# Patient Record
Sex: MALE | Race: WHITE | Employment: OTHER | URBAN - METROPOLITAN AREA
[De-identification: names, ages, dates, MRNs, and addresses within clinical notes are randomized per-mention and may not be internally consistent; named-entity substitution may affect disease eponyms.]

---

## 2017-01-18 ENCOUNTER — OFFICE VISIT (OUTPATIENT)
Dept: FAMILY MEDICINE | Facility: CLINIC | Age: 67
End: 2017-01-18
Payer: COMMERCIAL

## 2017-01-18 VITALS
TEMPERATURE: 97.9 F | DIASTOLIC BLOOD PRESSURE: 80 MMHG | OXYGEN SATURATION: 97 % | HEIGHT: 69 IN | RESPIRATION RATE: 12 BRPM | SYSTOLIC BLOOD PRESSURE: 120 MMHG | WEIGHT: 187 LBS | HEART RATE: 80 BPM | BODY MASS INDEX: 27.7 KG/M2

## 2017-01-18 DIAGNOSIS — Z01.818 PREOP GENERAL PHYSICAL EXAM: Primary | ICD-10-CM

## 2017-01-18 DIAGNOSIS — G47.00 INSOMNIA, UNSPECIFIED TYPE: ICD-10-CM

## 2017-01-18 DIAGNOSIS — L98.9 SKIN LESION: ICD-10-CM

## 2017-01-18 LAB — HGB BLD-MCNC: 14.6 G/DL (ref 13.3–17.7)

## 2017-01-18 PROCEDURE — 93000 ELECTROCARDIOGRAM COMPLETE: CPT | Performed by: PHYSICIAN ASSISTANT

## 2017-01-18 PROCEDURE — 99214 OFFICE O/P EST MOD 30 MIN: CPT | Performed by: PHYSICIAN ASSISTANT

## 2017-01-18 PROCEDURE — 36415 COLL VENOUS BLD VENIPUNCTURE: CPT | Performed by: PHYSICIAN ASSISTANT

## 2017-01-18 PROCEDURE — 85018 HEMOGLOBIN: CPT | Performed by: PHYSICIAN ASSISTANT

## 2017-01-18 RX ORDER — AMITRIPTYLINE HYDROCHLORIDE 10 MG/1
10 TABLET ORAL
Qty: 90 TABLET | Refills: 0 | Status: SHIPPED | OUTPATIENT
Start: 2017-01-18 | End: 2017-04-13

## 2017-01-18 NOTE — MR AVS SNAPSHOT
After Visit Summary   1/18/2017    Kumar Gonzalez    MRN: [id number]           Patient Information     Date Of Birth          1950        Visit Information        Provider Department      1/18/2017 4:40 PM Sabine Cherry PA-C [facility]        Today's Diagnoses     Preop general physical exam    -  1     Screen for colon cancer         Need for prophylactic vaccination and inoculation against influenza         Need for prophylactic vaccination against Streptococcus pneumoniae (pneumococcus)         Insomnia, unspecified type           Care Instructions    Do not start or take aspirin, ibuprofen, aleve or other NSAID prior to surgery  Schedule skin lesion removal with Dr. Torres, Dr. Alfaro or Dr. Maria   Schedule colonoscopy at [facility] ([phone]).        Before Your Surgery      Call your surgeon if there is any change in your health. This includes signs of a cold or flu (such as a sore throat, runny nose, cough, rash or fever).    Do not smoke, drink alcohol or take over the counter medicine (unless your surgeon or primary care doctor tells you to) for the 24 hours before and after surgery.    If you take prescribed drugs: Follow your doctor s orders about which medicines to take and which to stop until after surgery.    Eating and drinking prior to surgery: follow the instructions from your surgeon    Take a shower or bath the night before surgery. Use the soap your surgeon gave you to gently clean your skin. If you do not have soap from your surgeon, use your regular soap. Do not shave or scrub the surgery site.  Wear clean pajamas and have clean sheets on your bed.         Follow-ups after your visit        Who to contact     If you have questions or need follow up information about today's clinic visit or your schedule please contact [facility] directly at [phone].  Normal or non-critical lab and  "imaging results will be communicated to you by MyChart, letter or phone within 4 business days after the clinic has received the results. If you do not hear from us within 7 days, please contact the clinic through Exabret or phone. If you have a critical or abnormal lab result, we will notify you by phone as soon as possible.  Submit refill requests through Bocada or call your pharmacy and they will forward the refill request to us. Please allow 3 business days for your refill to be completed.          Additional Information About Your Visit        TouchOfModernhar[employer]tration Technology Information     Bocada lets you send messages to your doctor, view your test results, renew your prescriptions, schedule appointments and more. To sign up, go to [email or web address] . Click on \"Log in\" on the left side of the screen, which will take you to the Welcome page. Then click on \"Sign up Now\" on the right side of the page.     You will be asked to enter the access code listed below, as well as some personal information. Please follow the directions to create your username and password.     Your access code is: [email or web address]  Expires: 2017  5:11 PM     Your access code will  in 90 days. If you need help or a new code, please call your [place] clinic or [phone].        Care EveryWhere ID     This is your Care EveryWhere ID. This could be used by other organizations to access your [place] medical records  [id number]        Your Vitals Were     Pulse Temperature Respirations Height BMI (Body Mass Index) Pulse Oximetry    80 97.9  F (36.6  C) (Oral) 12 1.74 m (5' 8.5\") 28.02 kg/m2 97%       Blood Pressure from Last 3 Encounters:   17 120/80   12/15/16 118/70   10/07/16 108/70    Weight from Last 3 Encounters:   17 84.823 kg (187 lb)   12/15/16 78.472 kg (173 lb)   10/07/16 81.285 kg (179 lb 3.2 oz)              We Performed the Following     EKG 12-lead complete w/read - Clinics     Hemoglobin          Today's Medication " Changes          These changes are accurate as of: 1/18/17  5:11 PM.  If you have any questions, ask your nurse or doctor.               These medicines have changed or have updated prescriptions.        Dose/Directions    simvastatin 20 MG tablet   Commonly known as:  ZOCOR   This may have changed:    - when to take this  - additional instructions   Used for:  Hyperlipidemia LDL goal <130        Dose:  20 mg   Take 1 tablet (20 mg) by mouth At Bedtime Patient needs to be seen prior to further refills.   Quantity:  90 tablet   Refills:  3            Where to get your medicines      These medications were sent to [id number] - [place] - [address]  [address], [facility] [address]     Phone:  [phone]    - amitriptyline 10 MG tablet             Primary Care Provider Office Phone # Fax #    Suzanne Torres [phone]       [facility] [address] [place] [address] N  [facility] [address]        Thank you!     Thank you for choosing [facility]  for your care. Our goal is always to provide you with excellent care. Hearing back from our patients is one way we can continue to improve our services. Please take a few minutes to complete the written survey that you may receive in the mail after your visit with us. Thank you!             Your Updated Medication List - Protect others around you: Learn how to safely use, store and throw away your medicines at www.disposemymeds.org.          This list is accurate as of: 1/18/17  5:11 PM.  Always use your most recent med list.                   Brand Name Dispense Instructions for use    amitriptyline 10 MG tablet    ELAVIL    90 tablet    Take 1 tablet (10 mg) by mouth nightly as needed Taking wifes medication       CALCIUM + D PO      Take by mouth daily 1200 MG/1000 MG D3       metroNIDAZOLE 0.75 % cream    METROCREAM    45 g    Apply topically daily       order for DME     1 Device     Equipment being ordered: [id number], $242, , WALKING, BOOT, LG, SHORT PNEUM       prednisoLONE acetate 1 % ophthalmic susp    PRED FORTE         simvastatin 20 MG tablet    ZOCOR    90 tablet    Take 1 tablet (20 mg) by mouth At Bedtime Patient needs to be seen prior to further refills.       traZODone 50 MG tablet    DESYREL    30 tablet    Take 1-2 tablets ( mg) by mouth nightly as needed for sleep       VALTREX PO      Take 1,000 mg by mouth daily

## 2017-01-18 NOTE — PROGRESS NOTES
[address] [address]  [phone]  Dept: [phone]    PRE-OP EVALUATION:  Today's date: 2017    Kumar Gonzalez (: 1950) presents for pre-operative evaluation assessment as requested by Dr. Pool.  He requires evaluation and anesthesia risk assessment prior to undergoing surgery/procedure for treatment of Eye Surgery .  Proposed procedure: Eye Surgery    Date of Surgery/ Procedure: 17  Time of Surgery/ Procedure: 11:30 am  Hospital/Surgical Facility: [facility] in [place]  Fax number for surgical facility: [phone]  Primary Physician: Suzanne Torres  Type of Anesthesia Anticipated: to be determined    Patient has a Health Care Directive or Living Will:  NO    1. NO - Do you have a history of heart attack, stroke, stent, bypass or surgery on an artery in the head, neck, heart or legs?  2. NO - Do you ever have any pain or discomfort in your chest?  3. NO - Do you have a history of  Heart Failure?  4. NO - Are you troubled by shortness of breath when: walking on the level, up a slight hill or at night?  5. NO - Do you currently have a cold, bronchitis or other respiratory infection?  6. NO - Do you have a cough, shortness of breath or wheezing?  7. NO - Do you sometimes get pains in the calves of your legs when you walk?  8. NO - Do you or anyone in your family have previous history of blood clots?  9. NO - Do you or does anyone in your family have a serious bleeding problem such as prolonged bleeding following surgeries or cuts?  10. NO - Have you ever had problems with anemia or been told to take iron pills?  11. NO - Have you had any abnormal blood loss such as black, tarry or bloody stools, or abnormal vaginal bleeding?  12. NO - Have you ever had a blood transfusion?  13. NO - Have you or any of your relatives ever had problems with anesthesia?  14. NO - Do you have sleep apnea, excessive snoring or daytime  drowsiness?  15. NO - Do you have any prosthetic heart valves?  16. NO - Do you have prosthetic joints?  17. NO - Is there any chance that you may be pregnant?      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        HYPERLIPIDEMIA - Patient has a long history of significant Hyperlipidemia requiring medication for treatment with recent good control. Patient reports no problems or side effects with the medication.                                                                                                                                                       .    MEDICAL HISTORY:                                                      Patient Active Problem List    Diagnosis Date Noted     Insomnia 05/07/2014     Priority: Medium     Solar lentigo 08/27/2013     Priority: Medium     Anesthesia complication 02/20/2013     Priority: Medium     Seizure after eyelid surgery in 2008.   Neuro w/u suggestive of medications used. See letter from Dr. Calderon 2009  Anesthesia since incident with no complications       AK (actinic keratosis) 09/24/2012     Priority: Medium     KP (keratosis pilaris) 07/20/2012     Priority: Medium     Vitamin D deficiency 01/05/2012     Priority: Medium     Mild, noted 2011 and 2012       Advanced directives, counseling/discussion 01/02/2012     Priority: Medium     Advance Directive Problem List Overview:   Name Relationship Phone    Primary Health Care Agent            Alternative Health Care Agent          Discussed advance care planning with patient; however, patient declined at this time. 1/2/2012          History of actinic keratosis 12/07/2011     Priority: Medium     Xerosis cutis 12/07/2011     Priority: Medium     Lentigo 02/02/2011     Priority: Medium     Family history of melanoma 02/02/2011      Priority: Medium     Osteoporosis 01/19/2011     Priority: Medium     Spine, dexa 12/2010         Rosacea 12/02/2010     Priority: Medium     Colon polyp 12/01/2010     Priority: Medium     BPH (benign prostatic hyperplasia) 12/01/2010     Priority: Medium     Loss of height 12/01/2010     Priority: Medium     Erectile dysfunction 12/01/2010     Priority: Medium     Impacted cerumen 12/01/2010     Priority: Medium     HYPERLIPIDEMIA LDL GOAL <130 10/31/2010     Priority: Medium     Alopecia 03/24/2008     Priority: Medium     Problem list name updated by automated process. Provider to review       Family history of stroke (cerebrovascular) 03/24/2008     Priority: Medium     Carotids stenosis: younger brother, all of uncles (3 ), dad  Check carotid u/s annual        Past Medical History   Diagnosis Date     ALOPECIA NOS 3/24/2008     Seizure (H) 10/28/2008     after general anesthesia     Actinic keratosis      Hyperlipidemia LDL goal <130 10/31/2010     BPH (benign prostatic hyperplasia) 12/1/2010     Erectile dysfunction 12/1/2010     Vitamin D deficiency 1/5/2012     Mild, noted 2011 and 2012     Past Surgical History   Procedure Laterality Date     C appendectomy  8th Grade     Hc pneumatic retinopexy  Multiple     Bilateral     Current Outpatient Prescriptions   Medication Sig Dispense Refill     order for DME Equipment being ordered: [id number], $242, , WALKING, BOOT, LG, SHORT PNEUM 1 Device 0     traZODone (DESYREL) 50 MG tablet Take 1-2 tablets ( mg) by mouth nightly as needed for sleep 30 tablet 1     simvastatin (ZOCOR) 20 MG tablet Take 1 tablet (20 mg) by mouth At Bedtime Patient needs to be seen prior to further refills. (Patient taking differently: Take 20 mg by mouth daily Patient needs to be seen prior to further refills.) 90 tablet 3     metroNIDAZOLE (METROCREAM) 0.75 % cream Apply topically daily 45 g 11     amitriptyline (ELAVIL) 10 MG tablet Take 1 tablet (10 mg) by mouth nightly as  "needed Taking wifes medication 90 tablet 1     prednisoLONE acetate (PRED FORTE) 1 % ophthalmic suspension        ValACYclovir HCl (VALTREX PO) Take 1,000 mg by mouth daily        Calcium Carbonate-Vitamin D (CALCIUM + D PO) Take by mouth daily 1200 MG/1000 MG D3       OTC products: None, except as noted above    No Known Allergies   Latex Allergy: NO    Social History   Substance Use Topics     Smoking status: Never Smoker      Smokeless tobacco: Never Used     Alcohol Use: Yes      Comment: occasionaly     History   Drug Use No       REVIEW OF SYSTEMS:                                                    Constitutional, neuro, ENT, endocrine, pulmonary, cardiac, gastrointestinal, genitourinary, musculoskeletal, integument and psychiatric systems are negative, except as otherwise noted. Has lesion left thigh nonhealing lesion approximately one year  EXAM:                                                    /80 mmHg  Pulse 80  Temp(Src) 97.9  F (36.6  C) (Oral)  Resp 12  Ht 1.74 m (5' 8.5\")  Wt 84.823 kg (187 lb)  BMI 28.02 kg/m2  SpO2 97%    GENERAL APPEARANCE: healthy, alert and no distress     EYES: EOMI, - PERRL     HENT: ear canals and TM's normal and nose and mouth without ulcers or lesions     NECK: no adenopathy, no asymmetry, masses, or scars and thyroid normal to palpation     RESP: lungs clear to auscultation - no rales, rhonchi or wheezes     CV: regular rates and rhythm, normal S1 S2, no S3 or S4 and no murmur, click or rub -     ABDOMEN:  soft, nontender, no HSM or masses and bowel sounds normal     MS: extremities normal- no gross deformities noted, no evidence of inflammation in joints, FROM in all extremities.     SKIN: left thigh with irregular erythematous scaling lesion with central eschar     NEURO: Normal strength and tone, sensory exam grossly normal, mentation intact and speech normal     PSYCH: mentation appears normal. and affect normal/bright     LYMPHATICS: No axillary, " cervical, inguinal, or supraclavicular nodes    DIAGNOSTICS:                                                      EKG: appears normal, NSR, normal axis, normal intervals, no acute ST/T changes c/w ischemia, no LVH by voltage criteria, unchanged from previous tracings  Labs Resulted Today:   Results for orders placed or performed in visit on 01/18/17   Hemoglobin   Result Value Ref Range    Hemoglobin 14.6 13.3 - 17.7 g/dL       Recent Labs   Lab Test  08/25/16   0734  07/13/16   0800  06/04/15   0726  06/19/14   0820   HGB   --    --   15.2  14.5   PLT   --    --   157  115*   NA  141  142   --   140   POTASSIUM  4.8  4.5   --   3.9   CR  1.20  1.25   --   1.09        IMPRESSION:                                                    Reason for surgery/procedure: vision changes   Diagnosis/reason for consult: anesthesia clearance     The proposed surgical procedure is considered INTERMEDIATE risk.    REVISED CARDIAC RISK INDEX  The patient has the following serious cardiovascular risks for perioperative complications such as (MI, PE, VFib and 3  AV Block):  No serious cardiac risks  INTERPRETATION: 0 risks: Class I (very low risk - 0.4% complication rate)    The patient has the following additional risks for perioperative complications:  No identified additional risks      ICD-10-CM    1. Preop general physical exam Z01.818 EKG 12-lead complete w/read - Clinics     Hemoglobin   2. Insomnia, unspecified type G47.00 amitriptyline (ELAVIL) 10 MG tablet   3. Skin lesion L98.9     skin lesion left thigh recommend scheduling clinic removal for excision of lesion    RECOMMENDATIONS:                                                          --Patient is to take all scheduled medications on the day of surgery EXCEPT for modifications listed below.    APPROVAL GIVEN to proceed with proposed procedure, without further diagnostic evaluation       Signed Electronically by: Sabine Cherry PA-C    Copy of this evaluation report  is provided to requesting physician.    CC chart to PCP for fyi     [facility] Preop Guidelines

## 2017-01-18 NOTE — PATIENT INSTRUCTIONS
Do not start or take aspirin, ibuprofen, aleve or other NSAID prior to surgery  Schedule skin lesion removal with Dr. Torres, Dr. Alfaro or Dr. Maria   Schedule colonoscopy at [facility] ([phone]).        Before Your Surgery      Call your surgeon if there is any change in your health. This includes signs of a cold or flu (such as a sore throat, runny nose, cough, rash or fever).    Do not smoke, drink alcohol or take over the counter medicine (unless your surgeon or primary care doctor tells you to) for the 24 hours before and after surgery.    If you take prescribed drugs: Follow your doctor s orders about which medicines to take and which to stop until after surgery.    Eating and drinking prior to surgery: follow the instructions from your surgeon    Take a shower or bath the night before surgery. Use the soap your surgeon gave you to gently clean your skin. If you do not have soap from your surgeon, use your regular soap. Do not shave or scrub the surgery site.  Wear clean pajamas and have clean sheets on your bed.

## 2017-01-18 NOTE — NURSING NOTE
"Chief Complaint   Patient presents with     Pre-Op Exam       Initial /80 mmHg  Pulse 80  Temp(Src) 97.9  F (36.6  C) (Oral)  Resp 12  Ht 1.74 m (5' 8.5\")  Wt 84.823 kg (187 lb)  BMI 28.02 kg/m2  SpO2 97% Estimated body mass index is 28.02 kg/(m^2) as calculated from the following:    Height as of this encounter: 1.74 m (5' 8.5\").    Weight as of this encounter: 84.823 kg (187 lb).  BP completed using cuff size: chad [place]        "

## 2017-01-23 ENCOUNTER — TELEPHONE (OUTPATIENT)
Dept: FAMILY MEDICINE | Facility: CLINIC | Age: 67
End: 2017-01-23

## 2017-01-23 NOTE — TELEPHONE ENCOUNTER
----- Message from Sabine Cherry PA-C sent at 1/22/2017  5:55 PM CST -----  Please fax preop   Thanks  Sabine Cherry, PAC

## 2017-04-13 ENCOUNTER — OFFICE VISIT (OUTPATIENT)
Dept: ORTHOPEDICS | Facility: CLINIC | Age: 67
End: 2017-04-13
Payer: COMMERCIAL

## 2017-04-13 VITALS — OXYGEN SATURATION: 98 % | DIASTOLIC BLOOD PRESSURE: 60 MMHG | SYSTOLIC BLOOD PRESSURE: 108 MMHG | HEART RATE: 66 BPM

## 2017-04-13 DIAGNOSIS — M67.88 ACHILLES TENDINOSIS: Primary | ICD-10-CM

## 2017-04-13 PROCEDURE — 99213 OFFICE O/P EST LOW 20 MIN: CPT | Performed by: FAMILY MEDICINE

## 2017-04-13 RX ORDER — TOBRAMYCIN 3 MG/ML
SOLUTION/ DROPS OPHTHALMIC
COMMUNITY
Start: 2017-04-11 | End: 2017-04-13

## 2017-04-13 ASSESSMENT — PAIN SCALES - GENERAL: PAINLEVEL: MILD PAIN (3)

## 2017-04-13 NOTE — MR AVS SNAPSHOT
After Visit Summary   2017    Kumar Gonzalez    MRN: [id number]           Patient Information     Date Of Birth          1950        Visit Information        Provider Department      2017 7:00 AM Dagoberto Plaza, DO [facility]        Today's Diagnoses     Achilles tendinosis    -  1      Care Instructions    Thanks for coming today.  Ortho/Sports Medicine Clinic  [address] [place] [address]    To schedule future appointments in Ortho Clinic, you may call [phone].    To schedule ordered imaging by your provider:   Call Central [facility] [phone]    To schedule an injection ordered by your provider:  Call [facility] Injection scheduling line: [phone]  JumpSellert available online at:  [email or web address]    Please call if any further questions or concerns ([phone]).  Clinic hours 8 am to 5 pm.    Return to clinic (call) if symptoms worsen or fail to improve.          Follow-ups after your visit        Additional Services     PHYSICAL THERAPY REFERRAL       Please eval and treat for achilles tendinopathy.  Use eccentrics / Alfredson protocol as dictated by PT.    Please use graston, astym, modalities prn.                  Who to contact     If you have questions or need follow up information about today's clinic visit or your schedule please contact [facility] directly at [phone].  Normal or non-critical lab and imaging results will be communicated to you by MyChart, letter or phone within 4 business days after the clinic has received the results. If you do not hear from us within 7 days, please contact the clinic through MyChart or phone. If you have a critical or abnormal lab result, we will notify you by phone as soon as possible.  Submit refill requests through Wikirin or call your pharmacy and they will forward the refill request to us. Please allow 3 business days for your refill to be completed.           Additional Information About Your Visit        Fish Nature Information     Fish Nature is an electronic gateway that provides easy, online access to your medical records. With Fish Nature, you can request a clinic appointment, read your test results, renew a prescription or communicate with your care team.     To sign up for Fish Nature visit the website at [email or web address]   You will be asked to enter the access code listed below, as well as some personal information. Please follow the directions to create your username and password.     Your access code is: [email or web address]  Expires: 2017  6:11 PM     Your access code will  in 90 days. If you need help or a new code, please contact your [facility] or call [phone] for assistance.        Care EveryWhere ID     This is your Care EveryWhere ID. This could be used by other organizations to access your [place] medical records  [id number]        Your Vitals Were     Pulse Pulse Oximetry                66 98%           Blood Pressure from Last 3 Encounters:   17 108/60   17 120/80   12/15/16 118/70    Weight from Last 3 Encounters:   17 84.8 kg (187 lb)   12/15/16 78.5 kg (173 lb)   10/07/16 81.3 kg (179 lb 3.2 oz)              We Performed the Following     PHYSICAL THERAPY REFERRAL          Today's Medication Changes          These changes are accurate as of: 17  7:37 AM.  If you have any questions, ask your nurse or doctor.               These medicines have changed or have updated prescriptions.        Dose/Directions    simvastatin 20 MG tablet   Commonly known as:  ZOCOR   This may have changed:    - when to take this  - additional instructions   Used for:  Hyperlipidemia LDL goal <130        Dose:  20 mg   Take 1 tablet (20 mg) by mouth At Bedtime Patient needs to be seen prior to further refills.   Quantity:  90 tablet   Refills:  3                Primary Care Provider Office Phone # Fax #     Suzanne Torres [phone]       [facility] [address]  [facility] [address]        Thank you!     Thank you for choosing [facility]  for your care. Our goal is always to provide you with excellent care. Hearing back from our patients is one way we can continue to improve our services. Please take a few minutes to complete the written survey that you may receive in the mail after your visit with us. Thank you!             Your Updated Medication List - Protect others around you: Learn how to safely use, store and throw away your medicines at www.disposemymeds.org.          This list is accurate as of: 4/13/17  7:37 AM.  Always use your most recent med list.                   Brand Name Dispense Instructions for use    CALCIUM + D PO      Take by mouth daily 1200 MG/1000 MG D3       metroNIDAZOLE 0.75 % cream    METROCREAM    45 g    Apply topically daily       order for DME     1 Device    Equipment being ordered: [id number], $242, , WALKING, BOOT, LG, SHORT PNEUM       prednisoLONE acetate 1 % ophthalmic susp    PRED FORTE         simvastatin 20 MG tablet    ZOCOR    90 tablet    Take 1 tablet (20 mg) by mouth At Bedtime Patient needs to be seen prior to further refills.       VALTREX PO      Take 1,000 mg by mouth daily

## 2017-04-13 NOTE — NURSING NOTE
"Kumar Gonzalez's goals for this visit include: Follow up with right ankle pain.   He requests these members of his care team be copied on today's visit information: yes    PCP: Suzanne Torres    Referring Provider:  No referring provider defined for this encounter.    Chief Complaint   Patient presents with     RECHECK     Ankle/Foot right     Pain still comes and goes. When walking down steps he gets a sharp pain. When walking it is a dull pain.        Initial /60 (BP Location: Right arm, Patient Position: Chair, Cuff Size: Adult Regular)  Pulse 66  SpO2 98% Estimated body mass index is 28.02 kg/(m^2) as calculated from the following:    Height as of 1/18/17: 1.74 m (5' 8.5\").    Weight as of 1/18/17: 84.8 kg (187 lb).  Medication Reconciliation: complete    "

## 2017-04-13 NOTE — PROGRESS NOTES
HISTORY OF PRESENT ILLNESS  Mr. Gonzalez is a pleasant 66 year old year old male who presents to clinic today for follow up of his right ankle pain.  I last saw Kumar on December 15 for swelling and pain likely related to anterior ankle impingement.  Kumar explains that he wore his boot for 2 weeks which completely got rid of his anterior pain.  However, he developed posterior pain after getting out of the boot.  Points to the back of his ankle, no pain at rest, only walking.  Worse going down steps, no pain up steps.  Denies swelling, numbness, tingling.  Quality: sharp pain    Severity: moderate  Timing: occurs intermittently  Modifying factors:  resting and non-use makes it better, movement and use makes it worse  Associated signs & symptoms: none  Previous similar pain: no  Additional history: as documented      REVIEW OF SYSTEMS (4/13/2017)  10 point ROS of systems including Constitutional, Eyes, Respiratory, Cardiovascular, Gastroenterology, Genitourinary, Integumentary, Musculoskeletal, Psychiatric were all negative except for pertinent positives noted in my HPI.     PHYSICAL EXAM  Vitals:    04/13/17 0710   BP: 108/60   BP Location: Right arm   Patient Position: Chair   Cuff Size: Adult Regular   Pulse: 66   SpO2: 98%     General  - normal appearance, in no obvious distress  CV  - normal pulses at posterior tib and dorsalis pedis  Pulm  - normal respiratory pattern, non-labored  Musculoskeletal - right foot / ankle  - stance: normal gait without limp, normal stance without excessive pronation, normal heel inversion with standing heel raise, no obvious leg length discrepancy, normal heel and toe walk  - inspection: normal appearing posterior heel, normal bone and joint alignment, no obvious deformity  - palpation: tender over the mid-substance portion of the Achilles about 1cm superior to insertion  - ROM: limited passive dorsiflexion, normal plantar flexion, inversion, and eversion  - strength: 5/5 in all  planes  Neuro  - no sensory or motor deficit, grossly normal coordination, normal muscle tone  Skin  - no ecchymosis, erythema, warmth, or induration, no obvious rash  Psych  - interactive, appropriate, normal mood and affect          ASSESSMENT & PLAN  Mr. Gonzalez is a 66 year old year old male who is in the office today following up with right ankle pain.    Kumar likely has Achilles tendinopathy that may or may not have developed as a result of immobilization in the boot.    I'm supplying him with heel cups to wear as a lift before he travels to [facility] tomorrow.  He should also take NSAIDS and ice as needed.  If too painful he can return to the boot for a short period of time.    I'm also referring him to PT.    I recommend that Kumar return to my office for a re-examination in 4-6 weeks.  He should follow up sooner if the condition worsens or if other problems arise.    It was a pleasure taking care of Kumar.        Dagoberto Plaza DO, CAM

## 2017-04-13 NOTE — PATIENT INSTRUCTIONS
Thanks for coming today.  Ortho/Sports Medicine Clinic  [address] [place] [address]    To schedule future appointments in Ortho Clinic, you may call [phone].    To schedule ordered imaging by your provider:   Call Central Imaging [phone]    To schedule an injection ordered by your provider:  Call Central Imaging Injection scheduling line: [phone]  [employer] available online at:  [email or web address]    Please call if any further questions or concerns ([phone]).  Clinic hours 8 am to 5 pm.    Return to clinic (call) if symptoms worsen or fail to improve.

## 2017-04-28 ENCOUNTER — THERAPY VISIT (OUTPATIENT)
Dept: PHYSICAL THERAPY | Facility: CLINIC | Age: 67
End: 2017-04-28
Payer: COMMERCIAL

## 2017-04-28 DIAGNOSIS — M25.571 CHRONIC PAIN OF RIGHT ANKLE: Primary | ICD-10-CM

## 2017-04-28 DIAGNOSIS — G89.29 CHRONIC PAIN OF RIGHT ANKLE: Primary | ICD-10-CM

## 2017-04-28 PROCEDURE — 97161 PT EVAL LOW COMPLEX 20 MIN: CPT | Mod: GP | Performed by: PHYSICAL THERAPIST

## 2017-04-28 PROCEDURE — 97110 THERAPEUTIC EXERCISES: CPT | Mod: GP | Performed by: PHYSICAL THERAPIST

## 2017-04-28 NOTE — PROGRESS NOTES
[facility] Initial Evaluation      Subjective:    Patient is a 66 year old male presenting with rehab right ankle/foot hpi.   Kumar Gonzalez is a 66 year old male with a right ankle condition.  Occurance: walking down steps was a problem, just prolonged walking was becoming bothersome.   Condition occurred: for unknown reasons.  This is a chronic condition  Began about 8 weeks ago. 4/12/17 date of MD order. .    Site of Pain: achilles tendon.  Radiates to:  No radiation.   and is intermittent and reported as 1/10.   Pain is the same all the time.  Symptoms are exacerbated by walking and descending stairs (swimming doing the breast stroke kick) Relieved by: advil and rest.  Since onset symptoms are gradually improving.        General health as reported by patient is good.  Pertinent medical history includes:  None.  Medical allergies: no.  Other surgeries include:  None reported.  Current medications:  None as reported by patient.  Current occupation is .    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    Barriers include:  None as reported by patient.    Red flags:  None as reported by patient.                        Objective:      Gait:  Decreased toe off on right  Gait Type:  Antalgic   Assistive Devices:  None            Ankle/Foot Evaluation  ROM:  Prom wnl ankle: DF normal no pain, PF normal no pain, ERP DF with knee bent , inversion normal no pain, Eversion normal no pain,   AROM:    Dorsiflexion:  Left:   10  Right:   10  Plantarflexion:  Left:  55    Right:  55  Inversion:  Left:  42     Right:  40  Eversion:  12     Right:  8        Strength:    Dorsiflexion:  Left: 5/5   Strong/pain free    Pain:   Right: 4/5   Weak/painful  Pain:  Plantarflexion: Left: 5/5   Strong/pain free  Pain:   Right: 4/5  Weak/painful  Pain:  Inversion:Left: 5/5  Strong/pain free  Pain:     Right: 5/5  Strong/pain free  Pain:  Eversion:Left: 5/5  Strong/pain free  Pain:   Right: 5/5  Strong/pain free  Pain:                  LIGAMENT TESTING: normal              SPECIAL TESTS: Special tests ankle: Repeated DF increase NW, repeated PF NE/ NE.    PALPATION:     Right ankle tenderness present at:   achilles tendon and plantar fascia  Right ankle tenderness not present at:  anterior tibialis; deltoid ligament or medial calcaneal    MOBILITY TESTING: normal                                                                General     ROS    Assessment/Plan:      Patient is a 66 year old male with right side ankle complaints.    Patient has the following significant findings with corresponding treatment plan.                Diagnosis 1:  Right ankle pain / Achilles contractile dysfunction  Pain -  US, manual therapy, self management, education, directional preference exercise and home program  Decreased strength - therapeutic exercise, therapeutic activities and home program  Impaired gait - gait training and home program  Decreased function - therapeutic activities and home program    Therapy Evaluation Codes:   1) History comprised of:   Personal factors that impact the plan of care:      None.    Comorbidity factors that impact the plan of care are:      None.     Medications impacting care: None.  2) Examination of Body Systems comprised of:   Body structures and functions that impact the plan of care:      Ankle.   Activity limitations that impact the plan of care are:      Stairs and Walking.  3) Clinical presentation characteristics are:   Stable/Uncomplicated.  4) Decision-Making    Low complexity using standardized patient assessment instrument and/or measureable assessment of functional outcome.  Cumulative Therapy Evaluation is: Low complexity.    Previous and current functional limitations:  (See Goal Flow Sheet for this information)    Short term and Long term goals: (See Goal Flow Sheet for this information)     Communication ability:  Patient appears to be able to clearly  communicate and understand verbal and written communication and follow directions correctly.  Treatment Explanation - The following has been discussed with the patient:   RX ordered/plan of care  Anticipated outcomes  Possible risks and side effects  This patient would benefit from PT intervention to resume normal activities.   Rehab potential is good.    Frequency:  1 X week, once daily  Duration:  for 4 weeks tapering to 1 X a Month over 2 Months  Discharge Plan:  Achieve all LTG.  Independent in home treatment program.  Reach maximal therapeutic benefit.    Please refer to the daily flowsheet for treatment today, total treatment time and time spent performing 1:1 timed codes.

## 2017-05-05 ENCOUNTER — TELEPHONE (OUTPATIENT)
Dept: FAMILY MEDICINE | Facility: CLINIC | Age: 67
End: 2017-05-05

## 2017-05-05 ENCOUNTER — THERAPY VISIT (OUTPATIENT)
Dept: PHYSICAL THERAPY | Facility: CLINIC | Age: 67
End: 2017-05-05
Payer: COMMERCIAL

## 2017-05-05 DIAGNOSIS — M25.571 CHRONIC PAIN OF RIGHT ANKLE: ICD-10-CM

## 2017-05-05 DIAGNOSIS — G89.29 CHRONIC PAIN OF RIGHT ANKLE: ICD-10-CM

## 2017-05-05 PROCEDURE — 97110 THERAPEUTIC EXERCISES: CPT | Mod: GP | Performed by: PHYSICAL THERAPIST

## 2017-05-11 ENCOUNTER — TRANSFERRED RECORDS (OUTPATIENT)
Dept: HEALTH INFORMATION MANAGEMENT | Facility: CLINIC | Age: 67
End: 2017-05-11

## 2017-11-21 ENCOUNTER — TELEPHONE (OUTPATIENT)
Dept: FAMILY MEDICINE | Facility: CLINIC | Age: 67
End: 2017-11-21

## 2017-11-21 DIAGNOSIS — E78.5 HYPERLIPIDEMIA LDL GOAL <130: ICD-10-CM

## 2017-11-21 NOTE — LETTER
[address]  [address]  [phone]    November 27, 2017      Kumar Carlos  [address] [facility] [address]      Dear Kumar,    We recently received a call from your pharmacy requesting a refill of your medication, Simvastatin.    A review of your chart indicates that an appointment is required with your provider.  Please call the clinic at [phone] to schedule your appointment.    We have authorized one refill of your medication to allow time for you to schedule.   If you have a history of diabetes or high cholesterol, please come in fasting for the appointment. Fasting entails nothing to eat or drink 8 hours prior to your appointment; with the exception on water. You may take your medication the day of the appointment.    Thank you,      Suzanne Torres M.D.

## 2017-11-22 RX ORDER — SIMVASTATIN 20 MG
20 TABLET ORAL AT BEDTIME
Qty: 30 TABLET | Refills: 0 | Status: SHIPPED | OUTPATIENT
Start: 2017-11-22 | End: 2017-12-21

## 2017-11-22 NOTE — TELEPHONE ENCOUNTER
This writer attempted to contact pt  on 11/22/17      Reason for call schedule OV with fasting labs and left message to return call.      If patient calls back:   Schedule Office Visit appointment within 1 month with PCP, document that pt called and close encounter .        Valerie Romero MA;e

## 2017-11-22 NOTE — TELEPHONE ENCOUNTER
Routing refill request to provider for review/approval because:  Labs not current:  Last lipids >1 year ago.     No appointment pending at this time.  Routing to provider to advise.    Beena Holman RN

## 2017-11-22 NOTE — TELEPHONE ENCOUNTER
Given 30 day prescription.  Overdue for labs and physical.  No additional refills without follow up

## 2017-11-24 NOTE — TELEPHONE ENCOUNTER
This writer attempted to contact pt on 11/24/17      Reason for call schedule appt with fasting labs and left message to return call.      If patient calls back:   Schedule Office Visit appointment within 1 month with PCP, document that pt called and close encounter .        Valerie Romero MA

## 2017-11-27 NOTE — TELEPHONE ENCOUNTER
LM for pt to call clinic.  3rd attempt, with no response. Letter sent.      Valerie KLINE, Patient Care

## 2017-12-06 PROBLEM — M25.571 CHRONIC PAIN OF RIGHT ANKLE: Status: RESOLVED | Noted: 2017-04-28 | Resolved: 2017-12-06

## 2017-12-06 PROBLEM — G89.29 CHRONIC PAIN OF RIGHT ANKLE: Status: RESOLVED | Noted: 2017-04-28 | Resolved: 2017-12-06

## 2017-12-06 NOTE — PROGRESS NOTES
Subjective:    HPI                    Objective:    System    Physical Exam    General     ROS    Assessment/Plan:      DISCHARGE REPORT    Progress reporting period is from 4-28-17 to 5-5-17.     SUBJECTIVE  Subjective: Patient notes that he is better.  He was able to golf 8 holes before the L ankle started to bother him (4/29) and on 5/2 was able to walk the full 9 holes.  He was sore on 5/3/2017.  Symptoms were present in the morning  for a couple hours.  Was consistent with HEP 1-3 times a day.  80% improved overall.  Happily shocked with the progress.  Symptoms are felt in the R Achilles and with prolonged walking he will feel symptoms for the anterior ankle.  Patient notes that he will be having a super-physical T-Th next week through [facility].     Current Pain level: 0/10 (not having symptoms at rest.)   Initial Pain level: 4/10   Changes in function: Yes, see goal flow sheet for change in function   Adverse reactions: None;   ,     The subjective and objective information are from the last SOAP note on this patient.    OBJECTIVE  Objective: DF 4/5, great toe ext 3/5, calf raise less than 50% of non-involved LE.  Symmetrical AROM and PROM for the ankles  Patient had been doing calf raises 1-3 times a day, 10 reps.  Had not been doing eccentric.        ASSESSMENT/PLAN  Updated problem list and treatment plan: Diagnosis 1:  Ankle pain     STG/LTGs have been met or progress has been made towards goals:  Yes (See Goal flow sheet completed today.) and None  Assessment of Progress: Patient has not returned to therapy.  Current status is unknown and discharge G code cannot be reported.  Self Management Plans:  Patient has been instructed in a home treatment program.  Patient  has been instructed in self management of symptoms.  Kumar continues to require the following intervention to meet STG and LTG's: PT intervention is no longer required to meet STG/LTG.  The patient failed to complete scheduled/ordered  appointments so current information is unknown.  We will discharge this patient from PT.    Recommendations:  This patient will be discharged from therapy and continue their home treatment program.    Please refer to the daily flowsheet for treatment today, total treatment time and time spent performing 1:1 timed codes.

## 2017-12-21 ENCOUNTER — TELEPHONE (OUTPATIENT)
Dept: FAMILY MEDICINE | Facility: CLINIC | Age: 67
End: 2017-12-21

## 2017-12-21 DIAGNOSIS — E78.5 HYPERLIPIDEMIA LDL GOAL <130: ICD-10-CM

## 2017-12-21 NOTE — TELEPHONE ENCOUNTER
Reason for Call:  Medication or medication r    Name of the medication requested: SIMVASTATIN    Other request:     Can we leave a detailed message on this number? YES    Phone number patient can be reached at: Home number on file [phone] (home)    Best Time: ANY    Call taken on 12/21/2017 at 8:49 AM by Haven Paris

## 2017-12-21 NOTE — TELEPHONE ENCOUNTER
simvastatin (ZOCOR) 20 MG tablet     Last Written Prescription Date: 11/22/17  Last Fill Quantity: 30, # refills: 0  Last Office Visit with G, P or [facility] prescribing provider: 1/18/17       Lab Results   Component Value Date    CHOL 143 07/13/2016     Lab Results   Component Value Date    HDL 47 07/13/2016     Lab Results   Component Value Date    LDL 83 07/13/2016     Lab Results   Component Value Date    TRIG 66 07/13/2016     Lab Results   Component Value Date    CHOLHDLRMANJEETO 4.9 06/04/2015

## 2017-12-27 RX ORDER — SIMVASTATIN 20 MG
20 TABLET ORAL AT BEDTIME
Qty: 90 TABLET | Refills: 0 | Status: SHIPPED | OUTPATIENT
Start: 2017-12-27 | End: 2018-04-10

## 2017-12-27 NOTE — TELEPHONE ENCOUNTER
simvastatin (ZOCOR) 20 MG tablet  Routing refill request to provider for review/approval because:  Isabel given x1 and patient did not follow up, please advise  Labs not current:  Fasting lipid panel   Patient needs to be seen because:  Due for physical on or after 01/18/2018    Judy Lopez RN, BSN

## 2017-12-27 NOTE — TELEPHONE ENCOUNTER
Spoke with patient, patient uses [facility]. Refill sent in.    Gisela Vidal RN   [facility]

## 2018-04-10 DIAGNOSIS — E78.5 HYPERLIPIDEMIA LDL GOAL <130: ICD-10-CM

## 2018-04-10 NOTE — TELEPHONE ENCOUNTER
"Requested Prescriptions   Pending Prescriptions Disp Refills     simvastatin (ZOCOR) 20 MG tablet [Pharmacy Med Name: Simvastatin Oral Tablet 20 MG]  Last Written Prescription Date:  12/27/17  Last Fill Quantity: 90 tablet,  # refills: 0   Last office visit: 1/18/2017 with prescribing provider:  Dr. Torres   Future Office Visit:   Next 5 appointments (look out 90 days)     May 10, 2018  9:45 AM CDT   Return Visit with Misty Staples MD   [facility]    [address] [place] [address] [phone]               90 tablet 0     Sig: TAKE 1 TABLET BY MOUTH AT BEDTIME    Statins Protocol Failed    4/10/2018 10:41 AM       Failed - LDL on file in past 12 months    Recent Labs   Lab Test  07/13/16   0800   LDL  83            Failed - Recent (12 mo) or future (30 days) visit within the authorizing provider's specialty    Patient had office visit in the last 12 months or has a visit in the next 30 days with authorizing provider or within the authorizing provider's specialty.  See \"Patient Info\" tab in inbasket, or \"Choose Columns\" in Meds & Orders section of the refill encounter.           Passed - No abnormal creatine kinase in past 12 months    No lab results found.        Passed - Patient is age 18 or older        Medication Detail      Disp Refills Start End ISIDRO   simvastatin (ZOCOR) 20 MG tablet 90 tablet 0 12/27/2017  No   Sig: Take 1 tablet (20 mg) by mouth At Bedtime Patient needs to be seen and fasting labs prior to any additional refills.   Class: E-Prescribe   Route: Oral   Order: [id number]   E-Prescribing Status: Receipt confirmed by pharmacy (12/27/2017  1:06 PM CST)       "

## 2018-04-13 NOTE — TELEPHONE ENCOUNTER
Zocor  Routing refill request to provider for review/approval because:  Isabel given x1 and patient did not follow up  Labs not current:  FLP    Next 5 appointments (look out 90 days)     May 10, 2018  9:45 AM CDT   [facility]    [address] [place] [address] [phone]                Yandy Meyer, RN, BSN

## 2018-04-16 RX ORDER — SIMVASTATIN 20 MG
20 TABLET ORAL AT BEDTIME
Qty: 15 TABLET | Refills: 0 | Status: SHIPPED | OUTPATIENT
Start: 2018-04-16 | End: 2018-05-07

## 2018-04-30 ENCOUNTER — OFFICE VISIT (OUTPATIENT)
Dept: FAMILY MEDICINE | Facility: CLINIC | Age: 68
End: 2018-04-30
Payer: COMMERCIAL

## 2018-04-30 VITALS
TEMPERATURE: 97.6 F | HEIGHT: 68 IN | SYSTOLIC BLOOD PRESSURE: 130 MMHG | DIASTOLIC BLOOD PRESSURE: 80 MMHG | WEIGHT: 175 LBS | BODY MASS INDEX: 26.52 KG/M2 | OXYGEN SATURATION: 95 % | HEART RATE: 74 BPM

## 2018-04-30 DIAGNOSIS — M81.0 OSTEOPOROSIS, UNSPECIFIED OSTEOPOROSIS TYPE, UNSPECIFIED PATHOLOGICAL FRACTURE PRESENCE: ICD-10-CM

## 2018-04-30 DIAGNOSIS — Z23 NEED FOR PROPHYLACTIC VACCINATION AGAINST STREPTOCOCCUS PNEUMONIAE (PNEUMOCOCCUS): ICD-10-CM

## 2018-04-30 DIAGNOSIS — E78.5 HYPERLIPIDEMIA LDL GOAL <130: Primary | ICD-10-CM

## 2018-04-30 DIAGNOSIS — R25.2 LEG CRAMP: ICD-10-CM

## 2018-04-30 DIAGNOSIS — Z12.11 SCREEN FOR COLON CANCER: ICD-10-CM

## 2018-04-30 PROCEDURE — 99214 OFFICE O/P EST MOD 30 MIN: CPT | Performed by: FAMILY MEDICINE

## 2018-04-30 PROCEDURE — 90732 PPSV23 VACC 2 YRS+ SUBQ/IM: CPT | Performed by: FAMILY MEDICINE

## 2018-04-30 ASSESSMENT — PAIN SCALES - GENERAL: PAINLEVEL: NO PAIN (0)

## 2018-04-30 NOTE — MR AVS SNAPSHOT
After Visit Summary   4/30/2018    Kumar Gonzalez    MRN: [id number]           Patient Information     Date Of Birth          1950        Visit Information        Provider Department      4/30/2018 9:40 AM Suzanne Torres MD [facility]        Today's Diagnoses     Hyperlipidemia LDL goal <130    -  1    Screen for colon cancer        Need for prophylactic vaccination against Streptococcus pneumoniae (pneumococcus)        Osteoporosis, unspecified osteoporosis type, unspecified pathological fracture presence        Leg cramp          Care Instructions    After Visit Summary    * At your next visit/ annual physical exam:   * Return fasting for labs.   * Please return to clinic for your Shingles vaccine.       * Shin pain/cramping: make sure you have an adequate amount of water intake daily.          At [facility], we strive to deliver an exceptional experience to you, every time we see you.  If you receive a survey in the mail, please send us back your thoughts. We really do value your feedback.    Based on your medical history, these are the current health maintenance/preventive care services that you are due for (some may have been done at this visit.)  Health Maintenance Due   Topic Date Due     COLONOSCOPY Q5 YR  12/14/2015     PNEUMOCOCCAL (1 of 2 - PCV13) 08/21/2016     AORTIC ANEURYSM SCREENING (SYSTEM ASSIGNED)  08/21/2016     ADVANCE DIRECTIVE PLANNING Q5 YRS  01/02/2017     ALIYAH QUESTIONNAIRE 1 YEAR  08/01/2017     PHQ-9 Q1YR  08/01/2017     INFLUENZA VACCINE (1) 09/01/2017     FALL RISK ASSESSMENT  10/07/2017         Suggested websites for health information:  Www.Airtasker.Ilusis : Up to date and easily searchable information on multiple topics.  Www.medlineplus.gov : medication info, interactive tutorials, watch real surgeries online  Www.familydoctor.org : good info from the Academy of Family Physicians  Www.cdc.gov : public health info, travel  advisories, epidemics (H1N1)  Www.aap.org : children's health info, normal development, vaccinations  Www.health.[facility].[place].us : [place] dept of health, public health issues in [place], N1N1    Your care team:                            Family Medicine Internal Medicine   MD Kavon Mesa MD Shantel Branch-Fleming, MD Katya Georgiev PA-C Nam Ho, MD Pediatrics   GALLITO Blank, ALBERTINA Olson APRMD Ani Sorto CNP, MD Deborah Mielke, MD Kim Thein, APRN CNP      Clinic hours: Monday - Thursday 7 am-7 pm; Fridays 7 am-5 pm.   Urgent care: Monday - Friday 11 am-9 pm; Saturday and Sunday 9 am-5 pm.  Pharmacy : Monday -Thursday 8 am-8 pm; Friday 8 am-6 pm; Saturday and Sunday 9 am-5 pm.     Clinic: [phone]   Pharmacy: [phone]            Follow-ups after your visit        Your next 10 appointments already scheduled     May 10, 2018  9:45 AM CDT   Return Visit with Misty Staples MD   [facility]    [address] [place] [address] [phone]              Future tests that were ordered for you today     Open Future Orders        Priority Expected Expires Ordered    CK total Routine  4/30/2019 4/30/2018    **Comprehensive metabolic panel FUTURE anytime Routine 4/30/2018 4/30/2019 4/30/2018    **Vitamin D Deficiency FUTURE anytime Routine 4/30/2018 4/30/2019 4/30/2018    Lipid panel reflex to direct LDL Fasting Routine 4/30/2018 4/30/2019 4/30/2018    **TSH with free T4 reflex FUTURE anytime Routine 4/30/2018 4/30/2019 4/30/2018            Who to contact     If you have questions or need follow up information about today's clinic visit or your schedule please contact [facility] directly at [phone].  Normal or non-critical lab and imaging results will be communicated to you by MyChart, letter or phone within 4 business days after the clinic has received the results.  "If you do not hear from us within 7 days, please contact the clinic through [employer] or phone. If you have a critical or abnormal lab result, we will notify you by phone as soon as possible.  Submit refill requests through [employer] or call your pharmacy and they will forward the refill request to us. Please allow 3 business days for your refill to be completed.          Additional Information About Your Visit        [employer] Information     Captimo lets you send messages to your doctor, view your test results, renew your prescriptions, schedule appointments and more. To sign up, go to [email or web address] . Click on \"Log in\" on the left side of the screen, which will take you to the Welcome page. Then click on \"Sign up Now\" on the right side of the page.     You will be asked to enter the access code listed below, as well as some personal information. Please follow the directions to create your username and password.     Your access code is: [email or web address]  Expires: 2018 10:13 AM     Your access code will  in 90 days. If you need help or a new code, please call your [place] clinic or [phone].        Care EveryWhere ID     This is your Care EveryWhere ID. This could be used by other organizations to access your [place] medical records  [id number]        Your Vitals Were     Pulse Temperature Height Pulse Oximetry BMI (Body Mass Index)       74 97.6  F (36.4  C) 5' 7.5\" (1.715 m) 95% 27 kg/m2        Blood Pressure from Last 3 Encounters:   18 130/80   17 108/60   17 120/80    Weight from Last 3 Encounters:   18 175 lb (79.4 kg)   17 187 lb (84.8 kg)   12/15/16 173 lb (78.5 kg)              We Performed the Following     PNEUMOCOCCAL VACCINE,ADULT,SQ OR IM        Primary Care Provider Office Phone # Fax #    Suzanne Torres [phone] [address]  [facility] [address]        Equal Access to Services     [facility] MARIA ANTONIA AH: Javy vail " huber Alex, wachipda luqadaha, qaybta kaliz calderon, raine carmellain hayaan yamilethskylar kristiadeola laharisbren tesha. So [facility] [phone].    ATENCIÓN: Si habla español, tiene a alcantar disposición servicios gratuitos de asistencia lingüística. Jose al [phone].    We comply with applicable federal civil rights laws and [place] laws. We do not discriminate on the basis of race, color, national origin, age, disability, sex, sexual orientation, or gender identity.            Thank you!     Thank you for choosing [facility]  for your care. Our goal is always to provide you with excellent care. Hearing back from our patients is one way we can continue to improve our services. Please take a few minutes to complete the written survey that you may receive in the mail after your visit with us. Thank you!             Your Updated Medication List - Protect others around you: Learn how to safely use, store and throw away your medicines at www.disposemymeds.org.          This list is accurate as of 4/30/18 10:13 AM.  Always use your most recent med list.                   Brand Name Dispense Instructions for use Diagnosis    CALCIUM + D PO      Take by mouth daily 1200 MG/1000 MG D3        metroNIDAZOLE 0.75 % cream    METROCREAM    45 g    Apply topically daily    Rosacea       order for DME     1 Device    Equipment being ordered: [id number], $242, , WALKING, BOOT, LG, SHORT PNEUM    Acute right ankle pain       prednisoLONE acetate 1 % ophthalmic susp    PRED FORTE          simvastatin 20 MG tablet    ZOCOR    15 tablet    Take 1 tablet (20 mg) by mouth At Bedtime Needs office visit prior to further refills    Hyperlipidemia LDL goal <130       VALTREX PO      Take 1,000 mg by mouth daily

## 2018-04-30 NOTE — Clinical Note
Please abstract the following data from this visit with this patient into the appropriate field in Epic:  Colonoscopy done on this date: 5/2017 (approximately), by this group: [facility], results were : one polyp/otherwise normal. Repeat colonoscopy in 5 years.

## 2018-04-30 NOTE — PATIENT INSTRUCTIONS
After Visit Summary    * At your next visit/ annual physical exam:   * Return fasting for labs.   * Please return to clinic for your Shingles vaccine.       * Shin pain/cramping: make sure you have an adequate amount of water intake daily.      At [facility], we strive to deliver an exceptional experience to you, every time we see you.  If you receive a survey in the mail, please send us back your thoughts. We really do value your feedback.    Based on your medical history, these are the current health maintenance/preventive care services that you are due for (some may have been done at this visit.)  Health Maintenance Due   Topic Date Due     COLONOSCOPY Q5 YR  12/14/2015     PNEUMOCOCCAL (1 of 2 - PCV13) 08/21/2016     AORTIC ANEURYSM SCREENING (SYSTEM ASSIGNED)  08/21/2016     ADVANCE DIRECTIVE PLANNING Q5 YRS  01/02/2017     ALIYAH QUESTIONNAIRE 1 YEAR  08/01/2017     PHQ-9 Q1YR  08/01/2017     INFLUENZA VACCINE (1) 09/01/2017     FALL RISK ASSESSMENT  10/07/2017         Suggested websites for health information:  Www.[employer].OutSystems : Up to date and easily searchable information on multiple topics.  Www.medlineplus.gov : medication info, interactive tutorials, watch real surgeries online  Www.familydoctor.org : good info from the Academy of Family Physicians  Www.cdc.gov : public health info, travel advisories, epidemics (H1N1)  Www.aap.org : children's health info, normal development, vaccinations  Www.health.[facility].us : [place] dept of health, public health issues in [place], N1N1    Your care team:                            Family Medicine Internal Medicine   MD Kavon Mesa MD Shantel Branch-Fleming, MD Katya Georgiev PA-C Nam Ho, MD Pediatrics   GALLITO Blank, MD Ani Thakkar CNP, MD Deborah Mielke, MD Kim Thein, APRN [facility]      Clinic hours: Monday - Thursday 7 am-7 pm; Fridays 7 am-5 pm.   Urgent care:  Monday - Friday 11 am-9 pm; Saturday and Sunday 9 am-5 pm.  Pharmacy : Monday -Thursday 8 am-8 pm; Friday 8 am-6 pm; Saturday and Sunday 9 am-5 pm.     Clinic: [phone]   Pharmacy: [phone]

## 2018-04-30 NOTE — NURSING NOTE
Screening Questionnaire for Adult Immunization    Are you sick today?   No   Do you have allergies to medications, food, a vaccine component or latex?   No   Have you ever had a serious reaction after receiving a vaccination?   No   Do you have a long-term health problem with heart disease, lung disease, asthma, kidney disease, metabolic disease (e.g. diabetes), anemia, or other blood disorder?   No   Do you have cancer, leukemia, HIV/AIDS, or any other immune system problem?   No   In the past 3 months, have you taken medications that affect  your immune system, such as prednisone, other steroids, or anticancer drugs; drugs for the treatment of rheumatoid arthritis, Crohn s disease, or psoriasis; or have you had radiation treatments?   No   Have you had a seizure, or a brain or other nervous system problem?   No   During the past year, have you received a transfusion of blood or blood     products, or been given immune (gamma) globulin or antiviral drug?   No   For women: Are you pregnant or is there a chance you could become        pregnant during the next month?   No   Have you received any vaccinations in the past 4 weeks?   No     Immunization questionnaire answers were all negative.         Screening performed by Moises Castrejon on 4/30/2018 at 10:20 AM.

## 2018-04-30 NOTE — Clinical Note
Please abstract the following data from this visit with this patient into the appropriate field in Epic:  Colonoscopy done on this date: 5-11-17 (approximately), by this group: [facility], results were normal.

## 2018-04-30 NOTE — PROGRESS NOTES
SUBJECTIVE:   Kumar Gonzalez is a 67 year old male who presents to clinic today for the following health issues:    Hyperlipidemia Follow-Up      Rate your low fat/cholesterol diet?: poor    Taking statin?  Yes, no muscle aches from statin    Other lipid medications/supplements?:  Vitamin D and calcium    Amount of exercise or physical activity: 1 day/week for an average of 15-30 minutes    Problems taking medications regularly: No    Medication side effects: none    Diet: regular (no restrictions)    Physical:  Patient notes that last year, he had a physical done at the [facility]. He is thinking of doing this every other year. He had a colonoscopy (May 2017) done with them and was noted to have 1 polyp (sent to abstraction). Recommended to follow up for 5 year colonoscopy.     Medication:  Has been taking his Vitamin D and calcium religiously in the morning, however does skip a few nights a week. Has been taking simvastatin.     Rash:   Left sided facial rash and right hand. Has been clearing up since onset. He has a dermatology visit scheduled for May 10 for further eval. For treatment he tried using a topical cream (metrocream) and Cerave lotion.     Other:   - 3 episodes of charley horse feelings in the anterior leg/shin pain in 2018.  - Recurrent toenail fungus infection. Comes and goes. Not currently concerned.      Problem list and histories reviewed & adjusted, as indicated.  Additional history: as documented    Patient Active Problem List   Diagnosis     Alopecia     Family history of stroke (cerebrovascular)     HYPERLIPIDEMIA LDL GOAL <130     Colon polyp     BPH (benign prostatic hyperplasia)     Loss of height     Erectile dysfunction     Impacted cerumen     Rosacea     Osteoporosis     Lentigo     Family history of melanoma     History of actinic keratosis     Xerosis cutis     Advanced directives, counseling/discussion     Vitamin D deficiency     KP (keratosis pilaris)     AK (actinic keratosis)      "Anesthesia complication     Solar lentigo     Insomnia     Past Surgical History:   Procedure Laterality Date     C APPENDECTOMY  8th Grade     HC PNEUMATIC RETINOPEXY  Multiple    Bilateral       Social History   Substance Use Topics     Smoking status: Never Smoker     Smokeless tobacco: Never Used     Alcohol use Yes      Comment: occasionaly     Family History   Problem Relation Age of Onset     CEREBROVASCULAR DISEASE Father      Asthma No family hx of      C.A.D. No family hx of      DIABETES No family hx of      Hypertension No family hx of      Breast Cancer No family hx of      Cancer - colorectal No family hx of      Prostate Cancer No family hx of      DIABETES Paternal Grandfather      CANCER Brother      all uncles, and brother. Unsure type     CANCER Other      cousin with melanoma           Reviewed and updated as needed this visit by clinical staff  Tobacco  Allergies  Meds  Med Hx  Surg Hx  Fam Hx  Soc Hx      Reviewed and updated as needed this visit by Provider         ROS:  Constitutional, HEENT, cardiovascular, pulmonary, gi and gu systems are negative, except as otherwise noted.    This document serves as a record of the services and decisions personally performed and made by Suzanne Torres MD. It was created on her behalf by Naye Soto, a trained medical scribe. The creation of this document is based the provider's statements to the medical scribe.    Scribe Naye Soto 9:52 AM 4/30/2018      OBJECTIVE:     /80 (BP Location: Right arm, Patient Position: Chair, Cuff Size: Adult Regular)  Pulse 74  Temp 97.6  F (36.4  C)  Ht 1.715 m (5' 7.5\")  Wt 79.4 kg (175 lb)  SpO2 95%  BMI 27 kg/m2  Body mass index is 27 kg/(m^2).  GENERAL: healthy, alert and no distress  NECK: no adenopathy, no asymmetry, masses, or scars and thyroid normal to palpation  RESP: lungs clear to auscultation - no rales, rhonchi or wheezes  CV: regular rate and rhythm, normal S1 S2, no S3 or S4, no " murmur, click or rub, no peripheral edema and peripheral pulses strong  MS: no gross musculoskeletal defects noted, no edema  Skin: 1 cm erythematous plaque with rolled borders located over the left lateral cheek  NEURO: Normal strength and tone, mentation intact and speech normal  PSYCH: mentation appears normal, affect normal/bright    Diagnostic Test Results:  none     ASSESSMENT/PLAN:     1. Hyperlipidemia LDL goal <130  On statin.   - **Comprehensive metabolic panel FUTURE anytime; Future  - Lipid panel reflex to direct LDL Fasting; Future    2. Screen for colon cancer  Utd. Will get records    3. Need for prophylactic vaccination against Streptococcus pneumoniae (pneumococcus)  - PNEUMOCOCCAL VACCINE,ADULT,SQ OR IM    4. Osteoporosis, unspecified osteoporosis type, unspecified pathological fracture presence  Not sure if dexa was done last year at [facility]. Will try to get records and determine if further f/u needed this year.   - **Comprehensive metabolic panel FUTURE anytime; Future  - **Vitamin D Deficiency FUTURE anytime; Future  - **TSH with free T4 reflex FUTURE anytime; Future    5. Leg cramp  Rarely. Doubt due to statin since infrequent but discussed could be possibility. Reviewed good hydration and stretching.   - **Comprehensive metabolic panel FUTURE anytime; Future  - **Vitamin D Deficiency FUTURE anytime; Future  - **TSH with free T4 reflex FUTURE anytime; Future  - CK total; Future     Patient was wondering if f/u for cpe still needed. He is not interested in prostate exam  Will try to get [facility] records to see if there is other f/u that is needed this year.       Patient Instructions     After Visit Summary    * At your next visit/ annual physical exam:   * Return fasting for labs.   * Please return to clinic for your Shingles vaccine.       * Shin pain/cramping: make sure you have an adequate amount of water intake daily.      At [facility], we strive to deliver an exceptional  experience to you, every time we see you.  If you receive a survey in the mail, please send us back your thoughts. We really do value your feedback.    Based on your medical history, these are the current health maintenance/preventive care services that you are due for (some may have been done at this visit.)  Health Maintenance Due   Topic Date Due     COLONOSCOPY Q5 YR  12/14/2015     PNEUMOCOCCAL (1 of 2 - PCV13) 08/21/2016     AORTIC ANEURYSM SCREENING (SYSTEM ASSIGNED)  08/21/2016     ADVANCE DIRECTIVE PLANNING Q5 YRS  01/02/2017     ALIYAH QUESTIONNAIRE 1 YEAR  08/01/2017     PHQ-9 Q1YR  08/01/2017     INFLUENZA VACCINE (1) 09/01/2017     FALL RISK ASSESSMENT  10/07/2017         Suggested websites for health information:  Www.[employer].org : Up to date and easily searchable information on multiple topics.  Www.medlineplus.gov : medication info, interactive tutorials, watch real surgeries online  Www.familydoctor.org : good info from the Academy of Family Physicians  Www.cdc.gov : public health info, travel advisories, epidemics (H1N1)  Www.aap.org : children's health info, normal development, vaccinations  Www.health.state.[place].us : [place] dept of health, public health issues in [place], N1N1    Your care team:                            Family Medicine Internal Medicine   MD Kavon Mesa MD Shantel Branch-Fleming, MD Katya Georgiev PA-C Nam Ho, MD Pediatrics   GALLITO Blank, MD Ani Thakkar CNP, MD Deborah Mielke, MD Kim Thein, APRN [facility]      Clinic hours: Monday - Thursday 7 am-7 pm; Fridays 7 am-5 pm.   Urgent care: Monday - Friday 11 am-9 pm; Saturday and Sunday 9 am-5 pm.  Pharmacy : Monday -Thursday 8 am-8 pm; Friday 8 am-6 pm; Saturday and Sunday 9 am-5 pm.     Clinic: [phone]   Pharmacy: [phone]           The information in this document, created by a scribe for me, accurately reflects the services I  personally performed and the decisions made by me. I have reviewed and approved this document for accuracy.      Suzanne Torres MD  [facility]

## 2018-04-30 NOTE — NURSING NOTE
Health Maintenance items due:    PHQ9- Patient given questionnaire during rooming process    Composite Cancer Quality Initiative    Colon Cancer Initiative satisfied?  YES - completed at [facility] on this date 5-11-17      Updated by: Moises Castrejon MA

## 2018-05-04 ENCOUNTER — TELEPHONE (OUTPATIENT)
Dept: FAMILY MEDICINE | Facility: CLINIC | Age: 68
End: 2018-05-04

## 2018-05-04 DIAGNOSIS — E78.5 HYPERLIPIDEMIA LDL GOAL <130: ICD-10-CM

## 2018-05-04 DIAGNOSIS — R25.2 LEG CRAMP: ICD-10-CM

## 2018-05-04 DIAGNOSIS — M81.0 OSTEOPOROSIS, UNSPECIFIED OSTEOPOROSIS TYPE, UNSPECIFIED PATHOLOGICAL FRACTURE PRESENCE: ICD-10-CM

## 2018-05-04 LAB
ALBUMIN SERPL-MCNC: 3.8 G/DL (ref 3.4–5)
ALP SERPL-CCNC: 59 U/L (ref 40–150)
ALT SERPL W P-5'-P-CCNC: 17 U/L (ref 0–70)
ANION GAP SERPL CALCULATED.3IONS-SCNC: 8 MMOL/L (ref 3–14)
AST SERPL W P-5'-P-CCNC: 14 U/L (ref 0–45)
BILIRUB SERPL-MCNC: 0.8 MG/DL (ref 0.2–1.3)
BUN SERPL-MCNC: 26 MG/DL (ref 7–30)
CALCIUM SERPL-MCNC: 8.8 MG/DL (ref 8.5–10.1)
CHLORIDE SERPL-SCNC: 108 MMOL/L (ref 94–109)
CHOLEST SERPL-MCNC: 134 MG/DL
CK SERPL-CCNC: 67 U/L (ref 30–300)
CO2 SERPL-SCNC: 27 MMOL/L (ref 20–32)
CREAT SERPL-MCNC: 1.26 MG/DL (ref 0.66–1.25)
DEPRECATED CALCIDIOL+CALCIFEROL SERPL-MC: 28 UG/L (ref 20–75)
GFR SERPL CREATININE-BSD FRML MDRD: 57 ML/MIN/1.7M2
GLUCOSE SERPL-MCNC: 92 MG/DL (ref 70–99)
HDLC SERPL-MCNC: 43 MG/DL
LDLC SERPL CALC-MCNC: 74 MG/DL
NONHDLC SERPL-MCNC: 91 MG/DL
POTASSIUM SERPL-SCNC: 4.8 MMOL/L (ref 3.4–5.3)
PROT SERPL-MCNC: 7.1 G/DL (ref 6.8–8.8)
SODIUM SERPL-SCNC: 143 MMOL/L (ref 133–144)
TRIGL SERPL-MCNC: 83 MG/DL
TSH SERPL DL<=0.005 MIU/L-ACNC: 2.51 MU/L (ref 0.4–4)

## 2018-05-04 PROCEDURE — 36415 COLL VENOUS BLD VENIPUNCTURE: CPT | Performed by: FAMILY MEDICINE

## 2018-05-04 PROCEDURE — 80061 LIPID PANEL: CPT | Performed by: FAMILY MEDICINE

## 2018-05-04 PROCEDURE — 82550 ASSAY OF CK (CPK): CPT | Performed by: FAMILY MEDICINE

## 2018-05-04 PROCEDURE — 84443 ASSAY THYROID STIM HORMONE: CPT | Performed by: FAMILY MEDICINE

## 2018-05-04 PROCEDURE — 80053 COMPREHEN METABOLIC PANEL: CPT | Performed by: FAMILY MEDICINE

## 2018-05-04 PROCEDURE — 82306 VITAMIN D 25 HYDROXY: CPT | Performed by: FAMILY MEDICINE

## 2018-05-04 NOTE — TELEPHONE ENCOUNTER
Pt returned call    Best number to reach caller: Home number on file [phone] (home)    Is it ok to leave a detailed message: Not Applicable     Patient scheduled the shingle shot/appt

## 2018-05-04 NOTE — LETTER
May 4, 2018      Kumar Carlos  [address] [place] [address]        Dear Kumar,       It was a pleasure seeing you at your recent visit. Your labs have been reviewed and are attached.     The fasting blood glucose (diabetes screening test) was negative/normal.     The kidney function test (gfr) was borderline low. We can sometimes see this when you are fasting for the testing, especially if you are not well hydrated.     The electrolytes and liver panel were normal.     Vitamin D level was lower range of normal. Please consider upping the daily vitamin d supplement intake by 1,000 IU per day     The muscle test (ck) was normal.     The cholesterol panel looks good! Please continue your current medication.     I would suggest a recheck of your kidney function in a month with a lab only visit (does not need to be fasting). Please hydrate well prior to the test. I would also suggest avoiding anti-inflammatory drugs such as ibuprofen and aleve to help keep your kidneys healthy.         Sincerely,        Suzanne Torres MD       Results for orders placed or performed in visit on 05/04/18   **Comprehensive metabolic panel FUTURE anytime   Result Value Ref Range    Sodium 143 133 - 144 mmol/L    Potassium 4.8 3.4 - 5.3 mmol/L    Chloride 108 94 - 109 mmol/L    Carbon Dioxide 27 20 - 32 mmol/L    Anion Gap 8 3 - 14 mmol/L    Glucose 92 70 - 99 mg/dL    Urea Nitrogen 26 7 - 30 mg/dL    Creatinine 1.26 (H) 0.66 - 1.25 mg/dL    GFR Estimate 57 (L) >60 mL/min/1.7m2    GFR Estimate If Black 69 >60 mL/min/1.7m2    Calcium 8.8 8.5 - 10.1 mg/dL    Bilirubin Total 0.8 0.2 - 1.3 mg/dL    Albumin 3.8 3.4 - 5.0 g/dL    Protein Total 7.1 6.8 - 8.8 g/dL    Alkaline Phosphatase 59 40 - 150 U/L    ALT 17 0 - 70 U/L    AST 14 0 - 45 U/L   **Vitamin D Deficiency FUTURE anytime   Result Value Ref Range    Vitamin D Deficiency screening 28 20 - 75 ug/L   Lipid panel reflex to direct LDL Fasting   Result Value Ref Range     Cholesterol 134 <200 mg/dL    Triglycerides 83 <150 mg/dL    HDL Cholesterol 43 >39 mg/dL    LDL Cholesterol Calculated 74 <100 mg/dL    Non HDL Cholesterol 91 <130 mg/dL   **TSH with free T4 reflex FUTURE anytime   Result Value Ref Range    TSH 2.51 0.40 - 4.00 mU/L   CK total   Result Value Ref Range    CK Total 67 30 - 300 U/L

## 2018-05-04 NOTE — TELEPHONE ENCOUNTER
Looks like patient refused the vaccination in 2017 according immunization reconcile/outside record.   Patient is due for shingle vaccination if he still like one.     This writer attempted to contact 1 on 05/04/18      Reason for call Vaccination and left message.      If patient calls back:   Schedule patient.      Ciarra Moseley, Medical Assistant

## 2018-05-07 DIAGNOSIS — E78.5 HYPERLIPIDEMIA LDL GOAL <130: ICD-10-CM

## 2018-05-07 NOTE — TELEPHONE ENCOUNTER
"Requested Prescriptions   Pending Prescriptions Disp Refills     simvastatin (ZOCOR) 20 MG tablet [Pharmacy Med Name: Simvastatin Oral Tablet 20 MG] 15 tablet 0     Sig: TAKE ONE TABLET BY MOUTH AT BEDTIME    Statins Protocol Passed    5/7/2018  3:18 PM       Passed - LDL on file in past 12 months    Recent Labs   Lab Test  05/04/18   0658   LDL  74            Passed - No abnormal creatine kinase in past 12 months    Recent Labs   Lab Test  05/04/18   0658   CKT  67               Passed - Recent (12 mo) or future (30 days) visit within the authorizing provider's specialty    Patient had office visit in the last 12 months or has a visit in the next 30 days with authorizing provider or within the authorizing provider's specialty.  See \"Patient Info\" tab in inbasket, or \"Choose Columns\" in Meds & Orders section of the refill encounter.           Passed - Patient is age 18 or older        simvastatin (ZOCOR) 20 MG tablet  Last Written Prescription Date:  4/16/18  Last Fill Quantity: 15,  # refills: 0   Last office visit: 4/30/2018 with prescribing provider:  Dr. Torres   Future Office Visit:   Next 5 appointments (look out 90 days)     May 10, 2018  9:00 AM CDT   Nurse [facility] with [facility]   [facility]    [address] [place] [id number] [phone]            May 10, 2018  9:45 AM CDT   Return Visit with Misty Staples MD   [facility]    [id number] [place] [id number] [phone]                   "

## 2018-05-10 ENCOUNTER — ALLIED HEALTH/NURSE VISIT (OUTPATIENT)
Dept: NURSING | Facility: CLINIC | Age: 68
End: 2018-05-10
Payer: COMMERCIAL

## 2018-05-10 ENCOUNTER — OFFICE VISIT (OUTPATIENT)
Dept: DERMATOLOGY | Facility: CLINIC | Age: 68
End: 2018-05-10
Payer: COMMERCIAL

## 2018-05-10 VITALS
WEIGHT: 178.2 LBS | TEMPERATURE: 97.5 F | BODY MASS INDEX: 27.01 KG/M2 | HEART RATE: 56 BPM | HEIGHT: 68 IN | RESPIRATION RATE: 20 BRPM

## 2018-05-10 DIAGNOSIS — L57.0 AK (ACTINIC KERATOSIS): ICD-10-CM

## 2018-05-10 DIAGNOSIS — Z23 NEED FOR VACCINATION: Primary | ICD-10-CM

## 2018-05-10 DIAGNOSIS — D48.5 NEOPLASM OF UNCERTAIN BEHAVIOR OF SKIN: Primary | ICD-10-CM

## 2018-05-10 DIAGNOSIS — L71.9 ROSACEA: ICD-10-CM

## 2018-05-10 PROCEDURE — 90750 HZV VACC RECOMBINANT IM: CPT

## 2018-05-10 PROCEDURE — 11100 HC DESTRUCT PREMALIGNANT LESION, FIRST: CPT | Mod: 59 | Performed by: DERMATOLOGY

## 2018-05-10 PROCEDURE — 99207 ZZC NO CHARGE NURSE ONLY: CPT

## 2018-05-10 PROCEDURE — 90471 IMMUNIZATION ADMIN: CPT

## 2018-05-10 PROCEDURE — 17003 DESTRUCT PREMALG LES 2-14: CPT | Performed by: DERMATOLOGY

## 2018-05-10 PROCEDURE — 17000 DESTRUCT PREMALG LESION: CPT | Performed by: DERMATOLOGY

## 2018-05-10 PROCEDURE — 11101 HC DESTRUCT PREMALIGNANT LESION, 2-14: CPT | Performed by: DERMATOLOGY

## 2018-05-10 PROCEDURE — 99213 OFFICE O/P EST LOW 20 MIN: CPT | Mod: 25 | Performed by: DERMATOLOGY

## 2018-05-10 PROCEDURE — 88305 TISSUE EXAM BY PATHOLOGIST: CPT | Mod: TC | Performed by: DERMATOLOGY

## 2018-05-10 RX ORDER — SIMVASTATIN 20 MG
TABLET ORAL
Qty: 90 TABLET | Refills: 3 | Status: SHIPPED | OUTPATIENT
Start: 2018-05-10 | End: 2019-02-20

## 2018-05-10 ASSESSMENT — PAIN SCALES - GENERAL: PAINLEVEL: NO PAIN (0)

## 2018-05-10 NOTE — TELEPHONE ENCOUNTER
Routing refill request to provider for review/approval because:  Labs out of range:  Creatinine - patient was just in on 5/4/18 for labs.   CREAT 0.66 - 1.25 mg/dL 1.26 !     Soledad Jacobsen RN

## 2018-05-10 NOTE — NURSING NOTE
Screening Questionnaire for Adult Immunization    Are you sick today?   No   Do you have allergies to medications, food, a vaccine component or latex?   No   Have you ever had a serious reaction after receiving a vaccination?   No   Do you have a long-term health problem with heart disease, lung disease, asthma, kidney disease, metabolic disease (e.g. diabetes), anemia, or other blood disorder?   No   Do you have cancer, leukemia, HIV/AIDS, or any other immune system problem?   No   In the past 3 months, have you taken medications that affect  your immune system, such as prednisone, other steroids, or anticancer drugs; drugs for the treatment of rheumatoid arthritis, Crohn s disease, or psoriasis; or have you had radiation treatments?   No   Have you had a seizure, or a brain or other nervous system problem?   No   During the past year, have you received a transfusion of blood or blood     products, or been given immune (gamma) globulin or antiviral drug?   No   For women: Are you pregnant or is there a chance you could become        pregnant during the next month?   No   Have you received any vaccinations in the past 4 weeks?   Yes, Pneumococcal      Immunization questionnaire was positive for at least one answer.  Notified Dr. Perry.        Per orders of Dr. Perry, injection of Shingrix given by Jany Damon. Patient instructed to remain in clinic for 15 minutes afterwards, and to report any adverse reaction to me immediately.     Due to injection administration, patient instructed to remain in clinic for 15 minutes  afterwards, and to report any adverse reaction to me immediately.    Screening performed by Jany Damon on 5/10/2018 at 9:24 AM.

## 2018-05-10 NOTE — PROGRESS NOTES
[facility] Dermatology Note      Dermatology Problem List:  1. Family history of melanoma  2.  AK  3. Onychomycosis  - s/p Terbinafine 250 mg fall 2015    Encounter Date: May 10, 2018    CC:   Chief Complaint   Patient presents with     Skin Check     lesions on hands and face - red spots, little flaky - Hx of AK         History of Present Illness:  This 67 year old male with a personal history of actinic keratosis presents for evaluation of AKs on the face and hands and a family history of melanoma. The patient was last seen 4/5/2016 when the patient was treated with cryo for AKs. The patient has a spot on the left cheek that has been tender since last May. The patient reports that there is a spot on the hand that is actually recently becoming better, but appeared recently.       Past Medical History:   Patient Active Problem List   Diagnosis     Alopecia     Family history of stroke (cerebrovascular)     HYPERLIPIDEMIA LDL GOAL <130     Colon polyp     BPH (benign prostatic hyperplasia)     Loss of height     Erectile dysfunction     Impacted cerumen     Rosacea     Osteoporosis     Lentigo     Family history of melanoma     History of actinic keratosis     Xerosis cutis     Advanced directives, counseling/discussion     Vitamin D deficiency     KP (keratosis pilaris)     AK (actinic keratosis)     Anesthesia complication     Solar lentigo     Insomnia     Past Medical History:   Diagnosis Date     Actinic keratosis      ALOPECIA NOS 3/24/2008     BPH (benign prostatic hyperplasia) 12/1/2010     Erectile dysfunction 12/1/2010     Hyperlipidemia LDL goal <130 10/31/2010     Seizure (H) 10/28/2008    after general anesthesia     Vitamin D deficiency 1/5/2012    Mild, noted 2011 and 2012     Past Surgical History:   Procedure Laterality Date     C APPENDECTOMY  8th Grade     HC PNEUMATIC RETINOPEXY  Multiple    Bilateral       Social History:  The patient works as a . Loves  shayy.    Family History:  Family history of melanoma    Medications:  Current Outpatient Prescriptions   Medication Sig Dispense Refill     Calcium Carbonate-Vitamin D (CALCIUM + D PO) Take by mouth daily 1200 MG/1000 MG D3       metroNIDAZOLE (METROCREAM) 0.75 % cream Apply topically daily 45 g 11     order for DME Equipment being ordered: [id number], $242, , WALKING, BOOT, LG, SHORT PNEUM 1 Device 0     prednisoLONE acetate (PRED FORTE) 1 % ophthalmic suspension        simvastatin (ZOCOR) 20 MG tablet Take 1 tablet (20 mg) by mouth At Bedtime Needs office visit prior to further refills 15 tablet 0     ValACYclovir HCl (VALTREX PO) Take 1,000 mg by mouth daily           No Known Allergies      Review of Systems:  -As per HPI  -Constitutional: The patient is generally feeling well.    Physical exam:  There were no vitals taken for this visit.  -GEN: This is a well developed, well-nourished male in no acute distress, in a pleasant mood.    -SKIN: Total skin excluding the undergarment areas was performed. The exam included the head/face, neck, both arms, chest, back, abdomen, both legs, digits and/or nails.   - There are erythematous macules with overyling adherent scale on the left hand.   -1 cm erythematous patch with scale. Mid back  - Waxy stuck on papule Most likely SK on the left preauricular. About 1.1cm  -Lichenified papule on the right dorsal hand.about 1cm  -No other lesions of concern on areas examined.     Impression/Plan:  1. Family history of melanoma     Recommend sunscreens SPF #30 or greater, protective clothing and avoidance of tanning beds.    ABCD's of melanoma were reviewed with patient and handout provided.   2. NUB Lichenified papule on the right dorsal hand. NUB ddx includes prurigo nodule vs SK.     After discussion of benefits and risks including but not limited to bleeding, infection, scar, incomplete removal, recurrence, and non-diagnostic biopsy, written consent and photographs were  obtained. The area was cleaned with isopropyl alcohol. 0.5 mL of 1% lidocaine with epinephrine was injected to obtain adequate anesthesia of the lesion on the right dorsal hand. A shave biopsy was performed. Hemostasis was achieved with aluminium chloride. Vaseline and a sterile dressing were applied. The patient tolerated the procedure and no complications were noted. The patient was provided with verbal and written post care instructions.   3. Mid back. 1 cm erythematous patch with scale. NUB. Ddx includes AK vs BCC.     After discussion of benefits and risks including but not limited to bleeding, infection, scar, incomplete removal, recurrence, and non-diagnostic biopsy, written consent and photographs were obtained. The area was cleaned with isopropyl alcohol. 0.5 mL of 1% lidocaine with epinephrine was injected to obtain adequate anesthesia of the lesion on the mid back. A shave biopsy was performed. Hemostasis was achieved with aluminium chloride. Vaseline and a sterile dressing were applied. The patient tolerated the procedure and no complications were noted. The patient was provided with verbal and written post care instructions.   4. Waxy stuck on papule. NUB. Most likely SK on the left preauricular.     After discussion of benefits and risks including but not limited to bleeding, infection, scar, incomplete removal, recurrence, and non-diagnostic biopsy, written consent and photographs were obtained. The area was cleaned with isopropyl alcohol. 0.5 mL of 1% lidocaine with epinephrine was injected to obtain adequate anesthesia of the lesion on the left preauicular. A shave biopsy was performed. Hemostasis was achieved with aluminium chloride. Vaseline and a sterile dressing were applied. The patient tolerated the procedure and no complications were noted. The patient was provided with verbal and written post care instructions.   5. Actinic keratoses:  Cryotherapy procedure note: After verbal consent and  discussion of risks and benefits including but no limited to dyspigmentation/scar, blister, and pain, 3 was(were) treated with 1-2mm freeze border for 2 cycles with liquid nitrogen. Post cryotherapy instructions were provided.     6. Hx of rosacea- doing well on metrocream  Follow-up in 1 year, earlier pending biopsy, earlier for new or changing lesions.     Staff Involved:  Scribe/Staff    Scribe Disclosure:   I, Katie Haque, am serving as a scribe to document services personally performed by Dr. Misty Staples, based on data collection and the provider's statements to me.       Provider Disclosure:   The documentation recorded by the scribe accurately reflects the services I personally performed and the decisions made by me.    Misty Staples MD    Department of Dermatology  [facility]: Phone: [phone], Fax:[phone]  [facility]: Phone: [phone], Fax: [phone]

## 2018-05-10 NOTE — TELEPHONE ENCOUNTER
Patient stopped in today and has been out of the simvastatin for 5 days.  He has completed his labs.   He uses [facility] in [place].

## 2018-05-10 NOTE — MR AVS SNAPSHOT
After Visit Summary   5/10/2018    Kumar Gonzalez    MRN: [id number]           Patient Information     Date Of Birth          1950        Visit Information        Provider Department      5/10/2018 9:45 AM Misty Staples MD [facility]        Today's Diagnoses     Neoplasm of uncertain behavior of skin    -  1    AK (actinic keratosis)        Rosacea          Care Instructions    CRYOTHERAPY    What is it?    Use of a very cold liquid, such as liquid nitrogen, to freeze and destroy abnormal skin cells that need to be removed    What should I expect?    Tenderness and redness    A small blister that might grow and fill with dark purple blood. There may be crusting.    More than one treatment may be needed if the lesions do not go away.    How do I care for the treated area?    Gently wash the area with your hands when bathing.    Use a thin layer of Vaselin to help with healing. You may use a Band-Aid.     The area should heal within 7-10 days.    Do not use an antibiotic or Neosporin ointment.     You may take acetaminophen (Tylenol) for pain.     Call your Doctor if you have:    Severe pain    Signs of infection (warmth, redness, cloudy yellow drainage, and or a bad smell)    Questions or concerns    Contact infromation:  [facility] [phone]  [facility] [phone]      Wound Care After a Biopsy    What is a skin biopsy?  A skin biopsy allows the doctor to examine a very small piece of tissue under the microscope to determine the diagnosis and the best treatment for the skin condition. A local anesthetic (numbing medicine)  is injected with a very small needle into the skin area to be tested. A small piece of skin is taken from the area. Sometimes a suture (stitch) is used.     What are the risks of a skin biopsy?  I will experience scar, bleeding, swelling, pain, crusting and redness. I may experience incomplete  removal or recurrence. Risks of this procedure are excessive bleeding, bruising, infection, nerve damage, numbness, thick (hypertrophic or keloidal) scar and non-diagnostic biopsy.    How should I care for my wound for the first 24 hours?    Keep the wound dry and covered for 24 hours    If it bleeds, hold direct pressure on the area for 15 minutes. If bleeding does not stop then go to the emergency room    Avoid strenuous exercise the first 1-2 days or as your doctor instructs you    How should I care for the wound after 24 hours?    After 24 hours, remove the bandage    You may bathe or shower as normal    If you had a scalp biopsy, you can shampoo as usual and can use shower water to clean the biopsy site daily    Clean the wound twice a day with gentle soap and water    Do not scrub, be gentle    Apply white petroleum/Vaseline after cleaning the wound with a cotton swab or a clean finger, and keep the site covered with a Bandaid /bandage. Bandages are not necessary with a scalp biopsy    If you are unable to cover the site with a Bandaid /bandage, re-apply ointment 2-3 times a day to keep the site moist. Moisture will help with healing    Avoid strenuous activity for first 1-2 days    Avoid lakes, rivers, pools, and oceans until the stitches are removed or the site is healed    How do I clean my wound?    Wash hands thoroughly with soap or use hand  before all wound care    Clean the wound with gentle soap and water    Apply white petroleum/Vaseline  to wound after it is clean    Replace the Bandaid /bandage to keep the wound covered for the first few days or as instructed by your doctor    If you had a scalp biopsy, warm shower water to the area on a daily basis should suffice    What should I use to clean my wound?     Cotton-tipped applicators (Qtips )    White petroleum jelly (Vaseline ). Use a clean new container and use Q-tips to apply.    Bandaids   as needed    Gentle soap     How should I care  for my wound long term?    Do not get your wound dirty    Keep up with wound care for one week or until the area is healed.    A small scab will form and fall off by itself when the area is completely healed. The area will be red and will become pink in color as it heals. Sun protection is very important for how your scar will turn out. Sunscreen with an SPF 30 or greater is recommended once the area is healed.    You should have some soreness but it should be mild and slowly go away over several days. Talk to your doctor about using tylenol for pain,    When should I call my doctor?  If you have increased:     Pain or swelling    Pus or drainage (clear or slightly yellow drainage is ok)    Temperature over 100F    Spreading redness or warmth around wound    When will I hear about my results?  The biopsy results can take 2-3 weeks to come back. The clinic will call you with the results, send you a [employer] message, or have you schedule a follow-up clinic or phone time to discuss the results. Contact our clinics if you do not hear from us in 3 weeks.     Who should I call with questions?    [facility]: [phone]     [facility]: [phone]    For urgent needs outside of business hours call the [facility] at [phone] and ask for the dermatology resident on call    Cryotherapy    What is it?    Use of a very cold liquid, such as liquid nitrogen, to freeze and destroy abnormal skin cells that need to be removed    What should I expect?    Tenderness and redness    A small blister that might grow and fill with dark purple blood. There may be crusting.    More than one treatment may be needed if the lesions do not go away.    How do I care for the treated area?    Gently wash the area with your hands when bathing.    Use a thin layer of Vaseline to help with healing. You may use a Band-Aid.     The area should heal within 7-10 days and may leave  behind a pink or lighter color.     Do not use an antibiotic or Neosporin ointment.     You may take acetaminophen (Tylenol) for pain.     Call your Doctor if you have:    Severe pain    Signs of infection (warmth, redness, cloudy yellow drainage, and or a bad smell)    Questions or concerns    Who should I call with questions?       [facility]: [phone]       [facility]: [phone]       For urgent needs outside of business hours call the [facility] at [phone]        and ask for the dermatology resident on call            Follow-ups after your visit        Follow-up notes from your care team     Return in about 1 year (around 5/10/2019).      Who to contact     If you have questions or need follow up information about today's clinic visit or your schedule please contact [facility] directly at [phone].  Normal or non-critical lab and imaging results will be communicated to you by MyChart, letter or phone within 4 business days after the clinic has received the results. If you do not hear from us within 7 days, please contact the clinic through [employer] or phone. If you have a critical or abnormal lab result, we will notify you by phone as soon as possible.  Submit refill requests through [employer] or call your pharmacy and they will forward the refill request to us. Please allow 3 business days for your refill to be completed.          Additional Information About Your Visit        [employer] Information     [employer] is an electronic gateway that provides easy, online access to your medical records. With [employer], you can request a clinic appointment, read your test results, renew a prescription or communicate with your care team.     To sign up for [employer] visit the website at [email or web address]   You will be asked to enter the access code listed below, as well as some personal information. Please follow the  directions to create your username and password.     Your access code is: [email or web address]  Expires: 2018 10:13 AM     Your access code will  in 90 days. If you need help or a new code, please contact your [facility] or call [phone] for assistance.        Care EveryWhere ID     This is your Care EveryWhere ID. This could be used by other organizations to access your [place] medical records  [id number]         Blood Pressure from Last 3 Encounters:   18 130/80   17 108/60   17 120/80    Weight from Last 3 Encounters:   05/10/18 80.8 kg (178 lb 3.2 oz)   18 79.4 kg (175 lb)   17 84.8 kg (187 lb)              We Performed the Following     BIOPSY SKIN/SUBQ/MUC MEM, EACH ADDTL LESION     BIOPSY SKIN/SUBQ/MUC MEM, SINGLE LESION     Dermatological path order and indications     DESTRUCT PREMALIGNANT LESION, 2-14     DESTRUCT PREMALIGNANT LESION, FIRST          Where to get your medicines      These medications were sent to [id number] - [place], [place] - [address]  [address], [facility] [address]     Phone:  [phone]     metroNIDAZOLE 0.75 % cream          Primary Care Provider Office Phone # Fax #    Suzanne Torres [phone] [address]  [facility] [address]        Equal Access to Services     [facility]: Hadii aad ku hadasho Soomaali, waaxda luqadaha, qaybta kaalmada adeegyada, waxay eliceo parkinson. So [facility] [phone].    ATENCIÓN: Si habla español, tiene a alcantar disposición servicios gratuitos de asistencia lingüística. Llame al [phone].    We comply with applicable federal civil rights laws and [place] laws. We do not discriminate on the basis of race, color, national origin, age, disability, sex, sexual orientation, or gender identity.            Thank you!     Thank you for choosing [facility]  for your care. Our  goal is always to provide you with excellent care. Hearing back from our patients is one way we can continue to improve our services. Please take a few minutes to complete the written survey that you may receive in the mail after your visit with us. Thank you!             Your Updated Medication List - Protect others around you: Learn how to safely use, store and throw away your medicines at www.disposemymeds.org.          This list is accurate as of 5/10/18 10:44 AM.  Always use your most recent med list.                   Brand Name Dispense Instructions for use Diagnosis    CALCIUM + D PO      Take by mouth daily 1200 MG/1000 MG D3        metroNIDAZOLE 0.75 % cream    METROCREAM    45 g    Apply topically daily    Rosacea       order for DME     1 Device    Equipment being ordered: [id number], $242, , WALKING, BOOT, LG, SHORT PNEUM    Acute right ankle pain       prednisoLONE acetate 1 % ophthalmic susp    PRED FORTE          simvastatin 20 MG tablet    ZOCOR    15 tablet    Take 1 tablet (20 mg) by mouth At Bedtime Needs office visit prior to further refills    Hyperlipidemia LDL goal <130       VALTREX PO      Take 1,000 mg by mouth daily

## 2018-05-10 NOTE — NURSING NOTE
Dermatology Rooming Note    Kumar Gonzalez's goals for this visit include:   Chief Complaint   Patient presents with     Skin Check     lesions on hands and face - red spots, little flaky - Hx of AK       Are records needed for this visit(If yes, obtain release of information): NO     Vitals: There were no vitals taken for this visit.    Referring Provider:  No referring provider defined for this encounter.      Ellen Tamayo, CMA

## 2018-05-10 NOTE — LETTER
5/10/2018         RE: Kumar Gonzalez  [id number] [address]  [facility] [address]        Dear Colleague,    Thank you for referring your patient, Kumar Gonzalez, to the [facility] [place]. Please see a copy of my visit note below.    [facility] Dermatology Note      Dermatology Problem List:  1. Family history of melanoma  2.  AK  3. Onychomycosis  - s/p Terbinafine 250 mg fall 2015    Encounter Date: May 10, 2018    CC:   Chief Complaint   Patient presents with     Skin Check     lesions on hands and face - red spots, little flaky - Hx of AK         History of Present Illness:  This 67 year old male with a personal history of actinic keratosis presents for evaluation of AKs on the face and hands and a family history of melanoma. The patient was last seen 4/5/2016 when the patient was treated with cryo for AKs. The patient has a spot on the left cheek that has been tender since last May. The patient reports that there is a spot on the hand that is actually recently becoming better, but appeared recently.       Past Medical History:   Patient Active Problem List   Diagnosis     Alopecia     Family history of stroke (cerebrovascular)     HYPERLIPIDEMIA LDL GOAL <130     Colon polyp     BPH (benign prostatic hyperplasia)     Loss of height     Erectile dysfunction     Impacted cerumen     Rosacea     Osteoporosis     Lentigo     Family history of melanoma     History of actinic keratosis     Xerosis cutis     Advanced directives, counseling/discussion     Vitamin D deficiency     KP (keratosis pilaris)     AK (actinic keratosis)     Anesthesia complication     Solar lentigo     Insomnia     Past Medical History:   Diagnosis Date     Actinic keratosis      ALOPECIA NOS 3/24/2008     BPH (benign prostatic hyperplasia) 12/1/2010     Erectile dysfunction 12/1/2010     Hyperlipidemia LDL goal <130 10/31/2010     Seizure (H) 10/28/2008    after general anesthesia     Vitamin D deficiency  1/5/2012    Mild, noted 2011 and 2012     Past Surgical History:   Procedure Laterality Date     C APPENDECTOMY  8th Grade     HC PNEUMATIC RETINOPEXY  Multiple    Bilateral       Social History:  The patient works as a . Loves shayy.    Family History:  Family history of melanoma    Medications:  Current Outpatient Prescriptions   Medication Sig Dispense Refill     Calcium Carbonate-Vitamin D (CALCIUM + D PO) Take by mouth daily 1200 MG/1000 MG D3       metroNIDAZOLE (METROCREAM) 0.75 % cream Apply topically daily 45 g 11     order for DME Equipment being ordered: [id number], $242, , WALKING, BOOT, LG, SHORT PNEUM 1 Device 0     prednisoLONE acetate (PRED FORTE) 1 % ophthalmic suspension        simvastatin (ZOCOR) 20 MG tablet Take 1 tablet (20 mg) by mouth At Bedtime Needs office visit prior to further refills 15 tablet 0     ValACYclovir HCl (VALTREX PO) Take 1,000 mg by mouth daily           No Known Allergies      Review of Systems:  -As per HPI  -Constitutional: The patient is generally feeling well.    Physical exam:  There were no vitals taken for this visit.  -GEN: This is a well developed, well-nourished male in no acute distress, in a pleasant mood.    -SKIN: Total skin excluding the undergarment areas was performed. The exam included the head/face, neck, both arms, chest, back, abdomen, both legs, digits and/or nails.   - There are erythematous macules with overyling adherent scale on the left hand.   -1 cm erythematous patch with scale. Mid back  - Waxy stuck on papule Most likely SK on the left preauricular. About 1.1cm  -Lichenified papule on the right dorsal hand.about 1cm  -No other lesions of concern on areas examined.     Impression/Plan:  1. Family history of melanoma     Recommend sunscreens SPF #30 or greater, protective clothing and avoidance of tanning beds.    ABCD's of melanoma were reviewed with patient and handout provided.   2. NUB Lichenified papule on the right  dorsal hand. NUB ddx includes prurigo nodule vs SK.     After discussion of benefits and risks including but not limited to bleeding, infection, scar, incomplete removal, recurrence, and non-diagnostic biopsy, written consent and photographs were obtained. The area was cleaned with isopropyl alcohol. 0.5 mL of 1% lidocaine with epinephrine was injected to obtain adequate anesthesia of the lesion on the right dorsal hand. A shave biopsy was performed. Hemostasis was achieved with aluminium chloride. Vaseline and a sterile dressing were applied. The patient tolerated the procedure and no complications were noted. The patient was provided with verbal and written post care instructions.   3. Mid back. 1 cm erythematous patch with scale. NUB. Ddx includes AK vs BCC.     After discussion of benefits and risks including but not limited to bleeding, infection, scar, incomplete removal, recurrence, and non-diagnostic biopsy, written consent and photographs were obtained. The area was cleaned with isopropyl alcohol. 0.5 mL of 1% lidocaine with epinephrine was injected to obtain adequate anesthesia of the lesion on the mid back. A shave biopsy was performed. Hemostasis was achieved with aluminium chloride. Vaseline and a sterile dressing were applied. The patient tolerated the procedure and no complications were noted. The patient was provided with verbal and written post care instructions.   4. Waxy stuck on papule. NUB. Most likely SK on the left preauricular.     After discussion of benefits and risks including but not limited to bleeding, infection, scar, incomplete removal, recurrence, and non-diagnostic biopsy, written consent and photographs were obtained. The area was cleaned with isopropyl alcohol. 0.5 mL of 1% lidocaine with epinephrine was injected to obtain adequate anesthesia of the lesion on the left preauicular. A shave biopsy was performed. Hemostasis was achieved with aluminium chloride. Vaseline and a sterile  dressing were applied. The patient tolerated the procedure and no complications were noted. The patient was provided with verbal and written post care instructions.   5. Actinic keratoses:  Cryotherapy procedure note: After verbal consent and discussion of risks and benefits including but no limited to dyspigmentation/scar, blister, and pain, 3 was(were) treated with 1-2mm freeze border for 2 cycles with liquid nitrogen. Post cryotherapy instructions were provided.     6. Hx of rosacea- doing well on metrocream  Follow-up in 1 year, earlier pending biopsy, earlier for new or changing lesions.     Staff Involved:  Scribe/Staff    Scribe Disclosure:   I, Katie Haque, am serving as a scribe to document services personally performed by Dr. Misty Staples, based on data collection and the provider's statements to me.       Provider Disclosure:   The documentation recorded by the scribe accurately reflects the services I personally performed and the decisions made by me.    Misty Staples MD    Department of Dermatology  [facility]: Phone: [phone], Fax:[phone]  [facility]: Phone: [phone], Fax: [phone]        Again, thank you for allowing me to participate in the care of your patient.        Sincerely,        Misty Staples MD

## 2018-05-10 NOTE — PATIENT INSTRUCTIONS
CRYOTHERAPY    What is it?    Use of a very cold liquid, such as liquid nitrogen, to freeze and destroy abnormal skin cells that need to be removed    What should I expect?    Tenderness and redness    A small blister that might grow and fill with dark purple blood. There may be crusting.    More than one treatment may be needed if the lesions do not go away.    How do I care for the treated area?    Gently wash the area with your hands when bathing.    Use a thin layer of Vaselin to help with healing. You may use a Band-Aid.     The area should heal within 7-10 days.    Do not use an antibiotic or Neosporin ointment.     You may take acetaminophen (Tylenol) for pain.     Call your Doctor if you have:    Severe pain    Signs of infection (warmth, redness, cloudy yellow drainage, and or a bad smell)    Questions or concerns    Contact infromation:  [facility] [phone]  [facility] [phone]      Wound Care After a Biopsy    What is a skin biopsy?  A skin biopsy allows the doctor to examine a very small piece of tissue under the microscope to determine the diagnosis and the best treatment for the skin condition. A local anesthetic (numbing medicine)  is injected with a very small needle into the skin area to be tested. A small piece of skin is taken from the area. Sometimes a suture (stitch) is used.     What are the risks of a skin biopsy?  I will experience scar, bleeding, swelling, pain, crusting and redness. I may experience incomplete removal or recurrence. Risks of this procedure are excessive bleeding, bruising, infection, nerve damage, numbness, thick (hypertrophic or keloidal) scar and non-diagnostic biopsy.    How should I care for my wound for the first 24 hours?    Keep the wound dry and covered for 24 hours    If it bleeds, hold direct pressure on the area for 15 minutes. If bleeding does not stop then go to the emergency  room    Avoid strenuous exercise the first 1-2 days or as your doctor instructs you    How should I care for the wound after 24 hours?    After 24 hours, remove the bandage    You may bathe or shower as normal    If you had a scalp biopsy, you can shampoo as usual and can use shower water to clean the biopsy site daily    Clean the wound twice a day with gentle soap and water    Do not scrub, be gentle    Apply white petroleum/Vaseline after cleaning the wound with a cotton swab or a clean finger, and keep the site covered with a Bandaid /bandage. Bandages are not necessary with a scalp biopsy    If you are unable to cover the site with a Bandaid /bandage, re-apply ointment 2-3 times a day to keep the site moist. Moisture will help with healing    Avoid strenuous activity for first 1-2 days    Avoid lakes, rivers, pools, and oceans until the stitches are removed or the site is healed    How do I clean my wound?    Wash hands thoroughly with soap or use hand  before all wound care    Clean the wound with gentle soap and water    Apply white petroleum/Vaseline  to wound after it is clean    Replace the Bandaid /bandage to keep the wound covered for the first few days or as instructed by your doctor    If you had a scalp biopsy, warm shower water to the area on a daily basis should suffice    What should I use to clean my wound?     Cotton-tipped applicators (Qtips )    White petroleum jelly (Vaseline ). Use a clean new container and use Q-tips to apply.    Bandaids   as needed    Gentle soap     How should I care for my wound long term?    Do not get your wound dirty    Keep up with wound care for one week or until the area is healed.    A small scab will form and fall off by itself when the area is completely healed. The area will be red and will become pink in color as it heals. Sun protection is very important for how your scar will turn out. Sunscreen with an SPF 30 or greater is recommended once the area  is healed.    You should have some soreness but it should be mild and slowly go away over several days. Talk to your doctor about using tylenol for pain,    When should I call my doctor?  If you have increased:     Pain or swelling    Pus or drainage (clear or slightly yellow drainage is ok)    Temperature over 100F    Spreading redness or warmth around wound    When will I hear about my results?  The biopsy results can take 2-3 weeks to come back. The clinic will call you with the results, send you a [employer] message, or have you schedule a follow-up clinic or phone time to discuss the results. Contact our clinics if you do not hear from us in 3 weeks.     Who should I call with questions?    [facility]: [phone]     [facility]: [phone]    For urgent needs outside of business hours call the [facility] at [phone] and ask for the dermatology resident on call    Cryotherapy    What is it?    Use of a very cold liquid, such as liquid nitrogen, to freeze and destroy abnormal skin cells that need to be removed    What should I expect?    Tenderness and redness    A small blister that might grow and fill with dark purple blood. There may be crusting.    More than one treatment may be needed if the lesions do not go away.    How do I care for the treated area?    Gently wash the area with your hands when bathing.    Use a thin layer of Vaseline to help with healing. You may use a Band-Aid.     The area should heal within 7-10 days and may leave behind a pink or lighter color.     Do not use an antibiotic or Neosporin ointment.     You may take acetaminophen (Tylenol) for pain.     Call your Doctor if you have:    Severe pain    Signs of infection (warmth, redness, cloudy yellow drainage, and or a bad smell)    Questions or concerns    Who should I call with questions?       [facility]: [phone]        [facility]: [phone]       For urgent needs outside of business hours call the [facility] at [phone]        and ask for the dermatology resident on call

## 2018-05-10 NOTE — MR AVS SNAPSHOT
"              After Visit Summary   5/10/2018    Kumar Gonzalez    MRN: [id number]           Patient Information     Date Of Birth          1950        Visit Information        Provider Department      5/10/2018 9:00 AM BA ANCILLARY [facility]        Today's Diagnoses     Need for vaccination    -  1       Follow-ups after your visit        Your next 10 appointments already scheduled     May 10, 2018  9:45 AM CDT   Return Visit with Misty Staples MD   [facility]    [address] [place] [id number] [phone]              Who to contact     If you have questions or need follow up information about today's clinic visit or your schedule please contact [facility] directly at [phone].  Normal or non-critical lab and imaging results will be communicated to you by MyChart, letter or phone within 4 business days after the clinic has received the results. If you do not hear from us within 7 days, please contact the clinic through MyChart or phone. If you have a critical or abnormal lab result, we will notify you by phone as soon as possible.  Submit refill requests through [employer] or call your pharmacy and they will forward the refill request to us. Please allow 3 business days for your refill to be completed.          Additional Information About Your Visit        MyChart Information     [employer] lets you send messages to your doctor, view your test results, renew your prescriptions, schedule appointments and more. To sign up, go to [email or web address] . Click on \"Log in\" on the left side of the screen, which will take you to the Welcome page. Then click on \"Sign up Now\" on the right side of the page.     You will be asked to enter the access code listed below, as well as some personal information. Please follow the directions to create your username and password.     Your access code is: [email or web address]  Expires: " "2018 10:13 AM     Your access code will  in 90 days. If you need help or a new code, please call your [facility] or [phone].        Care EveryWhere ID     This is your Care EveryWhere ID. This could be used by other organizations to access your [place] medical records  [id number]        Your Vitals Were     Pulse Temperature Respirations Height BMI (Body Mass Index)       56 97.5  F (36.4  C) (Oral) 20 5' 7.5\" (1.715 m) 27.5 kg/m2        Blood Pressure from Last 3 Encounters:   18 130/80   17 108/60   17 120/80    Weight from Last 3 Encounters:   05/10/18 178 lb 3.2 oz (80.8 kg)   18 175 lb (79.4 kg)   17 187 lb (84.8 kg)              We Performed the Following     ADMIN 1st VACCINE     ZOSTER VACCINE RECOMBINANT ADJUVANTED IM NJX (SHINGRIX)        Primary Care Provider Office Phone # Fax #    Suzanne Torres [phone] [address]  [facility] [address]        Equal Access to Services     [facility]: Hadii valeri vail hadasho Soomaali, waaxda luqadaha, qaybta kaalmada adeegyada, raine parkinson. So [facility] [phone].    ATENCIÓN: Si habla español, tiene a alcantar disposición servicios gratuitos de asistencia lingüística. Llame al [phone].    We comply with applicable federal civil rights laws and [place] laws. We do not discriminate on the basis of race, color, national origin, age, disability, sex, sexual orientation, or gender identity.            Thank you!     Thank you for choosing [facility]  for your care. Our goal is always to provide you with excellent care. Hearing back from our patients is one way we can continue to improve our services. Please take a few minutes to complete the written survey that you may receive in the mail after your visit with us. Thank you!             Your Updated Medication List - Protect others around you: Learn how to safely use, store and " throw away your medicines at www.disposemymeds.org.          This list is accurate as of 5/10/18  9:37 AM.  Always use your most recent med list.                   Brand Name Dispense Instructions for use Diagnosis    CALCIUM + D PO      Take by mouth daily 1200 MG/1000 MG D3        metroNIDAZOLE 0.75 % cream    METROCREAM    45 g    Apply topically daily    Rosacea       order for DME     1 Device    Equipment being ordered: [id number], $242, , WALKING, BOOT, LG, SHORT PNEUM    Acute right ankle pain       prednisoLONE acetate 1 % ophthalmic susp    PRED FORTE          simvastatin 20 MG tablet    ZOCOR    15 tablet    Take 1 tablet (20 mg) by mouth At Bedtime Needs office visit prior to further refills    Hyperlipidemia LDL goal <130       VALTREX PO      Take 1,000 mg by mouth daily

## 2018-05-16 LAB — COPATH REPORT: NORMAL

## 2018-05-21 ENCOUNTER — TELEPHONE (OUTPATIENT)
Dept: DERMATOLOGY | Facility: CLINIC | Age: 68
End: 2018-05-21

## 2018-08-29 DIAGNOSIS — R25.2 LEG CRAMP: ICD-10-CM

## 2018-08-29 LAB
ANION GAP SERPL CALCULATED.3IONS-SCNC: 5 MMOL/L (ref 3–14)
BUN SERPL-MCNC: 22 MG/DL (ref 7–30)
CALCIUM SERPL-MCNC: 9.1 MG/DL (ref 8.5–10.1)
CHLORIDE SERPL-SCNC: 108 MMOL/L (ref 94–109)
CO2 SERPL-SCNC: 30 MMOL/L (ref 20–32)
CREAT SERPL-MCNC: 1.24 MG/DL (ref 0.66–1.25)
GFR SERPL CREATININE-BSD FRML MDRD: 58 ML/MIN/1.7M2
GLUCOSE SERPL-MCNC: 96 MG/DL (ref 70–99)
POTASSIUM SERPL-SCNC: 5 MMOL/L (ref 3.4–5.3)
SODIUM SERPL-SCNC: 143 MMOL/L (ref 133–144)

## 2018-08-29 PROCEDURE — 36415 COLL VENOUS BLD VENIPUNCTURE: CPT | Performed by: FAMILY MEDICINE

## 2018-08-29 PROCEDURE — 80048 BASIC METABOLIC PNL TOTAL CA: CPT | Performed by: FAMILY MEDICINE

## 2018-09-17 ENCOUNTER — OFFICE VISIT (OUTPATIENT)
Dept: FAMILY MEDICINE | Facility: CLINIC | Age: 68
End: 2018-09-17
Payer: COMMERCIAL

## 2018-09-17 VITALS
BODY MASS INDEX: 26.52 KG/M2 | OXYGEN SATURATION: 97 % | HEART RATE: 80 BPM | HEIGHT: 68 IN | SYSTOLIC BLOOD PRESSURE: 107 MMHG | WEIGHT: 175 LBS | TEMPERATURE: 97.7 F | DIASTOLIC BLOOD PRESSURE: 68 MMHG

## 2018-09-17 DIAGNOSIS — R53.83 FATIGUE, UNSPECIFIED TYPE: ICD-10-CM

## 2018-09-17 DIAGNOSIS — R06.09 DOE (DYSPNEA ON EXERTION): ICD-10-CM

## 2018-09-17 DIAGNOSIS — R42 ORTHOSTATIC DIZZINESS: ICD-10-CM

## 2018-09-17 DIAGNOSIS — I49.3 PVC'S (PREMATURE VENTRICULAR CONTRACTIONS): ICD-10-CM

## 2018-09-17 DIAGNOSIS — R35.1 BENIGN PROSTATIC HYPERPLASIA WITH NOCTURIA: ICD-10-CM

## 2018-09-17 DIAGNOSIS — Z82.3 FAMILY HISTORY OF STROKE (CEREBROVASCULAR): ICD-10-CM

## 2018-09-17 DIAGNOSIS — N40.1 BENIGN PROSTATIC HYPERPLASIA WITH NOCTURIA: ICD-10-CM

## 2018-09-17 DIAGNOSIS — Z23 NEED FOR ZOSTER VACCINATION: ICD-10-CM

## 2018-09-17 DIAGNOSIS — R35.1 NOCTURIA: ICD-10-CM

## 2018-09-17 DIAGNOSIS — Z01.818 PREOP GENERAL PHYSICAL EXAM: Primary | ICD-10-CM

## 2018-09-17 DIAGNOSIS — M25.561 RIGHT KNEE PAIN, UNSPECIFIED CHRONICITY: ICD-10-CM

## 2018-09-17 DIAGNOSIS — H54.7 DECREASED VISION: ICD-10-CM

## 2018-09-17 LAB
ALBUMIN UR-MCNC: NEGATIVE MG/DL
APPEARANCE UR: CLEAR
BASOPHILS # BLD AUTO: 0 10E9/L (ref 0–0.2)
BASOPHILS NFR BLD AUTO: 0.5 %
BILIRUB UR QL STRIP: NEGATIVE
COLOR UR AUTO: YELLOW
DIFFERENTIAL METHOD BLD: ABNORMAL
EOSINOPHIL # BLD AUTO: 0.2 10E9/L (ref 0–0.7)
EOSINOPHIL NFR BLD AUTO: 2.3 %
ERYTHROCYTE [DISTWIDTH] IN BLOOD BY AUTOMATED COUNT: 15.3 % (ref 10–15)
GLUCOSE UR STRIP-MCNC: NEGATIVE MG/DL
HCT VFR BLD AUTO: 44.2 % (ref 40–53)
HGB BLD-MCNC: 15.1 G/DL (ref 13.3–17.7)
HGB UR QL STRIP: NEGATIVE
KETONES UR STRIP-MCNC: ABNORMAL MG/DL
LEUKOCYTE ESTERASE UR QL STRIP: NEGATIVE
LYMPHOCYTES # BLD AUTO: 1.9 10E9/L (ref 0.8–5.3)
LYMPHOCYTES NFR BLD AUTO: 23.3 %
MCH RBC QN AUTO: 31.9 PG (ref 26.5–33)
MCHC RBC AUTO-ENTMCNC: 34.2 G/DL (ref 31.5–36.5)
MCV RBC AUTO: 93 FL (ref 78–100)
MONOCYTES # BLD AUTO: 0.5 10E9/L (ref 0–1.3)
MONOCYTES NFR BLD AUTO: 6.7 %
NEUTROPHILS # BLD AUTO: 5.3 10E9/L (ref 1.6–8.3)
NEUTROPHILS NFR BLD AUTO: 67.2 %
NITRATE UR QL: NEGATIVE
PH UR STRIP: 5.5 PH (ref 5–7)
PLATELET # BLD AUTO: 179 10E9/L (ref 150–450)
RBC # BLD AUTO: 4.74 10E12/L (ref 4.4–5.9)
SOURCE: ABNORMAL
SP GR UR STRIP: 1.02 (ref 1–1.03)
TSH SERPL DL<=0.005 MIU/L-ACNC: 1.25 MU/L (ref 0.4–4)
UROBILINOGEN UR STRIP-ACNC: 0.2 EU/DL (ref 0.2–1)
WBC # BLD AUTO: 7.9 10E9/L (ref 4–11)

## 2018-09-17 PROCEDURE — 85025 COMPLETE CBC W/AUTO DIFF WBC: CPT | Performed by: FAMILY MEDICINE

## 2018-09-17 PROCEDURE — 84443 ASSAY THYROID STIM HORMONE: CPT | Performed by: FAMILY MEDICINE

## 2018-09-17 PROCEDURE — 99215 OFFICE O/P EST HI 40 MIN: CPT | Mod: 25 | Performed by: FAMILY MEDICINE

## 2018-09-17 PROCEDURE — 87086 URINE CULTURE/COLONY COUNT: CPT | Performed by: FAMILY MEDICINE

## 2018-09-17 PROCEDURE — 93000 ELECTROCARDIOGRAM COMPLETE: CPT | Performed by: FAMILY MEDICINE

## 2018-09-17 PROCEDURE — 81003 URINALYSIS AUTO W/O SCOPE: CPT | Performed by: FAMILY MEDICINE

## 2018-09-17 PROCEDURE — 90750 HZV VACC RECOMBINANT IM: CPT | Performed by: FAMILY MEDICINE

## 2018-09-17 PROCEDURE — 90471 IMMUNIZATION ADMIN: CPT | Performed by: FAMILY MEDICINE

## 2018-09-17 PROCEDURE — 36415 COLL VENOUS BLD VENIPUNCTURE: CPT | Performed by: FAMILY MEDICINE

## 2018-09-17 RX ORDER — OFLOXACIN 3 MG/ML
SOLUTION/ DROPS OPHTHALMIC
COMMUNITY
Start: 2018-09-05 | End: 2019-02-20

## 2018-09-17 ASSESSMENT — PAIN SCALES - GENERAL: PAINLEVEL: NO PAIN (0)

## 2018-09-17 NOTE — LETTER
September 18, 2018      Kumar Carlos  [address] [facility] [address]        Dear Kumar,     It was a pleasure seeing you at your recent visit. Your labs have been reviewed and are attached.     Your labs look good. The blood count panel was within normal range. The thyroid and urine testing was in the normal range too.     Please continue with the plan we developed in the office.         Sincerely,        Suzanne Torres MD        Results for orders placed or performed in visit on 09/17/18   CBC with platelets differential   Result Value Ref Range    WBC 7.9 4.0 - 11.0 10e9/L    RBC Count 4.74 4.4 - 5.9 10e12/L    Hemoglobin 15.1 13.3 - 17.7 g/dL    Hematocrit 44.2 40.0 - 53.0 %    MCV 93 78 - 100 fl    MCH 31.9 26.5 - 33.0 pg    MCHC 34.2 31.5 - 36.5 g/dL    RDW 15.3 (H) 10.0 - 15.0 %    Platelet Count 179 150 - 450 10e9/L    % Neutrophils 67.2 %    % Lymphocytes 23.3 %    % Monocytes 6.7 %    % Eosinophils 2.3 %    % Basophils 0.5 %    Absolute Neutrophil 5.3 1.6 - 8.3 10e9/L    Absolute Lymphocytes 1.9 0.8 - 5.3 10e9/L    Absolute Monocytes 0.5 0.0 - 1.3 10e9/L    Absolute Eosinophils 0.2 0.0 - 0.7 10e9/L    Absolute Basophils 0.0 0.0 - 0.2 10e9/L    Diff Method Automated Method    TSH with free T4 reflex   Result Value Ref Range    TSH 1.25 0.40 - 4.00 mU/L   UA reflex to Microscopic and Culture   Result Value Ref Range    Color Urine Yellow     Appearance Urine Clear     Glucose Urine Negative NEG^Negative mg/dL    Bilirubin Urine Negative NEG^Negative    Ketones Urine Trace (A) NEG^Negative mg/dL    Specific Gravity Urine 1.025 1.003 - 1.035    Blood Urine Negative NEG^Negative    pH Urine 5.5 5.0 - 7.0 pH    Protein Albumin Urine Negative NEG^Negative mg/dL    Urobilinogen Urine 0.2 0.2 - 1.0 EU/dL    Nitrite Urine Negative NEG^Negative    Leukocyte Esterase Urine Negative NEG^Negative    Source Midstream Urine    EKG 12-lead complete w/read - Clinics    Impression    sinus rhythm  w/ rate variation, frequent pvc's , normal axis, normal intervals, no acute ST/T changes c/w ischemia, no LVH by voltage criteria, unchanged from previous tracings   Urine Culture Aerobic Bacterial   Result Value Ref Range    Specimen Description Midstream Urine     Culture Micro No growth

## 2018-09-17 NOTE — NURSING NOTE
Screening Questionnaire for Adult Immunization    Are you sick today?   No   Do you have allergies to medications, food, a vaccine component or latex?   No   Have you ever had a serious reaction after receiving a vaccination?   Reaction to flu shot, no problem with shingles vaccins   Do you have a long-term health problem with heart disease, lung disease, asthma, kidney disease, metabolic disease (e.g. diabetes), anemia, or other blood disorder?   No   Do you have cancer, leukemia, HIV/AIDS, or any other immune system problem?   No   In the past 3 months, have you taken medications that affect  your immune system, such as prednisone, other steroids, or anticancer drugs; drugs for the treatment of rheumatoid arthritis, Crohn s disease, or psoriasis; or have you had radiation treatments?   No   Have you had a seizure, or a brain or other nervous system problem?   No   During the past year, have you received a transfusion of blood or blood     products, or been given immune (gamma) globulin or antiviral drug?   No   For women: Are you pregnant or is there a chance you could become        pregnant during the next month?   No   Have you received any vaccinations in the past 4 weeks?   No     Immunization questionnaire was positive for at least one answer.        Patient instructed to remain in clinic for 15 minutes afterwards, and to report any adverse reaction to me immediately.       Screening performed by Destiny Foster on 9/17/2018 at 11:58 AM.

## 2018-09-17 NOTE — MR AVS SNAPSHOT
After Visit Summary   9/17/2018    Kumar Gonzalez    MRN: [id number]           Patient Information     Date Of Birth          1950        Visit Information        Provider Department      9/17/2018 10:40 AM Suzanne Torres MD [facility]        Today's Diagnoses     Preop general physical exam    -  1    Decreased vision        Fatigue, unspecified type        Benign prostatic hyperplasia with nocturia        Family history of stroke (cerebrovascular)        Orthostatic dizziness        LILLY (dyspnea on exertion)        Right knee pain, unspecified chronicity        PVC's (premature ventricular contractions)        Nocturia        Need for zoster vaccination          Care Instructions    The morning of your procedure you can take your Valtrex and simvastatin with a small sip of water. Hold off on all your supplements, like calcium, that morning.     Try to decrease your coffee intake and increase water intake. Drink more water in the morning and decrease water throughout the day so that you won't have to get up so much at night.     Please call [facility] (formerly called [facility]) at [phone] to schedule your ultrasound and stress test.     At [facility], we strive to deliver an exceptional experience to you, every time we see you.  If you receive a survey in the mail, please send us back your thoughts. We really do value your feedback.    Suggested websites for health information:  Www.Slack.org : Up to date and easily searchable information on multiple topics.  Www.medlineplus.gov : medication info, interactive tutorials, watch real surgeries online  Www.familydoctor.org : good info from the Academy of Family Physicians  Www.cdc.gov : public health info, travel advisories, epidemics (H1N1)  Www.aap.org : children's health info, normal development, vaccinations  Www.health.state.[place].us : [place] dept of  health, public health issues in [place], N1N1    Your care team:     Family Medicine   GALLITO Ivory MD Emily Bunt, APRN CNP   S. MD Radha Wynne MD Angela Wermerskirchen, MD         Clinic hours: Monday - Wednesday 7 am-7 pm   Thursdays and Fridays 7 am-5 pm.     [facility]: Monday - Friday 11 am-9 pm,   Saturday and Sunday 9 am-5 pm.    [facility]: Monday -Thursday 8 am-8 pm; Friday 8 am-6 pm; Saturday and Sunday 9 am-5 pm.     [facility]: Monday - Thursday 8 am - 7 pm; Friday 8 am - 6 pm    Clinic: [phone]   [facility]: [phone]   [facility]: [phone]            Before Your Surgery      Call your surgeon if there is any change in your health. This includes signs of a cold or flu (such as a sore throat, runny nose, cough, rash or fever).    Do not smoke, drink alcohol or take over the counter medicine (unless your surgeon or primary care doctor tells you to) for the 24 hours before and after surgery.    If you take prescribed drugs: Follow your doctor s orders about which medicines to take and which to stop until after surgery.    Eating and drinking prior to surgery: follow the instructions from your surgeon    Take a shower or bath the night before surgery. Use the soap your surgeon gave you to gently clean your skin. If you do not have soap from your surgeon, use your regular soap. Do not shave or scrub the surgery site.  Wear clean pajamas and have clean sheets on your bed.           Follow-ups after your visit        Additional Services     ORTHO  REFERRAL       [facility] is referring you to the Orthopedic  Services at [facility].       The  Representative will assist you in the coordination of your Orthopedic and Musculoskeletal Care as prescribed by your physician.    The   Representative will call you within 1 business day to help schedule your appointment, or you may contact the [facility] Representative at:    All areas ~ [phone]     Type of Referral : Non Surgical       Timeframe requested: Routine    Coverage of these services is subject to the terms and limitations of your health insurance plan.  Please call member services at your health plan with any benefit or coverage questions.      If X-rays, CT or MRI's have been performed, please contact the facility where they were done to arrange for , prior to your scheduled appointment.  Please bring this referral request to your appointment and present it to your specialist.                  Your next 10 appointments already scheduled     Sep 25, 2018  8:30 AM CDT   Return Visit with Misty Staples MD   [facility] ([facility]    [address] [place] [id number] [phone]              Future tests that were ordered for you today     Open Future Orders        Priority Expected Expires Ordered    US Carotid Bilateral Routine  9/17/2019 9/17/2018    Exercise Stress Echocardiogram Routine  9/17/2019 9/17/2018            Who to contact     If you have questions or need follow up information about today's clinic visit or your schedule please contact [facility] directly at [phone].  Normal or non-critical lab and imaging results will be communicated to you by MyChart, letter or phone within 4 business days after the clinic has received the results. If you do not hear from us within 7 days, please contact the clinic through MyChart or phone. If you have a critical or abnormal lab result, we will notify you by phone as soon as possible.  Submit refill requests through [employer] or call your pharmacy and they will forward the refill request to us. Please allow 3 business days for your refill to be completed.          Additional Information About Your Visit    "     MyChart Information     [employer] lets you send messages to your doctor, view your test results, renew your prescriptions, schedule appointments and more. To sign up, go to [email or web address] . Click on \"Log in\" on the left side of the screen, which will take you to the Welcome page. Then click on \"Sign up Now\" on the right side of the page.     You will be asked to enter the access code listed below, as well as some personal information. Please follow the directions to create your username and password.     Your access code is: [email or web address]  Expires: 2018 12:15 PM     Your access code will  in 90 days. If you need help or a new code, please call your [facility] clinic or [phone].        Care EveryWhere ID     This is your Care EveryWhere ID. This could be used by other organizations to access your [facility] medical records  [id number]        Your Vitals Were     Pulse Temperature Height Pulse Oximetry BMI (Body Mass Index)       80 97.7  F (36.5  C) (Oral) 1.715 m (5' 7.5\") 97% 27 kg/m2        Blood Pressure from Last 3 Encounters:   18 107/68   18 130/80   17 108/60    Weight from Last 3 Encounters:   18 79.4 kg (175 lb)   05/10/18 80.8 kg (178 lb 3.2 oz)   18 79.4 kg (175 lb)              We Performed the Following     CBC with platelets differential     EKG 12-lead complete w/read - Clinics     EKG 12-lead complete w/read - Clinics     ORTHO  REFERRAL     TSH with free T4 reflex     UA reflex to Microscopic and Culture     Urine Culture Aerobic Bacterial     ZOSTER VACCINE RECOMBINANT ADJUVANTED IM NJX        Primary Care Provider Office Phone # Fax #    Suzanne Torres [phone] [address]  [facility] [address]        Equal Access to Services     MIROSLAVA EM AH: Javy Alex, rosalva mae, raine quachaan ah. So [facility] " [phone].    ATENCIÓN: Si sid english, tiene a alcantar disposición servicios gratuitos de asistencia lingüística. Jose ga [phone].    We comply with applicable federal civil rights laws and [place] laws. We do not discriminate on the basis of race, color, national origin, age, disability, sex, sexual orientation, or gender identity.            Thank you!     Thank you for choosing [facility]  for your care. Our goal is always to provide you with excellent care. Hearing back from our patients is one way we can continue to improve our services. Please take a few minutes to complete the written survey that you may receive in the mail after your visit with us. Thank you!             Your Updated Medication List - Protect others around you: Learn how to safely use, store and throw away your medicines at www.disposemymeds.org.          This list is accurate as of 9/17/18 12:15 PM.  Always use your most recent med list.                   Brand Name Dispense Instructions for use Diagnosis    CALCIUM + D PO      Take by mouth daily 1200 MG/1000 MG D3        metroNIDAZOLE 0.75 % cream    METROCREAM    45 g    Apply topically daily    Rosacea       ofloxacin 0.3 % ophthalmic solution    OCUFLOX          prednisoLONE acetate 1 % ophthalmic susp    PRED FORTE          simvastatin 20 MG tablet    ZOCOR    90 tablet    TAKE ONE TABLET BY MOUTH AT BEDTIME    Hyperlipidemia LDL goal <130       VALTREX PO      Take 1,000 mg by mouth 2 times daily

## 2018-09-17 NOTE — PROGRESS NOTES
[address] [address]  [phone]  Dept: [phone]    PRE-OP EVALUATION:  Today's date: 2018    Kumar Gonzalez (: 1950) presents for pre-operative evaluation assessment as requested by Dr. Oleary.  He requires evaluation and anesthesia risk assessment prior to undergoing surgery/procedure for treatment of left eye .    Proposed Surgery/ Procedure: Cornea Transplant left eye  Date of Surgery/ Procedure: 10/4/18  Time of Surgery/ Procedure: [facility]  Hospital/Surgical Facility: [facility]  Fax number for surgical facility: [phone]  Primary Physician: Suzanne Torres  Type of Anesthesia Anticipated: unsure    Patient has a Health Care Directive or Living Will:  NO    1. NO - Do you have a history of heart attack, stroke, stent, bypass or surgery on an artery in the head, neck, heart or legs?  2. NO - Do you ever have any pain or discomfort in your chest?  3. NO - Do you have a history of  Heart Failure?  4. NO - Are you troubled by shortness of breath when: walking on the level, up a slight hill or at night?  5. NO - Do you currently have a cold, bronchitis or other respiratory infection?  6. NO - Do you have a cough, shortness of breath or wheezing?  7. NO - Do you sometimes get pains in the calves of your legs when you walk?  8. NO - Do you or anyone in your family have previous history of blood clots?  9. NO - Do you or does anyone in your family have a serious bleeding problem such as prolonged bleeding following surgeries or cuts?  10. NO - Have you ever had problems with anemia or been told to take iron pills?  11. NO - Have you had any abnormal blood loss such as black, tarry or bloody stools, or abnormal vaginal bleeding?  12. NO - Have you ever had a blood transfusion?  13. NO - Have you or any of your relatives ever had problems with anesthesia?  14. NO - Do you have sleep apnea, excessive snoring or daytime  drowsiness?  15. NO - Do you have any prosthetic heart valves?  16. NO - Do you have prosthetic joints?  17. NO - Is there any chance that you may be pregnant?    HPI:     HPI related to upcoming procedure: Patient presents for evaluation prior to eye operation for diminished vision in left eye. Patient describes seeing some darkness but very little else.   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 Around that time patient also had retina attachment and iris repair. Patient will not need general anesthesia for procedure.       See problem list for active medical problems.  Problems all longstanding and stable, except as noted/documented.  See ROS for pertinent symptoms related to these conditions.                                                                                                                                                            MEDICAL HISTORY:     Patient Active Problem List    Diagnosis Date Noted     Insomnia 05/07/2014     Priority: Medium     Solar lentigo 08/27/2013     Priority: Medium     Anesthesia complication 02/20/2013     Priority: Medium     Seizure after eyelid surgery in 2008.   Neuro w/u suggestive of medications used. See letter from Dr. Calderon 2009  Anesthesia since incident with no complications       AK (actinic keratosis) 09/24/2012     Priority: Medium     KP (keratosis pilaris) 07/20/2012     Priority: Medium     Vitamin D deficiency 01/05/2012     Priority: Medium     Mild, noted 2011 and 2012       Advanced directives, counseling/discussion 01/02/2012     Priority: Medium     Advance Directive Problem List Overview:   Name Relationship Phone    Primary Health Care Agent            Alternative Health Care Agent          Discussed advance care planning with patient; however, patient declined at this time. 1/2/2012          History of actinic keratosis 12/07/2011     Priority: Medium     Xerosis cutis 12/07/2011     Priority: Medium     Lentigo 02/02/2011      Priority: Medium     Family history of melanoma 02/02/2011     Priority: Medium     Osteoporosis 01/19/2011     Priority: Medium     Spine, dexa 12/2010         Rosacea 12/02/2010     Priority: Medium     Colon polyp 12/01/2010     Priority: Medium     BPH (benign prostatic hyperplasia) 12/01/2010     Priority: Medium     Loss of height 12/01/2010     Priority: Medium     Erectile dysfunction 12/01/2010     Priority: Medium     Impacted cerumen 12/01/2010     Priority: Medium     HYPERLIPIDEMIA LDL GOAL <130 10/31/2010     Priority: Medium     Alopecia 03/24/2008     Priority: Medium     Problem list name updated by automated process. Provider to review       Family history of stroke (cerebrovascular) 03/24/2008     Priority: Medium     Carotids stenosis: younger brother, all of uncles (3 ), dad  Check carotid u/s annual        Past Medical History:   Diagnosis Date     Actinic keratosis      ALOPECIA NOS 3/24/2008     BPH (benign prostatic hyperplasia) 12/1/2010     Erectile dysfunction 12/1/2010     Hyperlipidemia LDL goal <130 10/31/2010     Seizure (H) 10/28/2008    after general anesthesia     Vitamin D deficiency 1/5/2012    Mild, noted 2011 and 2012     Past Surgical History:   Procedure Laterality Date     C APPENDECTOMY  8th Grade     CA ANESTH,CORNEAL TRANSPLANT  2008?     EYE SURGERY      iris surgery     HC PNEUMATIC RETINOPEXY  Multiple    Bilateral     Current Outpatient Prescriptions   Medication Sig Dispense Refill     Calcium Carbonate-Vitamin D (CALCIUM + D PO) Take by mouth daily 1200 MG/1000 MG D3       metroNIDAZOLE (METROCREAM) 0.75 % cream Apply topically daily 45 g 11     ofloxacin (OCUFLOX) 0.3 % ophthalmic solution        prednisoLONE acetate (PRED FORTE) 1 % ophthalmic suspension        simvastatin (ZOCOR) 20 MG tablet TAKE ONE TABLET BY MOUTH AT BEDTIME  90 tablet 3     ValACYclovir HCl (VALTREX PO) Take 1,000 mg by mouth 2 times daily        OTC products: None, except as noted  above    No Known Allergies   Latex Allergy: NO    Social History   Substance Use Topics     Smoking status: Never Smoker     Smokeless tobacco: Never Used     Alcohol use Yes      Comment: occasionaly     History   Drug Use No       REVIEW OF SYSTEMS:   Review of Systems:  CONSTITUTIONAL: Complains of general fatigue onset sometime this summer, unsure exactly when. NEGATIVE for fever, chills, change in weight  INTEGUMENTARY/SKIN: Concerned about multiple skin spots (no rash), but has a dermatology appointment scheduled for 09/25   EYES: Difficulty seeing out of left eye as in hpi  ENT/MOUTH: NEGATIVE for ear, mouth and throat problems  RESP: Felt somewhat out of breath while golfing and climbing hills this summer but attributes this to not exercising much. Has not been taking the stairs so is unsure about LILLY with that. Denies difficulty doing chores around the house    BREAST: NEGATIVE for masses, tenderness or discharge  CV: NEGATIVE for chest pain, palpitations or peripheral edema. Unsure when last carotid ultrasound was   GI: NEGATIVE for nausea, abdominal pain, heartburn, or change in bowel habits  : Complaining of getting up to urinate 2-3 times nightly. No dysuria, frequency  MUSCULOSKELETAL: Right knee pain onset 3 weeks ago after twisting it while golfing. Still able to bike ride but certain movements like squatting are painful. Intermittent hamstring pain onset 4 years ago, denies that the pain is associated with intensity of exercise   NEURO: Feels lightheaded after bending over and then standing up. These episodes typically resolve within a couple seconds   ENDOCRINE: NEGATIVE for temperature intolerance, skin/hair changes  HEME/ALLERGY: NEGATIVE for bleeding problems  PSYCHIATRIC: NEGATIVE for changes in mood or affect      This document serves as a record of the services and decisions personally performed by KATY HATCH. It was created on his/her behalf by Hugo Vick, a trained  "medical scribe. The creation of this document is based on the provider's statements to the medical scribe. Hugo Vick, September 17, 2018 11:14 AM  EXAM:   /68 (BP Location: Right arm, Patient Position: Chair, Cuff Size: Adult Regular)  Pulse 80  Temp 97.7  F (36.5  C) (Oral)  Ht 1.715 m (5' 7.5\")  Wt 79.4 kg (175 lb)  SpO2 97%  BMI 27 kg/m2    GENERAL APPEARANCE: healthy, alert and no distress     EYES: EOMI,  Pupil on left eye somewhat irregular and does not react to light.      HENT: Left cerumen impaction. nose and mouth without ulcers or lesions     NECK: no adenopathy, no asymmetry, masses, or scars and thyroid normal to palpation     RESP: lungs clear to auscultation - no rales, rhonchi or wheezes     CV: frequent ectopy, normal S1 S2, no S3 or S4 and no murmur, click or rub     ABDOMEN:  soft, nontender, no HSM or masses and bowel sounds normal     MS: extremities normal- no gross deformities noted, no evidence of inflammation in joints, FROM in all extremities.     SKIN: no suspicious lesions or rashes     NEURO: Normal strength and tone, sensory exam grossly normal, mentation intact and speech normal     PSYCH: mentation appears normal. and affect normal/bright     LYMPHATICS: No cervical adenopathy     RECTAL (male): normal sphincter tone, no rectal masses, 1-2+ prostate, smooth, nontender without nodules or masses    DIAGNOSTICS:       Recent Labs   Lab Test  08/29/18   0733  05/04/18   0658  01/18/17   1711   06/04/15   0726  06/19/14   0820   HGB   --    --   14.6   --   15.2  14.5   PLT   --    --    --    --   157  115*   NA  143  143   --    < >   --   140   POTASSIUM  5.0  4.8   --    < >   --   3.9   CR  1.24  1.26*   --    < >   --   1.09    < > = values in this interval not displayed.     GFR Estimate   Date Value Ref Range Status   08/29/2018 58 (L) >60 mL/min/1.7m2 Final     Comment:     Non  GFR Calc   05/04/2018 57 (L) >60 mL/min/1.7m2 Final     Comment:     " Non  GFR Calc   08/25/2016 61 >60 mL/min/1.7m2 Final     Comment:     Non  GFR Calc     GFR Estimate If Black   Date Value Ref Range Status   08/29/2018 70 >60 mL/min/1.7m2 Final     Comment:      GFR Calc   05/04/2018 69 >60 mL/min/1.7m2 Final     Comment:      GFR Calc   08/25/2016 74 >60 mL/min/1.7m2 Final     Comment:      GFR Calc     EKG - sinus rhythm w/ rate variation, frequent pvc's/pac's , normal axis, normal intervals, no acute ST/T changes c/w ischemia, no LVH by voltage criteria, unchanged from previous tracings    Rhythm strip: frequent pvc/pac's, occasionally in 2:1 or 3:1 pattern.  Results for orders placed or performed in visit on 09/17/18   CBC with platelets differential   Result Value Ref Range    WBC 7.9 4.0 - 11.0 10e9/L    RBC Count 4.74 4.4 - 5.9 10e12/L    Hemoglobin 15.1 13.3 - 17.7 g/dL    Hematocrit 44.2 40.0 - 53.0 %    MCV 93 78 - 100 fl    MCH 31.9 26.5 - 33.0 pg    MCHC 34.2 31.5 - 36.5 g/dL    RDW 15.3 (H) 10.0 - 15.0 %    Platelet Count 179 150 - 450 10e9/L    % Neutrophils 67.2 %    % Lymphocytes 23.3 %    % Monocytes 6.7 %    % Eosinophils 2.3 %    % Basophils 0.5 %    Absolute Neutrophil 5.3 1.6 - 8.3 10e9/L    Absolute Lymphocytes 1.9 0.8 - 5.3 10e9/L    Absolute Monocytes 0.5 0.0 - 1.3 10e9/L    Absolute Eosinophils 0.2 0.0 - 0.7 10e9/L    Absolute Basophils 0.0 0.0 - 0.2 10e9/L    Diff Method Automated Method    TSH with free T4 reflex   Result Value Ref Range    TSH 1.25 0.40 - 4.00 mU/L   UA reflex to Microscopic and Culture   Result Value Ref Range    Color Urine Yellow     Appearance Urine Clear     Glucose Urine Negative NEG^Negative mg/dL    Bilirubin Urine Negative NEG^Negative    Ketones Urine Trace (A) NEG^Negative mg/dL    Specific Gravity Urine 1.025 1.003 - 1.035    Blood Urine Negative NEG^Negative    pH Urine 5.5 5.0 - 7.0 pH    Protein Albumin Urine Negative NEG^Negative mg/dL     Urobilinogen Urine 0.2 0.2 - 1.0 EU/dL    Nitrite Urine Negative NEG^Negative    Leukocyte Esterase Urine Negative NEG^Negative    Source Midstream Urine    EKG 12-lead complete w/read - Clinics    Impression    sinus rhythm w/ rate variation, frequent pvc's , normal axis, normal intervals, no acute ST/T changes c/w ischemia, no LVH by voltage criteria, unchanged from previous tracings   Urine Culture Aerobic Bacterial   Result Value Ref Range    Specimen Description Midstream Urine     Culture Micro No growth      Last Comprehensive Metabolic Panel:  Sodium   Date Value Ref Range Status   08/29/2018 143 133 - 144 mmol/L Final     Potassium   Date Value Ref Range Status   08/29/2018 5.0 3.4 - 5.3 mmol/L Final     Chloride   Date Value Ref Range Status   08/29/2018 108 94 - 109 mmol/L Final     Carbon Dioxide   Date Value Ref Range Status   08/29/2018 30 20 - 32 mmol/L Final     Anion Gap   Date Value Ref Range Status   08/29/2018 5 3 - 14 mmol/L Final     Glucose   Date Value Ref Range Status   08/29/2018 96 70 - 99 mg/dL Final     Comment:     Fasting specimen     Urea Nitrogen   Date Value Ref Range Status   08/29/2018 22 7 - 30 mg/dL Final     Creatinine   Date Value Ref Range Status   08/29/2018 1.24 0.66 - 1.25 mg/dL Final     GFR Estimate   Date Value Ref Range Status   08/29/2018 58 (L) >60 mL/min/1.7m2 Final     Comment:     Non  GFR Calc     Calcium   Date Value Ref Range Status   08/29/2018 9.1 8.5 - 10.1 mg/dL Final        IMPRESSION:   Reason for surgery/procedure: diminished vision in left eye     The proposed surgical procedure is considered LOW risk.    REVISED CARDIAC RISK INDEX  The patient has the following serious cardiovascular risks for perioperative complications such as (MI, PE, VFib and 3  AV Block):  No serious cardiac risks  INTERPRETATION: 0 risks: Class I (very low risk - 0.4% complication rate)    The patient has the following additional risks for perioperative  complications:  No identified additional risks      ICD-10-CM    1. Preop general physical exam Z01.818 EKG 12-lead complete w/read - Clinics   2. Decreased vision H54.7    3. Fatigue, unspecified type R53.83 CBC with platelets differential     TSH with free T4 reflex   4. Benign prostatic hyperplasia with nocturia N40.1     R35.1    5. Family history of stroke (cerebrovascular) Z82.3 US Carotid Bilateral   6. Orthostatic dizziness R42 US Carotid Bilateral     Exercise Stress Echocardiogram   7. LILLY (dyspnea on exertion) R06.09 US Carotid Bilateral     Exercise Stress Echocardiogram   8. Right knee pain, unspecified chronicity M25.561 ORTHO  REFERRAL   9. PVC's (premature ventricular contractions) I49.3 Exercise Stress Echocardiogram     EKG 12-lead complete w/read - Clinics   10. Nocturia R35.1 UA reflex to Microscopic and Culture     Urine Culture Aerobic Bacterial   11. Need for zoster vaccination Z23 ZOSTER VACCINE RECOMBINANT ADJUVANTED IM NJX       RECOMMENDATIONS:     Given frequent premature beats and new dyspnea on exertion (could be from deconditioning) will get stress ECHO. This does not need to be completed prior to surgery as low risk surgery and no symptoms with 4 mets. Will also get carotid us due to strong fam hx of carotid disease.      --Patient is to take all scheduled medications on the day of surgery EXCEPT for modifications listed below.  -Hold calcium supplement     APPROVAL GIVEN to proceed with proposed procedure, without further diagnostic evaluation    Patient offered influenza vaccination and declined. Risks of not vaccinating discussed.     Patient Instructions     The morning of your procedure you can take your Valtrex and simvastatin with a small sip of water. Hold off on all your supplements, like calcium, that morning.     Try to decrease your coffee intake and increase water intake. Drink more water in the morning and decrease water throughout the day so that you won't have  to get up so much at night.     Please call [facility] (formerly [facility]) at [phone] to schedule your ultrasound and stress test.     At [facility], we strive to deliver an exceptional experience to you, every time we see you.  If you receive a survey in the mail, please send us back your thoughts. We really do value your feedback.    Suggested websites for health information:  Www.[employer].org : Up to date and easily searchable information on multiple topics.  Www.medlineplus.gov : medication info, interactive tutorials, watch real surgeries online  Www.familydoctor.org : good info from the Academy of Family Physicians  Www.cdc.gov : public health info, travel advisories, epidemics (H1N1)  Www.aap.org : children's health info, normal development, vaccinations  Www.health.[facility].[place].us : [place] dept of health, public health issues in [place], N1N1    Your care team:     Family Medicine   GALLITO Ivory MD Emily Bunt, APRN [facility]   S. MD Radha Wynne MD Angela Wermerskirchen, MD         Clinic hours: Monday - Wednesday 7 am-7 pm   Thursdays and Fridays 7 am-5 pm.     [facility]: Monday - Friday 11 am-9 pm,   Saturday and Sunday 9 am-5 pm.    [facility]: Monday -Thursday 8 am-8 pm; Friday 8 am-6 pm; Saturday and Sunday 9 am-5 pm.     [facility]: Monday - Thursday 8 am - 7 pm; Friday 8 am - 6 pm    Clinic: [phone]   [facility]: [phone]   [facility]: [phone]            Before Your Surgery      Call your surgeon if there is any change in your health. This includes signs of a cold or flu (such as a sore throat, runny nose, cough, rash or fever).    Do not smoke, drink alcohol or take over the counter medicine (unless your surgeon or primary care doctor tells you to) for the 24 hours before and after  surgery.    If you take prescribed drugs: Follow your doctor s orders about which medicines to take and which to stop until after surgery.    Eating and drinking prior to surgery: follow the instructions from your surgeon    Take a shower or bath the night before surgery. Use the soap your surgeon gave you to gently clean your skin. If you do not have soap from your surgeon, use your regular soap. Do not shave or scrub the surgery site.  Wear clean pajamas and have clean sheets on your bed.        The information in this document, created by the medical scribe for me, accurately reflects the services I personally performed and the decisions made by me. I have reviewed and approved this document for accuracy.   Signed Electronically by: Suzanne Torres MD    Copy of this evaluation report is provided to requesting physician.    [place] Preop Guidelines    Revised Cardiac Risk Index      ADDENDUM:  Patient was normal stress echo and no significant carotid stenosis. No concerns for upcoming surgery.       ECHO STRESS WITH OPTISON   Order: [id number]   Status:  Edited Result - FINAL   Visible to patient:  No (Not Released)   Next appt:  10/03/2018 at 07:00 AM in Orthopedics and Sports Medicine (Dagoberto Plaza DO)   Dx:  PVC's (premature ventricular contract...   Notes Recorded by Suzanne Torres MD on 2018 at 2:27 PM  Please update patient that his stress test was completely normal. This is reassuring his recent symptoms are not likely from his heart.      Details   Reading Physician Reading Date Result Priority   Michelle Wilson MD 2018    Narrative         [id number]  [facility]  [id number]  [id number]^MAMIE^SUZANNE^FAM           [facility]  Echocardiography Laboratory  [address] [place] [address]        Name: MERVAT CARVER  MRN: [id number]  : 1950  Study Date: 2018 09:52 AM  Age: 68 yrs  Gender: Male  Patient Location:  [facility]  Reason For Study: Orthostatic dizziness, LILLY (dyspnea on exertion), PVC's  (mark anthony  Ordering Physician: KATY HATCH  Referring Physician: KATY HATCH  Performed By: Carmella Dominguez [facility]     BSA: 1.9 m2  Height: 68 in  Weight: 175 lb  HR: 70  BP: 105/74 mmHg  _____________________________________________________________________________  __        Procedure  Stress Echo Bike with two dimensional, color and spectral Doppler performed.  _____________________________________________________________________________  __        Interpretation Summary     Negative dobutamine stress echocardiogram.  No regional wall motion abnormalities at rest and with infusion of dobutamine.  Normal LV size and function at rest. The LVEF is 55-60% and increases  appropriately to 70-75% with dobutamine infusion.  Normal blood pressure response to dobutamine infusion.  No ECG evidence of ischemia at rest and with infusion of dobutamine.  No subjective evidence of ischemia at rest and with infusion of dobutamine.  No significant valvular disease noted on routine screening color flow Doppler  and pulsed Doppler examination. The ascending aortic size appears normal.  _____________________________________________________________________________  __     Stress  Limiting Symptom: fatigue.  RPP 66391.  Maximum workload 100 milian.  Target Heart Rate was achieved.  Exercise was stopped due to fatigue.  The patient did not exhibit any symptoms during exercise.  Normal blood pressure response with stress.  Normal heart rate response to exercise.     Stress Results        Protocol:  Bike Stress Echo        Maximum Predicted HR:   153 bpm        Target HR: 130 bpm                 % Maximum Predicted HR: 90 %                             StageDurationHeart Rate  BP                                (mm:ss)   (bpm)                           REST  0:00      70    105/74                           PEAK  4:41      137    154/83                        Stress Duration:   4:41 mm:ss *                  Maximum Stress HR: 137 bpm *           METS: 4     Contrast  Optison (NDC #1461-1298-57) given intravenously. Patient was given 3.0 ml  mixture of 3 ml Optison and 6 ml saline. 6.0 ml wasted. IV start location  LAC .  _____________________________________________________________________________  __           Doppler Measurements & Calculations  MV E max dimas: 86.4 cm/sec  MV A max dimas: 86.9 cm/sec  MV E/A: 0.99  MV dec time: 0.18 sec  PA acc time: 0.13 sec  TR max dimas: 317.4 cm/sec  TR max P.1 mmHg  E/E' av.7  Lateral E/e': 10.3     Medial E/e': 17.0           _____________________________________________________________________________  __           Report approved by: Anni Young 2018 02:04 PM         Specimen Collected: 18  9:52 AM   Last Resulted: 18  9:52 AM              EXAMINATION: US CAROTID BILATERAL, 2018 7:32 AM      COMPARISON: 2013     HISTORY: Family history of stroke     TECHNIQUE:  Grayscale (B-mode) and duplex and spectral Doppler  ultrasound of the extracranial internal carotid, external carotid,  vertebral artery origins, right brachiocephalic/subclavian and left  subclavian arteries. Velocity measurements obtained with angle  correction at or less than 60 degrees.     FINDINGS:     Right side:      Plaque Morphology: Small amount plaque in the right carotid bulb.       Mid CCA: 143/33 cm/sec    External CA: 162/25 cm/sec        Proximal ICA: 82/16 cm/sec     Mid ICA: 82/16 cm/sec     Distal ICA: 84/29 cm/sec        Vertebral Artery: antegrade      ICA/CCA ratio: 0.88      Left side:      Plaque Morphology: Small amount of calcified plaque in the left  carotid bulb..        Mid CCA: 122/29 cm/sec     External CA: 113/9 cm/sec        Proximal ICA: 82/28 cm/sec     Mid ICA: 109/31 cm/sec     Distal ICA: 121/37 cm/sec        Vertebral Artery: antegrade,     ICA/CCA ratio:  0.99          Impression:  1. Right side:      Degree of stenosis: Less than 50 %. Small amount of calcified  plaque in the carotid bulb..     2. Left side:       Degree of stenosis: Less than 50 %. Small amount of calcified  plaque in the carotid bulb.              Consensus Panel Gray-Scale and Doppler US Criteria for Diagnosis of  ICA Stenosis (Radiology 11/2003) additionally modified by Cain et  al. in Journal of Vascular Surgery 1/2011, (10)32-38.        Normal         ICA PSV < 140 cm/sec       Plaque Estimate None       ICA/CCA  PSV Ratio < 2.0       ICA EDV < 40 cm/sec        < 50%          ICA PSV < 140 cm/sec       Plaque Estimate < 50%       ICA/CCA  PSV Ratio < 2.0       ICA EDV < 40 cm/sec        50- 69%       ICA -230 cm/sec       Plaque Estimate > or = 50%       ICA/CCA PSV Ratio 2.0-4.0       ICA EDV  cm/sec          > or = 70%, less than near occlusion       ICA PSV > 230 cm/sec       Plaque Estimate > or = 50%       ICA/CCA Ratio > 4.0       ICA EDV > 100 cm/sec        Additional criteria from vascular surgery     > 80%       EDV > 120 cm/sec      INDIA CASTRO MD

## 2018-09-17 NOTE — PATIENT INSTRUCTIONS
The morning of your procedure you can take your Valtrex and simvastatin with a small sip of water. Hold off on all your supplements, like calcium, that morning.     Try to decrease your coffee intake and increase water intake. Drink more water in the morning and decrease water throughout the day so that you won't have to get up so much at night.     Please call [facility] (formerly called [facility]) at [phone] to schedule your ultrasound and stress test.     At [facility], we strive to deliver an exceptional experience to you, every time we see you.  If you receive a survey in the mail, please send us back your thoughts. We really do value your feedback.    Suggested websites for health information:  Www.[employer].org : Up to date and easily searchable information on multiple topics.  Www.medlineplus.gov : medication info, interactive tutorials, watch real surgeries online  Www.familydoctor.org : good info from the Academy of Family Physicians  Www.cdc.gov : public health info, travel advisories, epidemics (H1N1)  Www.aap.org : children's health info, normal development, vaccinations  Www.health.state.[place].us : [place] dept of health, public health issues in [place], N1N1    Your care team:     Family Medicine   GALLITO Ivory MD Emily Bunt, APRN CNP   S. MD Radha Wynne MD Angela Wermerskirchen, MD         Clinic hours: Monday - Wednesday 7 am-7 pm   Thursdays and Fridays 7 am-5 pm.     [facility]: Monday - Friday 11 am-9 pm,   Saturday and Sunday 9 am-5 pm.    [facility]: Monday -Thursday 8 am-8 pm; Friday 8 am-6 pm; Saturday and Sunday 9 am-5 pm.     [facility]: Monday - Thursday 8 am - 7 pm; Friday 8 am - 6 pm    Clinic: [phone]   [facility]: [phone]   [facility]: [phone]            Before Your  Surgery      Call your surgeon if there is any change in your health. This includes signs of a cold or flu (such as a sore throat, runny nose, cough, rash or fever).    Do not smoke, drink alcohol or take over the counter medicine (unless your surgeon or primary care doctor tells you to) for the 24 hours before and after surgery.    If you take prescribed drugs: Follow your doctor s orders about which medicines to take and which to stop until after surgery.    Eating and drinking prior to surgery: follow the instructions from your surgeon    Take a shower or bath the night before surgery. Use the soap your surgeon gave you to gently clean your skin. If you do not have soap from your surgeon, use your regular soap. Do not shave or scrub the surgery site.  Wear clean pajamas and have clean sheets on your bed.

## 2018-09-18 LAB
BACTERIA SPEC CULT: NO GROWTH
SPECIMEN SOURCE: NORMAL

## 2018-09-19 ENCOUNTER — RADIANT APPOINTMENT (OUTPATIENT)
Dept: ULTRASOUND IMAGING | Facility: CLINIC | Age: 68
End: 2018-09-19
Attending: FAMILY MEDICINE
Payer: COMMERCIAL

## 2018-09-19 DIAGNOSIS — R06.09 DOE (DYSPNEA ON EXERTION): ICD-10-CM

## 2018-09-19 DIAGNOSIS — R42 ORTHOSTATIC DIZZINESS: ICD-10-CM

## 2018-09-19 DIAGNOSIS — Z82.3 FAMILY HISTORY OF STROKE (CEREBROVASCULAR): ICD-10-CM

## 2018-09-19 PROCEDURE — 93880 EXTRACRANIAL BILAT STUDY: CPT

## 2018-09-24 ENCOUNTER — TELEPHONE (OUTPATIENT)
Dept: CARDIOLOGY | Facility: CLINIC | Age: 68
End: 2018-09-24

## 2018-09-24 NOTE — TELEPHONE ENCOUNTER
Called patient prior to stress echo scheduled for Sept 25 to discuss necessary preparation for test and assess for questions/concerns.  No answer. Left message reminding patient to remain NPO except water for at least 3 hours prior to test, take all medications as usual, to avoid caffeine and nicotine for 12 hr (including chocolate, decaf, Excedrin) and to wear comfortable clothing and shoes. Also left contact information for questions/concerns.

## 2018-09-25 ENCOUNTER — RADIANT APPOINTMENT (OUTPATIENT)
Dept: CARDIOLOGY | Facility: CLINIC | Age: 68
End: 2018-09-25
Attending: FAMILY MEDICINE
Payer: COMMERCIAL

## 2018-09-25 ENCOUNTER — OFFICE VISIT (OUTPATIENT)
Dept: DERMATOLOGY | Facility: CLINIC | Age: 68
End: 2018-09-25
Payer: COMMERCIAL

## 2018-09-25 DIAGNOSIS — L57.0 ACTINIC KERATOSIS: ICD-10-CM

## 2018-09-25 DIAGNOSIS — D48.5 NEOPLASM OF UNCERTAIN BEHAVIOR OF SKIN: Primary | ICD-10-CM

## 2018-09-25 DIAGNOSIS — I49.3 PVC'S (PREMATURE VENTRICULAR CONTRACTIONS): ICD-10-CM

## 2018-09-25 DIAGNOSIS — R42 ORTHOSTATIC DIZZINESS: ICD-10-CM

## 2018-09-25 DIAGNOSIS — R06.09 DOE (DYSPNEA ON EXERTION): ICD-10-CM

## 2018-09-25 PROCEDURE — 93350 STRESS TTE ONLY: CPT | Mod: 26 | Performed by: INTERNAL MEDICINE

## 2018-09-25 PROCEDURE — 11101 HC BIOPSY SKIN/SUBQ/MUC MEM, EACH ADDTL LESION: CPT | Performed by: DERMATOLOGY

## 2018-09-25 PROCEDURE — 93352 ADMIN ECG CONTRAST AGENT: CPT

## 2018-09-25 PROCEDURE — 93018 CV STRESS TEST I&R ONLY: CPT | Performed by: INTERNAL MEDICINE

## 2018-09-25 PROCEDURE — 93325 DOPPLER ECHO COLOR FLOW MAPG: CPT | Mod: 26 | Performed by: INTERNAL MEDICINE

## 2018-09-25 PROCEDURE — 93016 CV STRESS TEST SUPVJ ONLY: CPT

## 2018-09-25 PROCEDURE — 93321 DOPPLER ECHO F-UP/LMTD STD: CPT | Mod: TC

## 2018-09-25 PROCEDURE — 93325 DOPPLER ECHO COLOR FLOW MAPG: CPT | Mod: TC

## 2018-09-25 PROCEDURE — 11100 HC BIOPSY SKIN/SUBQ/MUC MEM, SINGLE LESION: CPT | Mod: 59 | Performed by: DERMATOLOGY

## 2018-09-25 PROCEDURE — 93350 STRESS TTE ONLY: CPT | Mod: TC

## 2018-09-25 PROCEDURE — 88305 TISSUE EXAM BY PATHOLOGIST: CPT | Mod: TC | Performed by: DERMATOLOGY

## 2018-09-25 PROCEDURE — 93321 DOPPLER ECHO F-UP/LMTD STD: CPT | Mod: 26 | Performed by: INTERNAL MEDICINE

## 2018-09-25 PROCEDURE — 93017 CV STRESS TEST TRACING ONLY: CPT

## 2018-09-25 PROCEDURE — 17000 DESTRUCT PREMALG LESION: CPT | Performed by: DERMATOLOGY

## 2018-09-25 RX ADMIN — Medication 3 ML: at 10:24

## 2018-09-25 ASSESSMENT — PAIN SCALES - GENERAL: PAINLEVEL: NO PAIN (0)

## 2018-09-25 NOTE — MR AVS SNAPSHOT
After Visit Summary   9/25/2018    Kumar Gonzalez    MRN: [id number]           Patient Information     Date Of Birth          1950        Visit Information        Provider Department      9/25/2018 8:30 AM Misty Staples MD [facility]        Today's Diagnoses     Neoplasm of uncertain behavior of skin    -  1    Actinic keratosis          Care Instructions    Cryotherapy    What is it?    Use of a very cold liquid, such as liquid nitrogen, to freeze and destroy abnormal skin cells that need to be removed    What should I expect?    Tenderness and redness    A small blister that might grow and fill with dark purple blood. There may be crusting.    More than one treatment may be needed if the lesions do not go away.    How do I care for the treated area?    Gently wash the area with your hands when bathing.    Use a thin layer of Vaseline to help with healing. You may use a Band-Aid.     The area should heal within 7-10 days and may leave behind a pink or lighter color.     Do not use an antibiotic or Neosporin ointment.     You may take acetaminophen (Tylenol) for pain.     Call your Doctor if you have:    Severe pain    Signs of infection (warmth, redness, cloudy yellow drainage, and or a bad smell)    Questions or concerns    Who should I call with questions?       [facility]: [phone]       [facility]: [phone]       For urgent needs outside of business hours call the [facility] at [phone]        and ask for the dermatology resident on call  Wound Care After a Biopsy    What is a skin biopsy?  A skin biopsy allows the doctor to examine a very small piece of tissue under the microscope to determine the diagnosis and the best treatment for the skin condition. A local anesthetic (numbing medicine)  is injected with a very small needle into the skin area to be tested. A small piece of skin is taken from  the area. Sometimes a suture (stitch) is used.     What are the risks of a skin biopsy?  I will experience scar, bleeding, swelling, pain, crusting and redness. I may experience incomplete removal or recurrence. Risks of this procedure are excessive bleeding, bruising, infection, nerve damage, numbness, thick (hypertrophic or keloidal) scar and non-diagnostic biopsy.    How should I care for my wound for the first 24 hours?    Keep the wound dry and covered for 24 hours    If it bleeds, hold direct pressure on the area for 15 minutes. If bleeding does not stop then go to the emergency room    Avoid strenuous exercise the first 1-2 days or as your doctor instructs you    How should I care for the wound after 24 hours?    After 24 hours, remove the bandage    You may bathe or shower as normal    If you had a scalp biopsy, you can shampoo as usual and can use shower water to clean the biopsy site daily    Clean the wound twice a day with gentle soap and water    Do not scrub, be gentle    Apply white petroleum/Vaseline after cleaning the wound with a cotton swab or a clean finger, and keep the site covered with a Bandaid /bandage. Bandages are not necessary with a scalp biopsy    If you are unable to cover the site with a Bandaid /bandage, re-apply ointment 2-3 times a day to keep the site moist. Moisture will help with healing    Avoid strenuous activity for first 1-2 days    Avoid lakes, rivers, pools, and oceans until the stitches are removed or the site is healed    How do I clean my wound?    Wash hands thoroughly with soap or use hand  before all wound care    Clean the wound with gentle soap and water    Apply white petroleum/Vaseline  to wound after it is clean    Replace the Bandaid /bandage to keep the wound covered for the first few days or as instructed by your doctor    If you had a scalp biopsy, warm shower water to the area on a daily basis should suffice    What should I use to clean my wound?      Cotton-tipped applicators (Qtips )    White petroleum jelly (Vaseline ). Use a clean new container and use Q-tips to apply.    Bandaids   as needed    Gentle soap     How should I care for my wound long term?    Do not get your wound dirty    Keep up with wound care for one week or until the area is healed.    A small scab will form and fall off by itself when the area is completely healed. The area will be red and will become pink in color as it heals. Sun protection is very important for how your scar will turn out. Sunscreen with an SPF 30 or greater is recommended once the area is healed.    You should have some soreness but it should be mild and slowly go away over several days. Talk to your doctor about using tylenol for pain,    When should I call my doctor?  If you have increased:     Pain or swelling    Pus or drainage (clear or slightly yellow drainage is ok)    Temperature over 100F    Spreading redness or warmth around wound    When will I hear about my results?  The biopsy results can take 2-3 weeks to come back. The clinic will call you with the results, send you a Zelgor message, or have you schedule a follow-up clinic or phone time to discuss the results. Contact our clinics if you do not hear from us in 3 weeks.     Who should I call with questions?    [facility]: [phone]     [facility]: [phone]    For urgent needs outside of business hours call the [facility] at [phone] and ask for the dermatology resident on call              Follow-ups after your visit        Follow-up notes from your care team     Return in about 6 months (around 3/25/2019) for hx of precancers.      Your next 10 appointments already scheduled     Sep 27, 2018 11:40 AM CDT   Return Visit with Dagoberto Plaza,    [facility]    [address] [place] [id number] [phone]             Mar 26, 2019  7:45 AM CDT   [facility]    [address] [place] [id number] [phone]              Who to contact     If you have questions or need follow up information about today's clinic visit or your schedule please contact [facility] directly at [phone].  Normal or non-critical lab and imaging results will be communicated to you by Flashstockhart, letter or phone within 4 business days after the clinic has received the results. If you do not hear from us within 7 days, please contact the clinic through Flashstockhart or phone. If you have a critical or abnormal lab result, we will notify you by phone as soon as possible.  Submit refill requests through [employer] or call your pharmacy and they will forward the refill request to us. Please allow 3 business days for your refill to be completed.          Additional Information About Your Visit        [employer] Security Information     [employer] Security is an electronic gateway that provides easy, online access to your medical records. With Blue Security, you can request a clinic appointment, read your test results, renew a prescription or communicate with your care team.     To sign up for [employer] visit the website at [email or web address]   You will be asked to enter the access code listed below, as well as some personal information. Please follow the directions to create your username and password.     Your access code is: [email or web address]  Expires: 2018 12:15 PM     Your access code will  in 90 days. If you need help or a new code, please contact your [facility] or call [phone] for assistance.        Care EveryWhere ID     This is your Care EveryWhere ID. This could be used by other organizations to access your [place] medical records  [id number]         Blood Pressure from Last 3 Encounters:   18 107/68    04/30/18 130/80   04/13/17 108/60    Weight from Last 3 Encounters:   09/17/18 175 lb (79.4 kg)   05/10/18 178 lb 3.2 oz (80.8 kg)   04/30/18 175 lb (79.4 kg)              We Performed the Following     BIOPSY SKIN/SUBQ/MUC MEM, EACH ADDTL LESION     BIOPSY SKIN/SUBQ/MUC MEM, SINGLE LESION     Dermatological path order and indications     DESTRUCT PREMALIGNANT LESION, FIRST        Primary Care Provider Office Phone # Fax #    Suzanne Torres [phone] [address] [facility] [address]  [facility] [address]        Equal Access to Services     MIROSLAVA EM : Hadii valeri Alex, wamonroe mae, qacatrina kaalmada kvng, raine parkinson. So [facility] [phone].    ATENCIÓN: Si habla español, tiene a alcantar disposición servicios gratuitos de asistencia lingüística. Llame al [phone].    We comply with applicable federal civil rights laws and [place] laws. We do not discriminate on the basis of race, color, national origin, age, disability, sex, sexual orientation, or gender identity.            Thank you!     Thank you for choosing [facility]  for your care. Our goal is always to provide you with excellent care. Hearing back from our patients is one way we can continue to improve our services. Please take a few minutes to complete the written survey that you may receive in the mail after your visit with us. Thank you!             Your Updated Medication List - Protect others around you: Learn how to safely use, store and throw away your medicines at www.disposemymeds.org.          This list is accurate as of 9/25/18  9:36 AM.  Always use your most recent med list.                   Brand Name Dispense Instructions for use Diagnosis    CALCIUM + D PO      Take by mouth daily 1200 MG/1000 MG D3        metroNIDAZOLE 0.75 % cream    METROCREAM    45 g    Apply topically daily    Rosacea       ofloxacin 0.3 % ophthalmic solution    OCUFLOX           prednisoLONE acetate 1 % ophthalmic susp    PRED FORTE          simvastatin 20 MG tablet    ZOCOR    90 tablet    TAKE ONE TABLET BY MOUTH AT BEDTIME    Hyperlipidemia LDL goal <130       VALTREX PO      Take 1,000 mg by mouth 2 times daily

## 2018-09-25 NOTE — NURSING NOTE
Patient presents today for stress echo ordered by MD. Prior to patient visit, chart prep done including confirmation of order, medications reviewed for contraindications, reviewed previous EKG's for trends & concerns and reviewed patient's medical history.     IV started in left AC with a 22G Jeclo Catheter.      Echo technician completed resting portion of echo.     Stress portion of echo completed utilizing bike and pictures taken at [facility].  Blood pressure taken every 2 minutes and documented in Muse system.     Optison medication given 3 ml, wasted 6 ml  3ml Optison mixed with 6ml Saline - [id number]       Patient offered complaints of: fatigue     After completion of stress echo, recovery period with blood pressure monitoring occurs at 1, 3 and 5 minutes and documented in Muse.  IV removed and water provided to patient.  Patient education provided about cardiology interpretation and primary provider will be notified of results.     Dr Maria provided supervision of the tests performed today.

## 2018-09-25 NOTE — NURSING NOTE
Kumar Gonzalez's goals for this visit include:   Chief Complaint   Patient presents with     Derm Problem     recheck AK's: A: Skin, left preauricular B: Skin, Mid back. Spot of conern in right forearm       He requests these members of his care team be copied on today's visit information: PCP    PCP: Suzanne Torres    Referring Provider:  No referring provider defined for this encounter.    There were no vitals taken for this visit.    Do you need any medication refills at today's visit? None      Kiara Godwin, [facility]

## 2018-09-25 NOTE — PROGRESS NOTES
[facility] Dermatology Note      Dermatology Problem List:  1. Family history of melanoma  2.  AK  3. Onychomycosis  - s/p Terbinafine 250 mg fall 2015    Encounter Date: Sep 25, 2018    CC:   Chief Complaint   Patient presents with     Derm Problem     recheck AK's: A: Skin, left preauricular B: Skin, Mid back. Spot of conern in right forearm         History of Present Illness:  This 68 year old male with a personal history of actinic keratosis presents for recheck of several biopsied HAKs and AKs. He was last seen in dermatology on 5/10/18 when the HAK on the left preauricular, AK on the mid back, and HAK on the right dorsal hand were biopsied. Today, the patient reports that these areas seem to have resolved except for the spot on the left preauricular. However, he has a new area of concern: the right forearm, where he has a red spot which has bothered him for the last few months. No other concerns.       Past Medical History:   Patient Active Problem List   Diagnosis     Alopecia     Family history of stroke (cerebrovascular)     HYPERLIPIDEMIA LDL GOAL <130     Colon polyp     BPH (benign prostatic hyperplasia)     Loss of height     Erectile dysfunction     Impacted cerumen     Rosacea     Osteoporosis     Lentigo     Family history of melanoma     History of actinic keratosis     Xerosis cutis     Advanced directives, counseling/discussion     Vitamin D deficiency     KP (keratosis pilaris)     AK (actinic keratosis)     Anesthesia complication     Solar lentigo     Insomnia     Past Medical History:   Diagnosis Date     Actinic keratosis      ALOPECIA NOS 3/24/2008     BPH (benign prostatic hyperplasia) 12/1/2010     Erectile dysfunction 12/1/2010     Hyperlipidemia LDL goal <130 10/31/2010     Seizure (H) 10/28/2008    after general anesthesia     Vitamin D deficiency 1/5/2012    Mild, noted 2011 and 2012     Past Surgical History:   Procedure Laterality Date     C APPENDECTOMY  8th  Grade     CA ANESTH,CORNEAL TRANSPLANT  2008?     EYE SURGERY      iris surgery     HC PNEUMATIC RETINOPEXY  Multiple    Bilateral       Social History:  The patient works as a . Loves [employer].  Kept in chart for convenience.       Family History:  Family history of melanoma  Kept in chart for convenience.       Medications:  Current Outpatient Prescriptions   Medication Sig Dispense Refill     Calcium Carbonate-Vitamin D (CALCIUM + D PO) Take by mouth daily 1200 MG/1000 MG D3       metroNIDAZOLE (METROCREAM) 0.75 % cream Apply topically daily 45 g 11     prednisoLONE acetate (PRED FORTE) 1 % ophthalmic suspension        simvastatin (ZOCOR) 20 MG tablet TAKE ONE TABLET BY MOUTH AT BEDTIME  90 tablet 3     ValACYclovir HCl (VALTREX PO) Take 1,000 mg by mouth 2 times daily        ofloxacin (OCUFLOX) 0.3 % ophthalmic solution           No Known Allergies      Review of Systems:  -As per HPI  -Constitutional: The patient is generally feeling well.    Physical exam:  There were no vitals taken for this visit.  -GEN: This is a well developed, well-nourished male in no acute distress, in a pleasant mood.    -SKIN: Focused exam of the left preauricular, mid back, right dorsal hand and forearm was performed.   - 5 mm erythematous papule with scale on the right forearm   - Pink papule, skin colored, on the right dorsal hand.   - Nonhealing biopsy site with erythematous plaque and scale, about 1 cm, on the left preauricular.   -No other lesions of concern on areas examined.     Impression/Plan:  1. Family history of melanoma     Recommend sunscreens SPF #30 or greater, protective clothing and avoidance of tanning beds.      2. ,Nonhealing biopsy site with erythematous plaque and scale, about 1 cm, on the left preauricular - was a HAK s/p biopsy 5/10/18. Neoplasm of uncertain behavior. R/o SCC.     Shave biopsy:  After discussion of benefits and risks including but not limited to bleeding/bruising,  pain/swelling, infection, scar, incomplete removal, nerve damage/numbness, recurrence, and non-diagnostic biopsy, written consent, verbal consent and photographs were obtained. Time-out was performed. The area was cleaned with isopropyl alcohol. 0.5mL of 1% lidocaine with epinephrine was injected to obtain adequate anesthesia of the lesion on the left preauricular. A shave biopsy was performed. Hemostasis was achieved with aluminium chloride. Vaseline and a sterile dressing were applied. The patient tolerated the procedure and no complications were noted. The patient was provided with verbal and written post care instructions.     If Aks will plan for efudex.     3. AK, mid back, s/p bx 5/10/18 - nearly resolved, okay for cryotherapy  Cryotherapy procedure note: After verbal consent and discussion of risks and benefits including but no limited to dyspigmentation/scar, blister, and pain, 1 was(were) treated with 1-2mm freeze border for 2 cycles with liquid nitrogen. Post cryotherapy instructions were provided.     4. , Pink papule, skin colored, on the right dorsal hand, previously biopsied HAK. Neoplasm of uncertain behavior. Differential includes scar vs HAK vs SCC, not consistent with AK today so would not cryo    Shave biopsy:  After discussion of benefits and risks including but not limited to bleeding/bruising, pain/swelling, infection, scar, incomplete removal, nerve damage/numbness, recurrence, and non-diagnostic biopsy, written consent, verbal consent and photographs were obtained. Time-out was performed. The area was cleaned with isopropyl alcohol. 0.5mL of 1% lidocaine with epinephrine was injected to obtain adequate anesthesia of the lesion on the right dorsal ahnd. A shave biopsy was performed. Hemostasis was achieved with aluminium chloride. Vaseline and a sterile dressing were applied. The patient tolerated the procedure and no complications were noted. The patient was provided with verbal and written  post care instructions.         5. 5 mm erythematous papule with scale on the right forearm. NUB. Ddx includes SCC vs AK vs SK    Shave biopsy:  After discussion of benefits and risks including but not limited to bleeding/bruising, pain/swelling, infection, scar, incomplete removal, nerve damage/numbness, recurrence, and non-diagnostic biopsy, written consent, verbal consent and photographs were obtained. Time-out was performed. The area was cleaned with isopropyl alcohol. 0.5mL of 1% lidocaine with epinephrine was injected to obtain adequate anesthesia of the lesion on the right forearm. A shave biopsy was performed. Hemostasis was achieved with aluminium chloride. Vaseline and a sterile dressing were applied. The patient tolerated the procedure and no complications were noted. The patient was provided with verbal and written post care instructions.       6. Hx of rosacea- not addressed    Follow-up in 6 months pending biopsy results.     Staff Involved:  Scribe/Staff    Scribe Disclosure  I, Jamey Stoll, am serving as a scribe to document services personally performed by Dr. Misty Staples MD, based on data collection and the provider's statements to me.     Provider Disclosure:   The documentation recorded by the scribe accurately reflects the services I personally performed and the decisions made by me.    Misty Staples MD    Department of Dermatology  [facility]: Phone: [phone], Fax:[phone]  [facility]: Phone: [phone], Fax: [phone]

## 2018-09-25 NOTE — LETTER
9/25/2018         RE: Kumar Gonzalez  [address]  [facility] [address]        Dear Colleague,    Thank you for referring your patient, Kumar Gonzalez, to the [facility]. Please see a copy of my visit note below.    [facility] Dermatology Note      Dermatology Problem List:  1. Family history of melanoma  2.  AK  3. Onychomycosis  - s/p Terbinafine 250 mg fall 2015    Encounter Date: Sep 25, 2018    CC:   Chief Complaint   Patient presents with     Derm Problem     recheck AK's: A: Skin, left preauricular B: Skin, Mid back. Spot of conern in right forearm         History of Present Illness:  This 68 year old male with a personal history of actinic keratosis presents for recheck of several biopsied HAKs and AKs. He was last seen in dermatology on 5/10/18 when the HAK on the left preauricular, AK on the mid back, and HAK on the right dorsal hand were biopsied. Today, the patient reports that these areas seem to have resolved except for the spot on the left preauricular. However, he has a new area of concern: the right forearm, where he has a red spot which has bothered him for the last few months. No other concerns.       Past Medical History:   Patient Active Problem List   Diagnosis     Alopecia     Family history of stroke (cerebrovascular)     HYPERLIPIDEMIA LDL GOAL <130     Colon polyp     BPH (benign prostatic hyperplasia)     Loss of height     Erectile dysfunction     Impacted cerumen     Rosacea     Osteoporosis     Lentigo     Family history of melanoma     History of actinic keratosis     Xerosis cutis     Advanced directives, counseling/discussion     Vitamin D deficiency     KP (keratosis pilaris)     AK (actinic keratosis)     Anesthesia complication     Solar lentigo     Insomnia     Past Medical History:   Diagnosis Date     Actinic keratosis      ALOPECIA NOS 3/24/2008     BPH (benign prostatic hyperplasia) 12/1/2010     Erectile dysfunction  12/1/2010     Hyperlipidemia LDL goal <130 10/31/2010     Seizure (H) 10/28/2008    after general anesthesia     Vitamin D deficiency 1/5/2012    Mild, noted 2011 and 2012     Past Surgical History:   Procedure Laterality Date     C APPENDECTOMY  8th Grade     CA ANESTH,CORNEAL TRANSPLANT  2008?     EYE SURGERY      iris surgery     HC PNEUMATIC RETINOPEXY  Multiple    Bilateral       Social History:  The patient works as a . Loves [employer].  Kept in chart for convenience.       Family History:  Family history of melanoma  Kept in chart for convenience.       Medications:  Current Outpatient Prescriptions   Medication Sig Dispense Refill     Calcium Carbonate-Vitamin D (CALCIUM + D PO) Take by mouth daily 1200 MG/1000 MG D3       metroNIDAZOLE (METROCREAM) 0.75 % cream Apply topically daily 45 g 11     prednisoLONE acetate (PRED FORTE) 1 % ophthalmic suspension        simvastatin (ZOCOR) 20 MG tablet TAKE ONE TABLET BY MOUTH AT BEDTIME  90 tablet 3     ValACYclovir HCl (VALTREX PO) Take 1,000 mg by mouth 2 times daily        ofloxacin (OCUFLOX) 0.3 % ophthalmic solution           No Known Allergies      Review of Systems:  -As per HPI  -Constitutional: The patient is generally feeling well.    Physical exam:  There were no vitals taken for this visit.  -GEN: This is a well developed, well-nourished male in no acute distress, in a pleasant mood.    -SKIN: Focused exam of the left preauricular, mid back, right dorsal hand and forearm was performed.   - 5 mm erythematous papule with scale on the right forearm   - Pink papule, skin colored, on the right dorsal hand.   - Nonhealing biopsy site with erythematous plaque and scale, about 1 cm, on the left preauricular.   -No other lesions of concern on areas examined.     Impression/Plan:  1. Family history of melanoma     Recommend sunscreens SPF #30 or greater, protective clothing and avoidance of tanning beds.      2. ,Nonhealing biopsy site with  erythematous plaque and scale, about 1 cm, on the left preauricular - was a HAK s/p biopsy 5/10/18. Neoplasm of uncertain behavior. R/o SCC.     Shave biopsy:  After discussion of benefits and risks including but not limited to bleeding/bruising, pain/swelling, infection, scar, incomplete removal, nerve damage/numbness, recurrence, and non-diagnostic biopsy, written consent, verbal consent and photographs were obtained. Time-out was performed. The area was cleaned with isopropyl alcohol. 0.5mL of 1% lidocaine with epinephrine was injected to obtain adequate anesthesia of the lesion on the left preauricular. A shave biopsy was performed. Hemostasis was achieved with aluminium chloride. Vaseline and a sterile dressing were applied. The patient tolerated the procedure and no complications were noted. The patient was provided with verbal and written post care instructions.     If Aks will plan for efudex.     3. AK, mid back, s/p bx 5/10/18 - nearly resolved, okay for cryotherapy  Cryotherapy procedure note: After verbal consent and discussion of risks and benefits including but no limited to dyspigmentation/scar, blister, and pain, 1 was(were) treated with 1-2mm freeze border for 2 cycles with liquid nitrogen. Post cryotherapy instructions were provided.     4. , Pink papule, skin colored, on the right dorsal hand, previously biopsied HAK. Neoplasm of uncertain behavior. Differential includes scar vs HAK vs SCC, not consistent with AK today so would not cryo    Shave biopsy:  After discussion of benefits and risks including but not limited to bleeding/bruising, pain/swelling, infection, scar, incomplete removal, nerve damage/numbness, recurrence, and non-diagnostic biopsy, written consent, verbal consent and photographs were obtained. Time-out was performed. The area was cleaned with isopropyl alcohol. 0.5mL of 1% lidocaine with epinephrine was injected to obtain adequate anesthesia of the lesion on the right dorsal  ahnd. A shave biopsy was performed. Hemostasis was achieved with aluminium chloride. Vaseline and a sterile dressing were applied. The patient tolerated the procedure and no complications were noted. The patient was provided with verbal and written post care instructions.         5. 5 mm erythematous papule with scale on the right forearm. NUB. Ddx includes SCC vs AK vs SK    Shave biopsy:  After discussion of benefits and risks including but not limited to bleeding/bruising, pain/swelling, infection, scar, incomplete removal, nerve damage/numbness, recurrence, and non-diagnostic biopsy, written consent, verbal consent and photographs were obtained. Time-out was performed. The area was cleaned with isopropyl alcohol. 0.5mL of 1% lidocaine with epinephrine was injected to obtain adequate anesthesia of the lesion on the right forearm. A shave biopsy was performed. Hemostasis was achieved with aluminium chloride. Vaseline and a sterile dressing were applied. The patient tolerated the procedure and no complications were noted. The patient was provided with verbal and written post care instructions.       6. Hx of rosacea- not addressed    Follow-up in 6 months pending biopsy results.     Staff Involved:  Scribe/Staff    Scribe Disclosure  I, Jamey Stoll, am serving as a scribe to document services personally performed by Dr. Misty Staples MD, based on data collection and the provider's statements to me.     Provider Disclosure:   The documentation recorded by the scribe accurately reflects the services I personally performed and the decisions made by me.    Misty Staples MD    Department of Dermatology  [facility]: Phone: [phone], Fax:[phone]  [facility]: Phone: [phone], Fax: [phone]              Again, thank you for allowing me to participate in the care of your patient.         Sincerely,        Misty Staples MD

## 2018-09-25 NOTE — PATIENT INSTRUCTIONS
Cryotherapy    What is it?    Use of a very cold liquid, such as liquid nitrogen, to freeze and destroy abnormal skin cells that need to be removed    What should I expect?    Tenderness and redness    A small blister that might grow and fill with dark purple blood. There may be crusting.    More than one treatment may be needed if the lesions do not go away.    How do I care for the treated area?    Gently wash the area with your hands when bathing.    Use a thin layer of Vaseline to help with healing. You may use a Band-Aid.     The area should heal within 7-10 days and may leave behind a pink or lighter color.     Do not use an antibiotic or Neosporin ointment.     You may take acetaminophen (Tylenol) for pain.     Call your Doctor if you have:    Severe pain    Signs of infection (warmth, redness, cloudy yellow drainage, and or a bad smell)    Questions or concerns    Who should I call with questions?       [facility]: [phone]       [facility]: [phone]       For urgent needs outside of business hours call the [facility] at [phone]        and ask for the dermatology resident on call  Wound Care After a Biopsy    What is a skin biopsy?  A skin biopsy allows the doctor to examine a very small piece of tissue under the microscope to determine the diagnosis and the best treatment for the skin condition. A local anesthetic (numbing medicine)  is injected with a very small needle into the skin area to be tested. A small piece of skin is taken from the area. Sometimes a suture (stitch) is used.     What are the risks of a skin biopsy?  I will experience scar, bleeding, swelling, pain, crusting and redness. I may experience incomplete removal or recurrence. Risks of this procedure are excessive bleeding, bruising, infection, nerve damage, numbness, thick (hypertrophic or keloidal) scar and non-diagnostic biopsy.    How should I care for  my wound for the first 24 hours?    Keep the wound dry and covered for 24 hours    If it bleeds, hold direct pressure on the area for 15 minutes. If bleeding does not stop then go to the emergency room    Avoid strenuous exercise the first 1-2 days or as your doctor instructs you    How should I care for the wound after 24 hours?    After 24 hours, remove the bandage    You may bathe or shower as normal    If you had a scalp biopsy, you can shampoo as usual and can use shower water to clean the biopsy site daily    Clean the wound twice a day with gentle soap and water    Do not scrub, be gentle    Apply white petroleum/Vaseline after cleaning the wound with a cotton swab or a clean finger, and keep the site covered with a Bandaid /bandage. Bandages are not necessary with a scalp biopsy    If you are unable to cover the site with a Bandaid /bandage, re-apply ointment 2-3 times a day to keep the site moist. Moisture will help with healing    Avoid strenuous activity for first 1-2 days    Avoid lakes, rivers, pools, and oceans until the stitches are removed or the site is healed    How do I clean my wound?    Wash hands thoroughly with soap or use hand  before all wound care    Clean the wound with gentle soap and water    Apply white petroleum/Vaseline  to wound after it is clean    Replace the Bandaid /bandage to keep the wound covered for the first few days or as instructed by your doctor    If you had a scalp biopsy, warm shower water to the area on a daily basis should suffice    What should I use to clean my wound?     Cotton-tipped applicators (Qtips )    White petroleum jelly (Vaseline ). Use a clean new container and use Q-tips to apply.    Bandaids   as needed    Gentle soap     How should I care for my wound long term?    Do not get your wound dirty    Keep up with wound care for one week or until the area is healed.    A small scab will form and fall off by itself when the area is completely  healed. The area will be red and will become pink in color as it heals. Sun protection is very important for how your scar will turn out. Sunscreen with an SPF 30 or greater is recommended once the area is healed.    You should have some soreness but it should be mild and slowly go away over several days. Talk to your doctor about using tylenol for pain,    When should I call my doctor?  If you have increased:     Pain or swelling    Pus or drainage (clear or slightly yellow drainage is ok)    Temperature over 100F    Spreading redness or warmth around wound    When will I hear about my results?  The biopsy results can take 2-3 weeks to come back. The clinic will call you with the results, send you a [employer] message, or have you schedule a follow-up clinic or phone time to discuss the results. Contact our clinics if you do not hear from us in 3 weeks.     Who should I call with questions?    [facility]: [phone]     [facility]: [phone]    For urgent needs outside of business hours call the [facility] at [phone] and ask for the dermatology resident on call

## 2018-09-27 LAB — COPATH REPORT: NORMAL

## 2018-10-03 ENCOUNTER — OFFICE VISIT (OUTPATIENT)
Dept: ORTHOPEDICS | Facility: CLINIC | Age: 68
End: 2018-10-03
Payer: COMMERCIAL

## 2018-10-03 VITALS — SYSTOLIC BLOOD PRESSURE: 106 MMHG | HEART RATE: 80 BPM | DIASTOLIC BLOOD PRESSURE: 72 MMHG

## 2018-10-03 DIAGNOSIS — M70.50 PES ANSERINE BURSITIS: Primary | ICD-10-CM

## 2018-10-03 PROCEDURE — 99214 OFFICE O/P EST MOD 30 MIN: CPT | Performed by: FAMILY MEDICINE

## 2018-10-03 ASSESSMENT — PAIN SCALES - GENERAL: PAINLEVEL: MILD PAIN (3)

## 2018-10-03 NOTE — MR AVS SNAPSHOT
After Visit Summary   10/3/2018    Kumar Gonzalez    MRN: [id number]           Patient Information     Date Of Birth          1950        Visit Information        Provider Department      10/3/2018 7:00 AM Dagoberto Plaza,  [facility]        Today's Diagnoses     Pes anserine bursitis    -  1      Care Instructions    Thanks for coming today.  Ortho/Sports Medicine Clinic  [id number] [place] [address]    To schedule future appointments in Ortho Clinic, you may call [phone].    To schedule ordered imaging by your provider:   Call Central Imaging [phone]    To schedule an injection ordered by your provider:  Call Central Imaging Injection scheduling line: [phone]  [employer] available online at:  [email or web address]    Please call if any further questions or concerns ([phone]).  Clinic hours 8 am to 5 pm.    Return to clinic (call) if symptoms worsen or fail to improve.            Follow-ups after your visit        Your next 10 appointments already scheduled     Mar 26, 2019  7:45 AM CDT   Return Visit with Misty Staples MD   [facility] ([facility])    [id number] [place] [id number] [phone]              Who to contact     If you have questions or need follow up information about today's clinic visit or your schedule please contact [facility] directly at [phone].  Normal or non-critical lab and imaging results will be communicated to you by MyChart, letter or phone within 4 business days after the clinic has received the results. If you do not hear from us within 7 days, please contact the clinic through MyChart or phone. If you have a critical or abnormal lab result, we will notify you by phone as soon as possible.  Submit refill requests through [employer] or call your pharmacy and they will forward the refill request to us. Please allow 3 business days for  your refill to be completed.          Additional Information About Your Visit        [employer] Information     [employer] is an electronic gateway that provides easy, online access to your medical records. With [employer], you can request a clinic appointment, read your test results, renew a prescription or communicate with your care team.     To sign up for [employer] visit the website at [email or web address]   You will be asked to enter the access code listed below, as well as some personal information. Please follow the directions to create your username and password.     Your access code is: [email or web address]  Expires: 2018 12:15 PM     Your access code will  in 90 days. If you need help or a new code, please contact your [facility] or call [phone] for assistance.        Care EveryWhere ID     This is your Care EveryWhere ID. This could be used by other organizations to access your [place] medical records  [id number]        Your Vitals Were     Pulse                   80            Blood Pressure from Last 3 Encounters:   10/03/18 106/72   18 107/68   18 130/80    Weight from Last 3 Encounters:   18 79.4 kg (175 lb)   05/10/18 80.8 kg (178 lb 3.2 oz)   18 79.4 kg (175 lb)              Today, you had the following     No orders found for display         Today's Medication Changes          These changes are accurate as of 10/3/18  7:32 AM.  If you have any questions, ask your nurse or doctor.               Start taking these medicines.        Dose/Directions    diclofenac 1 % Gel topical gel   Commonly known as:  VOLTAREN   Used for:  Pes anserine bursitis   Started by:  Dagoberto Plaza, DO        Apply 4 grams to knees up to four times daily using enclosed dosing card.   Quantity:  100 g   Refills:  1            Where to get your medicines      These medications were sent to [id number] [place], [place] - [address] [address]  [address]  [address], [facility] [address]     Phone:  [phone]     diclofenac 1 % Gel topical gel                Primary Care Provider Office Phone # Fax #    Suzanne Ginger Torres [phone] [address]  [facility] [address]        Equal Access to Services     [facility]: Hadii aad ku hadasho Soomaali, waaxda luqadaha, qaybta kaalmada adeegyada, waxay idiin hayaan adeeg khbrooksh laJuanaan . So [facility] [phone].    ATENCIÓN: Si habla español, tiene a alcantar disposición servicios gratuitos de asistencia lingüística. Llame al [phone].    We comply with applicable federal civil rights laws and [place] laws. We do not discriminate on the basis of race, color, national origin, age, disability, sex, sexual orientation, or gender identity.            Thank you!     Thank you for choosing [facility]  for your care. Our goal is always to provide you with excellent care. Hearing back from our patients is one way we can continue to improve our services. Please take a few minutes to complete the written survey that you may receive in the mail after your visit with us. Thank you!             Your Updated Medication List - Protect others around you: Learn how to safely use, store and throw away your medicines at www.disposemymeds.org.          This list is accurate as of 10/3/18  7:32 AM.  Always use your most recent med list.                   Brand Name Dispense Instructions for use Diagnosis    CALCIUM + D PO      Take by mouth daily 1200 MG/1000 MG D3        diclofenac 1 % Gel topical gel    VOLTAREN    100 g    Apply 4 grams to knees up to four times daily using enclosed dosing card.    Pes anserine bursitis       metroNIDAZOLE 0.75 % cream    METROCREAM    45 g    Apply topically daily    Rosacea       ofloxacin 0.3 % ophthalmic solution    OCUFLOX          prednisoLONE acetate 1 % ophthalmic susp    PRED FORTE          simvastatin 20 MG tablet    ZOCOR    90 tablet     TAKE ONE TABLET BY MOUTH AT BEDTIME    Hyperlipidemia LDL goal <130       VALTREX PO      Take 1,000 mg by mouth 2 times daily

## 2018-10-03 NOTE — PROGRESS NOTES
CHIEF COMPLAINT:  Pain of the Right Knee and RECHECK (New problem Right knee no imaging done. )       HISTORY OF PRESENT ILLNESS  Mr. Gonzalez is a pleasant 68 year old year old male who presents to clinic today with right knee pain.    Kumar explains that he was golfing in late August when he twisted his knee.  He felt an immediate pain in his knee.  He points to the medial inferior knee.  He started wearing a compression wrap / brace on his knee, this has helped somewhat in the interim.  In the time that it took to get an appointment his knee has actually gotten quite a bit better.  He denies any swelling but has felt some puffiness on the inside of his knee.  No skin changes.  Also no numbness, tingling, or weakness.      Additional history: as documented    MEDICAL HISTORY  Patient Active Problem List   Diagnosis     Alopecia     Family history of stroke (cerebrovascular)     HYPERLIPIDEMIA LDL GOAL <130     Colon polyp     BPH (benign prostatic hyperplasia)     Loss of height     Erectile dysfunction     Impacted cerumen     Rosacea     Osteoporosis     Lentigo     Family history of melanoma     History of actinic keratosis     Xerosis cutis     Advanced directives, counseling/discussion     Vitamin D deficiency     KP (keratosis pilaris)     AK (actinic keratosis)     Anesthesia complication     Solar lentigo     Insomnia       Current Outpatient Prescriptions   Medication Sig Dispense Refill     Calcium Carbonate-Vitamin D (CALCIUM + D PO) Take by mouth daily 1200 MG/1000 MG D3       metroNIDAZOLE (METROCREAM) 0.75 % cream Apply topically daily 45 g 11     ofloxacin (OCUFLOX) 0.3 % ophthalmic solution        prednisoLONE acetate (PRED FORTE) 1 % ophthalmic suspension        simvastatin (ZOCOR) 20 MG tablet TAKE ONE TABLET BY MOUTH AT BEDTIME  90 tablet 3     ValACYclovir HCl (VALTREX PO) Take 1,000 mg by mouth 2 times daily          No Known Allergies    Family History   Problem Relation Age of Onset      Cerebrovascular Disease Father      Diabetes Paternal Grandfather      Cancer Brother      all uncles, and brother. Unsure type     Cancer Other      cousin with melanoma     Asthma No family hx of      C.A.D. No family hx of      Hypertension No family hx of      Breast Cancer No family hx of      Cancer - colorectal No family hx of      Prostate Cancer No family hx of        Additional medical/Social/Surgical histories reviewed in [facility] and updated as appropriate.     REVIEW OF SYSTEMS (10/3/2018)  CONSTITUTIONAL: Denies fever and weight loss  EYES: Denies acute vision changes  ENT: Denies hearing changes or difficulty swallowing  CARDIAC: Denies chest pain or edema  RESPIRATORY: Denies dyspnea, cough or wheeze  GASTROINTESTINAL: Denies abdominal pain, nausea, vomiting  MUSCULOSKELETAL: See HPI  SKIN: Denies any recent rash or lesion  NEUROLOGICAL: Denies numbness or focal weakness  PSYCHIATRIC: No history of psychiatric symptoms or problems  ENDOCRINE: Denies current diagnosis of diabetes  HEMATOLOGY: Denies episodes of easy bleeding      PHYSICAL EXAM  Vitals:    10/03/18 0706   BP: 106/72   BP Location: Right arm   Patient Position: Chair   Cuff Size: Adult Regular   Pulse: 80     General  - normal appearance, in no obvious distress  CV  - normal popliteal pulse  Pulm  - normal respiratory pattern, non-labored  Musculoskeletal - right knee  - stance: normal gait without limp  - inspection: no swelling or effusion, normal bone and joint alignment, no obvious deformity  - palpation: tender over pes bursa  - ROM: 135 degrees flexion, -5 degrees extension, not painful, normal actively and passively compared to contralateral  - strength: 5/5 in flexion, 5/5 in extension  - special tests:  (-) Lachman  (-) Iva  (-) varus at 0 and 30 degrees flexion  (-) valgus at 0 and 30 degrees flexion    Neuro  - no sensory or motor deficit, grossly normal coordination, normal muscle tone  Skin  - no ecchymosis, erythema,  warmth, or induration, no obvious rash  Psych  - interactive, appropriate, normal mood and affect             ASSESSMENT & PLAN  Mr. Gonzalez is a 68 year old year old male who presents to clinic today with right knee pain.    Kumar's pain is suggestive of pes bursitis - I'm happy that he's improving.  I'm prescribing Voltaren gel for him to use topically.    If Kumar doesn't improve over the next couple of weeks I'd order imaging.    It was a pleasure seeing Kumar today.    Dagoberto Plaza DO, CAQSM  Primary Care Sports Medicine

## 2018-10-03 NOTE — NURSING NOTE
Kumar Gonzalez's goals for this visit include:   Chief Complaint   Patient presents with     Right Knee - Pain     RECHECK     New problem Right knee no imaging done.        He requests these members of his care team be copied on today's visit information: PCP    PCP: Suzanne Torres    Referring Provider:  No referring provider defined for this encounter.    /72 (BP Location: Right arm, Patient Position: Chair, Cuff Size: Adult Regular)  Pulse 80    Do you need any medication refills at today's visit? None      Kiara Godwin, CMA

## 2018-10-03 NOTE — PATIENT INSTRUCTIONS
Thanks for coming today.  [facility]  [address] [place] [address]    To schedule future appointments in Ortho Clinic, you may call [phone].    To schedule ordered imaging by your provider:   Call [facility] [phone]    To schedule an injection ordered by your provider:  Call [facility] Injection scheduling line: [phone]  [email or web address] available online at:  [email or web address]    Please call if any further questions or concerns ([phone]).  Clinic hours 8 am to 5 pm.    Return to clinic (call) if symptoms worsen or fail to improve.

## 2018-10-03 NOTE — LETTER
10/3/2018         RE: Kumar Gonzalez  [address]  [facility] [address]        Dear Colleague,    Thank you for referring your patient, Kumar Gonzalez, to the [facility]. Please see a copy of my visit note below.    CHIEF COMPLAINT:  Pain of the Right Knee and RECHECK (New problem Right knee no imaging done. )       HISTORY OF PRESENT ILLNESS  Mr. Gonzalez is a pleasant 68 year old year old male who presents to clinic today with right knee pain.    Kumar explains that he was golfing in late August when he twisted his knee.  He felt an immediate pain in his knee.  He points to the medial inferior knee.  He started wearing a compression wrap / brace on his knee, this has helped somewhat in the interim.  In the time that it took to get an appointment his knee has actually gotten quite a bit better.  He denies any swelling but has felt some puffiness on the inside of his knee.  No skin changes.  Also no numbness, tingling, or weakness.      Additional history: as documented    MEDICAL HISTORY  Patient Active Problem List   Diagnosis     Alopecia     Family history of stroke (cerebrovascular)     HYPERLIPIDEMIA LDL GOAL <130     Colon polyp     BPH (benign prostatic hyperplasia)     Loss of height     Erectile dysfunction     Impacted cerumen     Rosacea     Osteoporosis     Lentigo     Family history of melanoma     History of actinic keratosis     Xerosis cutis     Advanced directives, counseling/discussion     Vitamin D deficiency     KP (keratosis pilaris)     AK (actinic keratosis)     Anesthesia complication     Solar lentigo     Insomnia       Current Outpatient Prescriptions   Medication Sig Dispense Refill     Calcium Carbonate-Vitamin D (CALCIUM + D PO) Take by mouth daily 1200 MG/1000 MG D3       metroNIDAZOLE (METROCREAM) 0.75 % cream Apply topically daily 45 g 11     ofloxacin (OCUFLOX) 0.3 % ophthalmic solution        prednisoLONE acetate (PRED FORTE) 1 % ophthalmic suspension         simvastatin (ZOCOR) 20 MG tablet TAKE ONE TABLET BY MOUTH AT BEDTIME  90 tablet 3     ValACYclovir HCl (VALTREX PO) Take 1,000 mg by mouth 2 times daily          No Known Allergies    Family History   Problem Relation Age of Onset     Cerebrovascular Disease Father      Diabetes Paternal Grandfather      Cancer Brother      all uncles, and brother. Unsure type     Cancer Other      cousin with melanoma     Asthma No family hx of      C.A.D. No family hx of      Hypertension No family hx of      Breast Cancer No family hx of      Cancer - colorectal No family hx of      Prostate Cancer No family hx of        Additional medical/Social/Surgical histories reviewed in [facility] and updated as appropriate.     REVIEW OF SYSTEMS (10/3/2018)  CONSTITUTIONAL: Denies fever and weight loss  EYES: Denies acute vision changes  ENT: Denies hearing changes or difficulty swallowing  CARDIAC: Denies chest pain or edema  RESPIRATORY: Denies dyspnea, cough or wheeze  GASTROINTESTINAL: Denies abdominal pain, nausea, vomiting  MUSCULOSKELETAL: See HPI  SKIN: Denies any recent rash or lesion  NEUROLOGICAL: Denies numbness or focal weakness  PSYCHIATRIC: No history of psychiatric symptoms or problems  ENDOCRINE: Denies current diagnosis of diabetes  HEMATOLOGY: Denies episodes of easy bleeding      PHYSICAL EXAM  Vitals:    10/03/18 0706   BP: 106/72   BP Location: Right arm   Patient Position: Chair   Cuff Size: Adult Regular   Pulse: 80     General  - normal appearance, in no obvious distress  CV  - normal popliteal pulse  Pulm  - normal respiratory pattern, non-labored  Musculoskeletal - right knee  - stance: normal gait without limp  - inspection: no swelling or effusion, normal bone and joint alignment, no obvious deformity  - palpation: tender over pes bursa  - ROM: 135 degrees flexion, -5 degrees extension, not painful, normal actively and passively compared to contralateral  - strength: 5/5 in flexion, 5/5 in extension  - special  tests:  (-) Lachman  (-) Iva  (-) varus at 0 and 30 degrees flexion  (-) valgus at 0 and 30 degrees flexion    Neuro  - no sensory or motor deficit, grossly normal coordination, normal muscle tone  Skin  - no ecchymosis, erythema, warmth, or induration, no obvious rash  Psych  - interactive, appropriate, normal mood and affect             ASSESSMENT & PLAN  Mr. Gonzalez is a 68 year old year old male who presents to clinic today with right knee pain.    Kumar's pain is suggestive of pes bursitis - I'm happy that he's improving.  I'm prescribing Voltaren gel for him to use topically.    If Kumar doesn't improve over the next couple of weeks I'd order imaging.    It was a pleasure seeing Kumar today.    Dagoberto Plaza DO, [facility]  Primary Care Sports Medicine      Again, thank you for allowing me to participate in the care of your patient.        Sincerely,        Dagoberto Plaza DO

## 2018-10-05 ENCOUNTER — TELEPHONE (OUTPATIENT)
Dept: DERMATOLOGY | Facility: CLINIC | Age: 68
End: 2018-10-05

## 2018-10-05 NOTE — TELEPHONE ENCOUNTER
MOHS previsit information                                                    Kumar Gonzalez is a 68 year old male     Patient is being referred to Dr. Moore   Referring Provider: Jhoan  For treatment of: squamous cell carcinoma  Site(s): right forearm   -if site is groin or below the knee send to RN to initiate antibiotic prophylaxis protocol.  Previous Records received:  N/A    Medication & Allergy Information                                                    CURRENT MEDICATIONS:   Current Outpatient Prescriptions   Medication Sig Dispense Refill     Calcium Carbonate-Vitamin D (CALCIUM + D PO) Take by mouth daily 1200 MG/1000 MG D3       diclofenac (VOLTAREN) 1 % GEL topical gel Apply 4 grams to knees up to four times daily using enclosed dosing card. 100 g 1     metroNIDAZOLE (METROCREAM) 0.75 % cream Apply topically daily 45 g 11     ofloxacin (OCUFLOX) 0.3 % ophthalmic solution        prednisoLONE acetate (PRED FORTE) 1 % ophthalmic suspension        simvastatin (ZOCOR) 20 MG tablet TAKE ONE TABLET BY MOUTH AT BEDTIME  90 tablet 3     ValACYclovir HCl (VALTREX PO) Take 1,000 mg by mouth 2 times daily          Do you take the following medications:  Aspirin:  N/A  Ibuprofen/Advil/Motrin, Aleve/Naproxen:   NO  Coumadin, Eliquis, Pradaxa, Xarelto:   NO   -If on Coumadin, INR should be checked within 7 days of surgery  Vitamin E:   NO  Plavix:   NO    [place]'s wart: NO   Garlic supplement:  NO   Fish Oil: NO  Antibiotic Prophylaxis:  NO    Do you have an ALLERGIC REACTION to any medications:  NO   Review of patient's allergies indicates no known allergies.    Past Medical History                                                    Do you currently or have you previously had any of the following conditions:       HIV/AIDS:  NO    Hepatitis:  NO    Suppressed Immune System:  NO    Autoimmune disorder (eg RA, Lupus):  NO    Prolonged bleeding or bleeding disorder:  NO    Fever blisters/herpes, cold sores:  NO    Kidney  disease:  NO  Creatinine   Date Value Ref Range Status   08/29/2018 1.24 0.66 - 1.25 mg/dL Final   ]    Pacemaker or Defibrillator:  NO.      History of artificial or heart valve replacement:  NO.      Endocarditis: NO    Organ transplant: NO.      Joint replacement within past 2 years: NO    Previous prosthetic joint infection: NO    Diabetes: NO    Pregnant:: NO    Keloids or Abnormal scars: NO    Mobility device (wheelchair, transfer difficulty): NO    Tobacco use:  NO.    History   Smoking Status     Never Smoker   Smokeless Tobacco     Never Used       If any positives, send to RN to initiate antibiotic prophylaxis protocol and/or anticoagulation management protocol    Reviewed by:  Rula Shukla

## 2018-10-05 NOTE — TELEPHONE ENCOUNTER
Notes Recorded by Rula Shukla MA on 10/5/2018 at 10:21 AM  Talked with patient and went through results, patient verbalized understanding, patient scheduled for Monday the 8th at 830 am. This cma went through that it would be an excision for the forearm and biopsy of the hand unless dr. landrum would like to go through other treatment plans with patient. Patient verbalized understanding.     Rula Shukla CMA     ------    Notes Recorded by Aida Nelson, RN on 10/5/2018 at 8:50 AM  I left a message for patient to call [facility].  Aida Nelson RN    ------    Notes Recorded by Misty Staples MD on 10/3/2018 at 10:54 PM  Thanks bridger  Part A is SCC need excision  PArt B recheck at follow up for AK, precancer  Part C is concerning for SCC, needs punch biopsy for diagnosis. Bring patient back after suture removal for repeat biopsy within 2-3 weeks with any  ------    Notes Recorded by Cassie Ramirez LPN on 10/3/2018 at 9:13 AM  Please reread path results and update results/treatment. Three sites in total.     Thanks  Cassie Ramirez LPN    ------    Notes Recorded by Misty Staples MD on 10/2/2018 at 6:56 PM  Part A is precancer  Part B is concerning for SCC, needs punch biopsy for diagnosis. Bring patient back after suture removal for repeat biopsy within 2-3 weeks with any        10d ago       Copath Report Patient Name: MERVAT CARVER   MR#: [id number]   Specimen #: [id number]   Collected: 9/25/2018   Received: 9/25/2018   Reported: 9/27/2018 10:17   Ordering Phy(s): MISTY STAPLES     For improved result formatting, select 'View Enhanced Report Format' under    Linked Documents section.     SPECIMEN(S):   A: Skin,right forearm   B: Skin, left preauricular   C: Skin, right dorsal hand     FINAL DIAGNOSIS:   A. Skin, right forearm:   - Squamous cell carcinoma, well differentiated, extending to the deep   margin - (see description)     B. Skin, left preauricular:   -  "Actinic keratosis - (see comment)     C. Skin, right dorsal hand:   - Suspicious for squamous cell carcinoma - (see comment)     COMMENT:   B. The lesion extends to the deep margin via follicular extension.  Thus   if it persists or recurs, additional   sampling is recommended.     C. Few dermal islands of atypical squamous cells are noted at the biopsy   deep margin; additional sampling is   recommended for further evaluation and management.   I have personally reviewed all specimens and/or slides, including the   listed special stains, and used them   with my medical judgement to determine or confirm the final diagnosis.     Electronically signed out by:     Aung Candelario M.D., PhD, [facility]     CLINICAL HISTORY:   The patient is a 68-year-old male     GROSS:   A: The specimen is received in formalin with proper patient   identification, labeled \"R. forearm\" and the   specimen consists of a single, irregular skin shave measuring 0.8 x 0.7   cm.  The skin surface displays a 0.7 x   0.7 x 0.1 cm tan firm lesion.  The resection margin is inked black.  It is    serially sectioned and entirely   submitted in cassette A1.     B: The specimen is received in formalin with proper patient   identification, labeled \"L. per kilo or\" and the   specimen consists of a single, irregular skin shave measuring 1.2 x 1.0   cm.  The skin surface displays a 0.8 x   0.7 cm irregular tan lesion.  The resection margin is inked black.  It is   serially sectioned and entirely   submitted in cassette B1.     C: The specimen is received in formalin with proper patient   identification, labeled \"R. dorsal hand\" and the   specimen consists of a single, irregular skin shave measuring 0.8 x 0.7   cm.  The skin surface displays a 0.7 x   0.6 cm pink-tan lesion.  The resection margin is inked black.  It is   serially sectioned and entirely submitted   in cassette C1. (Dictated by: Kenyatta PRESLEY [facility] 9/25/2018 03:52 PM) "     MICROSCOPIC:   A. The specimen exhibits scale crust and an endophytic, undermining   proliferation of atypical squamous   epithelium with foci of keratinization and dermal islands of atypical   keratinocytes.     B. The specimen exhibits intermittent parakeratosis with follicular   sparing, and mild epidermal hyperplasia   with mild keratinocyte atypia accentuated in its lower half.     C. The specimen exhibits intermittent parakeratosis, mild epidermal   hyperplasia and focal mild keratinocyte   atypia in the lower half of the epidermis. Few dermal islands of atypical   squamous cells which do not appear   to connect to the epidermis are noted.

## 2018-10-08 ENCOUNTER — OFFICE VISIT (OUTPATIENT)
Dept: DERMATOLOGY | Facility: CLINIC | Age: 68
End: 2018-10-08
Payer: COMMERCIAL

## 2018-10-08 VITALS — SYSTOLIC BLOOD PRESSURE: 125 MMHG | DIASTOLIC BLOOD PRESSURE: 84 MMHG | HEART RATE: 50 BPM

## 2018-10-08 DIAGNOSIS — C44.622 SQUAMOUS CELL CANCER OF SKIN OF RIGHT FOREARM: Primary | ICD-10-CM

## 2018-10-08 DIAGNOSIS — D48.5 NEOPLASM OF UNCERTAIN BEHAVIOR OF SKIN: ICD-10-CM

## 2018-10-08 PROCEDURE — 99212 OFFICE O/P EST SF 10 MIN: CPT | Mod: 25 | Performed by: DERMATOLOGY

## 2018-10-08 PROCEDURE — 88305 TISSUE EXAM BY PATHOLOGIST: CPT | Mod: TC | Performed by: DERMATOLOGY

## 2018-10-08 PROCEDURE — 11603 EXC TR-EXT MAL+MARG 2.1-3 CM: CPT | Performed by: DERMATOLOGY

## 2018-10-08 PROCEDURE — 12032 INTMD RPR S/A/T/EXT 2.6-7.5: CPT | Performed by: DERMATOLOGY

## 2018-10-08 RX ORDER — TRAMADOL HYDROCHLORIDE 50 MG/1
50 TABLET ORAL EVERY 6 HOURS PRN
Qty: 10 TABLET | Refills: 0 | Status: SHIPPED | OUTPATIENT
Start: 2018-10-08 | End: 2019-01-30

## 2018-10-08 ASSESSMENT — PAIN SCALES - GENERAL: PAINLEVEL: NO PAIN (0)

## 2018-10-08 NOTE — MR AVS SNAPSHOT
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 I may experience altered sensation. Risks are excessive bleeding, infection, muscle weakness, thick (hypertrophic or keloidal) scar, and recurrence,. A second procedure may be recommended to obtain the best cosmetic or functional result.  Possible complications of any surgical procedure are bleeding, infection, scarring, alteration in skin color and sensation, muscle weakness in the area, wound dehiscence or seperation, or recurrence of the lesion or disease. On occasion, after healing, a secondary procedure or revision may be recommended in order to obtain the best cosmetic or functional result.   After your surgery, a pressure bandage will be placed over the area that has sutures. This will help prevent bleeding. Please follow these instructions, as they will help you to prevent complications as your wound heals.  For the First 48 hours After Surgery:  1. Leave the pressure bandage on and keep it dry. If it should come loose, you may retape it, but do not take it off.  2. Relax and take it easy. Do not do any vigorous exercise, heavy lifting, or bending forward. This could cause the wound to bleed.  3. Post-operative pain is usually mild. You may take plain or extra strength Tylenol every 4 hours as needed (do not take more than 4,000mg in one day). Do not take any medicine that contains aspirin, ibuprofen or motrin unless you have been recommended these by a doctor.  Avoid alcohol and vitamin E as these may increase  your tendency to bleed.  4. You may put an ice pack around the bandaged area for 20 minutes every 2-3 hours. This may help reduce swelling, bruising, and pain. Make sure the ice pack is waterproof so that the pressure bandage does not get wet.   5. You may see a small amount of drainage or blood on your pressure bandage. This is normal. However, if drainage or bleeding continues or saturates the bandage, you will need to apply firm pressure over the bandage with a washcloth for 15 minutes. If bleeding continues after applying pressure for 15 minutes then go to the nearest emergency room.  48 Hours After Surgery  Carefully remove the bandage and start daily wound care and dressing changes. You may also now shower and get the wound wet. Wash wound with a mild soap and water.  Use caution when washing the wound. Be gentle and do not let the forceful shower stream hit the wound directly.  PAT dry.  Daily Wound Care:  1. Wash wound with a mild soap and water.  Use caution when washing the wound, be gentle and do not let the forceful shower stream hit the wound directly.  2. PAT DRY.  3. Apply Vaseline (from a new container or tube) over the suture line with a Q-tip. It is very important to keep the wound continuously moist, as wounds heal best in a moist environment.  4.  Keep the site covered until sutures are removed, you can cover it with a Telfa (non-stick) dressing and tape or a band-aid.    5. If you are unable to keep wound covered, you must apply Vaseline every 2 - 3 hours (while awake) to ensure it is being kept moist for optimal healing. A dressing overnight is recommended to keep the area moist.   Call Us If:  1. You have pain that is not controlled with Tylenol.  2. You have signs or symptoms of an infection, such as: fever over 100 degrees F, redness, warmth, or foul-smelling or yellow/creamy drainage from the wound.  Who should I call with questions?    [facility]:  [phone]     [facility]: [phone]    For urgent needs outside of business hours call the [facility] at [phone] and ask for the dermatology resident on call              Follow-ups after your visit        Your next 10 appointments already scheduled     Dec 03, 2018  4:00 PM CST   Return Visit with Paulo Moore MD   [facility] ([facility])    [id number] [place] [id number] [phone]            Mar 26, 2019  7:45 AM CDT   Return Visit with Misty Staples MD   [facility] ([facility])    [address] [place] [id number] [phone]              Who to contact     If you have questions or need follow up information about today's clinic visit or your schedule please contact [facility] directly at [phone].  Normal or non-critical lab and imaging results will be communicated to you by [employer], letter or phone within 4 business days after the clinic has received the results. If you do not hear from us within 7 days, please contact the clinic through [employer] or phone. If you have a critical or abnormal lab result, we will notify you by phone as soon as possible.  Submit refill requests through [employer] or call your pharmacy and they will forward the refill request to us. Please allow 3 business days for your refill to be completed.          Additional Information About Your Visit        [facility]ahart Information     [employer] is an electronic gateway that provides easy, online access to your medical records. With [employer], you can request a clinic appointment, read your test results, renew a prescription or communicate with your care team.     To sign up for [employer] visit the website at [email or web address]   You will be asked to enter the access code listed below, as well as some personal information. Please follow the directions to create your  username and password.     Your access code is: [email or web address]  Expires: 2018 12:15 PM     Your access code will  in 90 days. If you need help or a new code, please contact your [facility] or call [phone] for assistance.        Care EveryWhere ID     This is your Care EveryWhere ID. This could be used by other organizations to access your [facility] medical records  [id number]        Your Vitals Were     Pulse                   50            Blood Pressure from Last 3 Encounters:   10/08/18 125/84   10/03/18 106/72   18 107/68    Weight from Last 3 Encounters:   18 79.4 kg (175 lb)   05/10/18 80.8 kg (178 lb 3.2 oz)   18 79.4 kg (175 lb)              We Performed the Following     Dermatological path order and indications          Today's Medication Changes          These changes are accurate as of 10/8/18  9:20 AM.  If you have any questions, ask your nurse or doctor.               Start taking these medicines.        Dose/Directions    traMADol 50 MG tablet   Commonly known as:  ULTRAM   Used for:  Squamous cell cancer of skin of right forearm        Dose:  50 mg   Take 1 tablet (50 mg) by mouth every 6 hours as needed for severe pain   Quantity:  10 tablet   Refills:  0            Where to get your medicines      Some of these will need a paper prescription and others can be bought over the counter.  Ask your nurse if you have questions.     Bring a paper prescription for each of these medications     traMADol 50 MG tablet               Information about OPIOIDS     PRESCRIPTION OPIOIDS: WHAT YOU NEED TO KNOW   We gave you an opioid (narcotic) pain medicine. It is important to manage your pain, but opioids are not always the best choice. You should first try all the other options your care team gave you. Take this medicine for as short a time (and as few doses) as possible.    Some activities can increase your pain, such as bandage changes or therapy  sessions. It may help to take your pain medicine 30 to 60 minutes before these activities. Reduce your stress by getting enough sleep, working on hobbies you enjoy and practicing relaxation or meditation. Talk to your care team about ways to manage your pain beyond prescription opioids.    These medicines have risks:    DO NOT drive when on new or higher doses of pain medicine. These medicines can affect your alertness and reaction times, and you could be arrested for driving under the influence (DUI). If you need to use opioids long-term, talk to your care team about driving.    DO NOT operate heavy machinery    DO NOT do any other dangerous activities while taking these medicines.    DO NOT drink any alcohol while taking these medicines.     If the opioid prescribed includes acetaminophen, DO NOT take with any other medicines that contain acetaminophen. Read all labels carefully. Look for the word  acetaminophen  or  Tylenol.  Ask your pharmacist if you have questions or are unsure.    You can get addicted to pain medicines, especially if you have a history of addiction (chemical, alcohol or substance dependence). Talk to your care team about ways to reduce this risk.    All opioids tend to cause constipation. Drink plenty of water and eat foods that have a lot of fiber, such as fruits, vegetables, prune juice, apple juice and high-fiber cereal. Take a laxative (Miralax, milk of magnesia, Colace, Senna) if you don t move your bowels at least every other day. Other side effects include upset stomach, sleepiness, dizziness, throwing up, tolerance (needing more of the medicine to have the same effect), physical dependence and slowed breathing.    Store your pills in a secure place, locked if possible. We will not replace any lost or stolen medicine. If you don t finish your medicine, please throw away (dispose) as directed by your pharmacist. The [place] Pollution Control Agency has more information about safe  disposal: [email or web address]://[email or web address]/living-green/managing-unwanted-medications         Primary Care Provider Office Phone # Fax #    Suzanne Torres [phone] [address]  [facility] [address]        Equal Access to Services     MIROSLAVA EM : Hadii aad ku hadasho Soomaali, waaxda luqadaha, qaybta kaalmada adeegyada, waxay carmellain hayrodolfon adeskylar nicolas laaguilar . So [facility] [phone].    ATENCIÓN: Si habla español, tiene a alcantar disposición servicios gratuitos de asistencia lingüística. Jose al [phone].    We comply with applicable federal civil rights laws and [place] laws. We do not discriminate on the basis of race, color, national origin, age, disability, sex, sexual orientation, or gender identity.            Thank you!     Thank you for choosing [facility]  for your care. Our goal is always to provide you with excellent care. Hearing back from our patients is one way we can continue to improve our services. Please take a few minutes to complete the written survey that you may receive in the mail after your visit with us. Thank you!             Your Updated Medication List - Protect others around you: Learn how to safely use, store and throw away your medicines at www.disposemymeds.org.          This list is accurate as of 10/8/18  9:20 AM.  Always use your most recent med list.                   Brand Name Dispense Instructions for use Diagnosis    CALCIUM + D PO      Take by mouth daily 1200 MG/1000 MG D3        diclofenac 1 % Gel topical gel    VOLTAREN    100 g    Apply 4 grams to knees up to four times daily using enclosed dosing card.    Pes anserine bursitis       metroNIDAZOLE 0.75 % cream    METROCREAM    45 g    Apply topically daily    Rosacea       ofloxacin 0.3 % ophthalmic solution    OCUFLOX          prednisoLONE acetate 1 % ophthalmic susp    PRED FORTE          simvastatin 20 MG tablet    ZOCOR    90 tablet    TAKE ONE TABLET BY  MOUTH AT BEDTIME    Hyperlipidemia LDL goal <130       traMADol 50 MG tablet    ULTRAM    10 tablet    Take 1 tablet (50 mg) by mouth every 6 hours as needed for severe pain    Squamous cell cancer of skin of right forearm       VALTREX PO      Take 1,000 mg by mouth 2 times daily

## 2018-10-08 NOTE — PROGRESS NOTES
DERMATOLOGY EXCISION PROCEDURE NOTE    Dermatology Problem List:  1. Family history of melanoma   2. SCC, right forearm, s/p excision 10/8/18  3. Actinic keratosis   4. Onychomycosis   - s/p terbinafine 250 mg fall 2015     NAME OF PROCEDURE: Excision intermediate layered linear closure  Staff surgeon: Paulo Moore DO  Scrub Nurse: Casise Ramirez LPN     PRE-OPERATIVE DIAGNOSIS:  Squamous cell carcinoma, well differentiated   POST-OPERATIVE DIAGNOSIS: same   FINAL EXCISION SIZE(DEFECT SIZE): 2.4 x 1.9 cm, with 4 mm margin   FINAL REPAIR LENGTH: 3.5 cm     INDICATIONS: This patient presented with a 1.6 x 1.1 cm squamous cell carcinoma of the right forearm. Excision was indicated. We discussed the principles of treatment and most likely complications including scarring, bleeding, infection, incomplete excision, wound dehiscence, pain, nerve damage, and recurrence. Informed consent was obtained and the patient underwent the procedure as follows:    PROCEDURE: The patient was taken to the operative suite. Time-out was performed.  The treatment area was anesthetized with 1% lidocaine and epinephrine (1:100,000). The area was prepped with Chlorhexidine and rinsed with sterile saline and draped with sterile towels. The lesion was delineated and excised down to subcutaneous fat in a elliptical manner. Hemostasis was obtained by electrocoagulation.     REPAIR: An intermediate layered linear closure was selected as the procedure which would maximally preserve both function and cosmesis.    After the excision of the tumor, the area was carefully undermined. Hemostasis was obtained with electrocoagulation.  Closure was oriented so that the wound was in the patient's natural skin tension lines. The subcutaneous and dermal layers were then closed with 4-0 Monocryl sutures. The epidermis was then carefully approximated along the length of the wound using 5-0 fast absorbing gut simple running sutures.     The final wound length  was 3.5 cm. A total of 9.5 ml of anesthesia was administered for all surgical sites. Estimated blood loss was less than 10 ml for all surgical sites. A sterile pressure dressing was applied and wound care instructions, with a written handout, were given. The patient was [facility] alert and ambulatory.    Follow-up in 8 weeks for wound evaluation.       Anatomic Pathology Results: pending        Additionally at the patient's last appointment, there was a HAK papule on his right dorsal hand re-biopsied. The results were suspicious for SCC and additional sampling was recommended. At today's visit, the biopsy wound is still healing. The patient feels like the bulk of the lesion was removed with the shave biopsy.     Exam  Male in no acute distress, answers questions appropriately.   Right dorsal hand, skin colored scaly papule with superficial ulceration.     A/P:  NUB - suspicious for SCC.   Patient would prefer to avoid another biopsy so soon.   With healing skin changes, biopsy today could be non specific.   Patient will return in 8 weeks. Plan for excisional biopsy.       Staff Involved:    Scribe Disclosure  I, Danis Zamora, am serving as a scribe to document services personally performed by Dr. Paulo Moore DO, based on data collection and the provider's statements to me.     Provider Disclosure:   The documentation recorded by the scribe accurately reflects the services I personally performed and the decisions made by me.    Paulo Moore DO    Department of Dermatology  [facility]: Phone: [phone], Fax:[phone]  [facility]: Phone: [phone], Fax: [phone]      Performed at:   [facility]    [address] [place] [address]

## 2018-10-08 NOTE — LETTER
10/8/2018         RE: Kumar Gonzalez  [address] [address]        Dear Colleague,    Thank you for referring your patient, Kumar Gonzalez, to the [facility]. Please see a copy of my visit note below.    DERMATOLOGY EXCISION PROCEDURE NOTE    Dermatology Problem List:  1. Family history of melanoma   2. SCC, right forearm, s/p excision 10/8/18  3. Actinic keratosis   4. Onychomycosis   - s/p terbinafine 250 mg fall 2015     NAME OF PROCEDURE: Excision intermediate layered linear closure  Staff surgeon: Paulo Moore DO  Scrub Nurse: Cassie Ramirez LPN     PRE-OPERATIVE DIAGNOSIS:  Squamous cell carcinoma, well differentiated   POST-OPERATIVE DIAGNOSIS: same   FINAL EXCISION SIZE(DEFECT SIZE): 2.4 x 1.9 cm, with 4 mm margin   FINAL REPAIR LENGTH: 3.5 cm     INDICATIONS: This patient presented with a 1.6 x 1.1 cm squamous cell carcinoma of the right forearm. Excision was indicated. We discussed the principles of treatment and most likely complications including scarring, bleeding, infection, incomplete excision, wound dehiscence, pain, nerve damage, and recurrence. Informed consent was obtained and the patient underwent the procedure as follows:    PROCEDURE: The patient was taken to the operative suite. Time-out was performed.  The treatment area was anesthetized with 1% lidocaine and epinephrine (1:100,000). The area was prepped with Chlorhexidine and rinsed with sterile saline and draped with sterile towels. The lesion was delineated and excised down to subcutaneous fat in a elliptical manner. Hemostasis was obtained by electrocoagulation.     REPAIR: An intermediate layered linear closure was selected as the procedure which would maximally preserve both function and cosmesis.    After the excision of the tumor, the area was carefully undermined. Hemostasis was obtained with electrocoagulation.  Closure was oriented so that the wound was in the patient's natural skin  tension lines. The subcutaneous and dermal layers were then closed with 4-0 Monocryl sutures. The epidermis was then carefully approximated along the length of the wound using 5-0 fast absorbing gut simple running sutures.     The final wound length was 3.5 cm. A total of 9.5 ml of anesthesia was administered for all surgical sites. Estimated blood loss was less than 10 ml for all surgical sites. A sterile pressure dressing was applied and wound care instructions, with a written handout, were given. The patient was discharged from the [facility] alert and ambulatory.    Follow-up in 8 weeks for wound evaluation.       Anatomic Pathology Results: pending        Additionally at the patient's last appointment, there was a HAK papule on his right dorsal hand re-biopsied. The results were suspicious for SCC and additional sampling was recommended. At today's visit, the biopsy wound is still healing. The patient feels like the bulk of the lesion was removed with the shave biopsy.     Exam  Male in no acute distress, answers questions appropriately.   Right dorsal hand, skin colored scaly papule with superficial ulceration.     A/P:  NUB - suspicious for SCC.   Patient would prefer to avoid another biopsy so soon.   With healing skin changes, biopsy today could be non specific.   Patient will return in 8 weeks. Plan for excisional biopsy.       Staff Involved:    Scribe Disclosure  I, Danis Zamora, am serving as a scribe to document services personally performed by Dr. Paulo Moore DO, based on data collection and the provider's statements to me.     Provider Disclosure:   The documentation recorded by the scribe accurately reflects the services I personally performed and the decisions made by me.    Paulo Moore DO    Department of Dermatology  [facility]: Phone: [phone], Fax:[phone]  [facility]  [facility]: Phone: [phone], Fax: [phone]      Performed at:   [facility]    [address] [place] [address]                Again, thank you for allowing me to participate in the care of your patient.        Sincerely,        Paulo Moore MD

## 2018-10-08 NOTE — PATIENT INSTRUCTIONS

## 2018-10-08 NOTE — NURSING NOTE
Kumar Gonzalez's goals for this visit include:   Chief Complaint   Patient presents with     Procedure       He requests these members of his care team be copied on today's visit information: yes    PCP: Suzanne Torres    Referring Provider:  No referring provider defined for this encounter.    /84  Pulse 50    Do you need any medication refills at today's visit? No     Amorrmargie Shukla CMA

## 2018-10-10 ENCOUNTER — OFFICE VISIT (OUTPATIENT)
Dept: DERMATOLOGY | Facility: CLINIC | Age: 68
End: 2018-10-10
Payer: COMMERCIAL

## 2018-10-10 DIAGNOSIS — G89.18 POST-OPERATIVE PAIN: Primary | ICD-10-CM

## 2018-10-10 PROCEDURE — 99024 POSTOP FOLLOW-UP VISIT: CPT | Performed by: DERMATOLOGY

## 2018-10-10 PROCEDURE — 87186 SC STD MICRODIL/AGAR DIL: CPT | Performed by: DERMATOLOGY

## 2018-10-10 PROCEDURE — 87077 CULTURE AEROBIC IDENTIFY: CPT | Performed by: DERMATOLOGY

## 2018-10-10 PROCEDURE — 87070 CULTURE OTHR SPECIMN AEROBIC: CPT | Performed by: DERMATOLOGY

## 2018-10-10 RX ORDER — SULFAMETHOXAZOLE/TRIMETHOPRIM 800-160 MG
1 TABLET ORAL 2 TIMES DAILY
Qty: 14 TABLET | Refills: 0 | Status: SHIPPED | OUTPATIENT
Start: 2018-10-10 | End: 2019-01-30

## 2018-10-10 NOTE — PROGRESS NOTES
[facility] Dermatology Post-operative Visit note:  Subjective: The patient follows up for a wound check after undergoing excision to remove a well differentiated squamous cell carcinoma from the right forearm on 10/8/18. The patient says he has been experiencing more post-op pain than expected. He says opening his right hand is painful. The site itself is the most painful to touch. He reports some seepage of blood between dressing changes. Denies pus. He has been taking pain medication for the past 24 hours. Denies fever. The patient is otherwise feeling well. There are no other skin concerns at this time.  Objective: On exam, there is an appropriately healing surgical site on the right forearm with no dehiscence, no excessive erythema, no palpable fluid shift. Hemorrhagic crust present under petroleum jelly. Radial pulse strong. Edema. Tender to palpation.   Assessment and Plan: Healing surgical site with signs of early infection     Pain is a sensitive marker for early infection. Will start Bactrim DS x7 days. No clinical signs of an organized abscess.     No pus expressed with palpation.     Bacterial swab collected from right forearm. Patient will be notified with results.     Instructed to continue daily dressing changes and application of petroleum jelly until healed over with new pink skin and all sutures are dissolved.     Continue periodic self skin exams and report of any new or changing lesions.     Please refer to previous clinic note for details regarding treatment.  Follow up in 1 week to recheck wound.     Staff:    Scribe Disclosure  I, Danis You, am serving as a scribe to document services personally performed by Dr. Paulo Moore DO, based on data collection and the provider's statements to me.     Provider Disclosure:   The documentation recorded by the scribe accurately reflects the services I personally performed and the decisions made by me.    Paulo Moore      Department of Dermatology  [facility]: Phone: [phone], Fax:[phone]  [facility]: Phone: [phone], Fax: [phone]

## 2018-10-10 NOTE — PATIENT INSTRUCTIONS

## 2018-10-10 NOTE — MR AVS SNAPSHOT
After Visit Summary   10/10/2018    Kumar Gonzalez    MRN: [id number]           Patient Information     Date Of Birth          1950        Visit Information        Provider Department      10/10/2018 9:30 AM Paulo Moore MD [facility]        Today's Diagnoses     Post-operative pain    -  1      Care Instructions    Excision Wound Care Instructions  I will experience scar, altered skin color, bleeding, swelling, pain, crusting and redness. I may experience altered sensation. Risks are excessive bleeding, infection, muscle weakness, thick (hypertrophic or keloidal) scar, and recurrence,. A second procedure may be recommended to obtain the best cosmetic or functional result.  Possible complications of any surgical procedure are bleeding, infection, scarring, alteration in skin color and sensation, muscle weakness in the area, wound dehiscence or seperation, or recurrence of the lesion or disease. On occasion, after healing, a secondary procedure or revision may be recommended in order to obtain the best cosmetic or functional result.   After your surgery, a pressure bandage will be placed over the area that has sutures. This will help prevent bleeding. Please follow these instructions, as they will help you to prevent complications as your wound heals.  For the First 48 hours After Surgery:  1. Leave the pressure bandage on and keep it dry. If it should come loose, you may retape it, but do not take it off.  2. Relax and take it easy. Do not do any vigorous exercise, heavy lifting, or bending forward. This could cause the wound to bleed.  3. Post-operative pain is usually mild. You may take plain or extra strength Tylenol every 4 hours as needed (do not take more than 4,000mg in one day). Do not take any medicine that contains aspirin, ibuprofen or motrin unless you have been recommended these by a doctor.  Avoid alcohol and vitamin E as these may increase your tendency to  bleed.  4. You may put an ice pack around the bandaged area for 20 minutes every 2-3 hours. This may help reduce swelling, bruising, and pain. Make sure the ice pack is waterproof so that the pressure bandage does not get wet.   5. You may see a small amount of drainage or blood on your pressure bandage. This is normal. However, if drainage or bleeding continues or saturates the bandage, you will need to apply firm pressure over the bandage with a washcloth for 15 minutes. If bleeding continues after applying pressure for 15 minutes then go to the nearest emergency room.  48 Hours After Surgery  Carefully remove the bandage and start daily wound care and dressing changes. You may also now shower and get the wound wet. Wash wound with a mild soap and water.  Use caution when washing the wound. Be gentle and do not let the forceful shower stream hit the wound directly.  PAT dry.  Daily Wound Care:  1. Wash wound with a mild soap and water.  Use caution when washing the wound, be gentle and do not let the forceful shower stream hit the wound directly.  2. PAT DRY.  3. Apply Vaseline (from a new container or tube) over the suture line with a Q-tip. It is very important to keep the wound continuously moist, as wounds heal best in a moist environment.  4.  Keep the site covered until sutures are removed, you can cover it with a Telfa (non-stick) dressing and tape or a band-aid.    5. If you are unable to keep wound covered, you must apply Vaseline every 2 - 3 hours (while awake) to ensure it is being kept moist for optimal healing. A dressing overnight is recommended to keep the area moist.   Call Us If:  1. You have pain that is not controlled with Tylenol.  2. You have signs or symptoms of an infection, such as: fever over 100 degrees F, redness, warmth, or foul-smelling or yellow/creamy drainage from the wound.  Who should I call with questions?    [facility]: [phone]      [facility]: [phone]    For urgent needs outside of business hours call the [facility] at [phone] and ask for the dermatology resident on call              Follow-ups after your visit        Your next 10 appointments already scheduled     Oct 15, 2018  3:00 PM CDT   [facility] with Paulo Moore MD   [facility]    [address] [place] [address] [id number] [phone]            Dec 03, 2018  4:00 PM CST   Return Visit with Paulo Moore MD   [facility] ([facility]    [address] [place] [address] [phone]            Mar 26, 2019  7:45 AM CDT   Return Visit with Misty Staples MD   [facility] ([facility]    [address] [place] [id number] [phone]              Who to contact     If you have questions or need follow up information about today's clinic visit or your schedule please contact [facility] directly at [phone].  Normal or non-critical lab and imaging results will be communicated to you by MyChart, letter or phone within 4 business days after the clinic has received the results. If you do not hear from us within 7 days, please contact the clinic through Ebook Gluehart or phone. If you have a critical or abnormal lab result, we will notify you by phone as soon as possible.  Submit refill requests through Daily Aisle or call your pharmacy and they will forward the refill request to us. Please allow 3 business days for your refill to be completed.          Additional Information About Your Visit        Daily Aisle Information     Daily Aisle is an electronic gateway that provides easy, online access to your medical records. With Daily Aisle, you can request a clinic appointment, read your test results, renew a prescription or communicate with your care team.     To sign up for Daily Aisle  visit the website at [email or web address]   You will be asked to enter the access code listed below, as well as some personal information. Please follow the directions to create your username and password.     Your access code is: [email or web address]  Expires: 2018 12:15 PM     Your access code will  in 90 days. If you need help or a new code, please contact your [facility] or call [phone] for assistance.        Care EveryWhere ID     This is your Care EveryWhere ID. This could be used by other organizations to access your [facility] medical records  [id number]         Blood Pressure from Last 3 Encounters:   10/08/18 125/84   10/03/18 106/72   18 107/68    Weight from Last 3 Encounters:   18 79.4 kg (175 lb)   05/10/18 80.8 kg (178 lb 3.2 oz)   18 79.4 kg (175 lb)              We Performed the Following     Wound Culture Aerobic Bacterial          Today's Medication Changes          These changes are accurate as of 10/10/18 10:02 AM.  If you have any questions, ask your nurse or doctor.               Start taking these medicines.        Dose/Directions    sulfamethoxazole-trimethoprim 800-160 MG per tablet   Commonly known as:  BACTRIM DS   Used for:  Post-operative pain        Dose:  1 tablet   Take 1 tablet by mouth 2 times daily for 7 days   Quantity:  14 tablet   Refills:  0            Where to get your medicines      These medications were sent to [id number] - [place], [place] - [address]  [address], [facility] [address]     Phone:  [phone]     sulfamethoxazole-trimethoprim 800-160 MG per tablet                Primary Care Provider Office Phone # Fax #    Suzanne Torres [phone] [address] [facility] [address]  [facility] [address]        Equal Access to Services     MIROSLAVA EM AH: Hadii aad ku hadasho Soluz, waaxda luqadaha, qaybta zoraallisa calderon, raine connor  fidencio mcneillaabren ah. So [facility] [phone].    ATENCIÓN: Si sid english, tiene a alcantar disposición servicios gratuitos de asistencia lingüística. Jose ga [phone].    We comply with applicable federal civil rights laws and [place] laws. We do not discriminate on the basis of race, color, national origin, age, disability, sex, sexual orientation, or gender identity.            Thank you!     Thank you for choosing [facility]  for your care. Our goal is always to provide you with excellent care. Hearing back from our patients is one way we can continue to improve our services. Please take a few minutes to complete the written survey that you may receive in the mail after your visit with us. Thank you!             Your Updated Medication List - Protect others around you: Learn how to safely use, store and throw away your medicines at www.disposemymeds.org.          This list is accurate as of 10/10/18 10:02 AM.  Always use your most recent med list.                   Brand Name Dispense Instructions for use Diagnosis    CALCIUM + D PO      Take by mouth daily 1200 MG/1000 MG D3        diclofenac 1 % Gel topical gel    VOLTAREN    100 g    Apply 4 grams to knees up to four times daily using enclosed dosing card.    Pes anserine bursitis       metroNIDAZOLE 0.75 % cream    METROCREAM    45 g    Apply topically daily    Rosacea       ofloxacin 0.3 % ophthalmic solution    OCUFLOX          prednisoLONE acetate 1 % ophthalmic susp    PRED FORTE          simvastatin 20 MG tablet    ZOCOR    90 tablet    TAKE ONE TABLET BY MOUTH AT BEDTIME    Hyperlipidemia LDL goal <130       sulfamethoxazole-trimethoprim 800-160 MG per tablet    BACTRIM DS    14 tablet    Take 1 tablet by mouth 2 times daily for 7 days    Post-operative pain       traMADol 50 MG tablet    ULTRAM    10 tablet    Take 1 tablet (50 mg) by mouth every 6 hours as needed for severe pain    Squamous cell cancer of skin of right forearm        VALTREX PO      Take 1,000 mg by mouth 2 times daily

## 2018-10-10 NOTE — LETTER
10/10/2018         RE: Kumar Gonzalez  [address]  [place] [address]        Dear Colleague,    Thank you for referring your patient, Kumar Gonzalez, to the [facility]. Please see a copy of my visit note below.    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 The patient says he has been experiencing more post-op pain than expected. He says opening his right hand is painful. The site itself is the most painful to touch. He reports some seepage of blood between dressing changes. Denies pus. He has been taking pain medication for the past 24 hours. Denies fever. The patient is otherwise feeling well. There are no other skin concerns at this time.  Objective: On exam, there is an appropriately healing surgical site on the right forearm with no dehiscence, no excessive erythema, no palpable fluid shift. Hemorrhagic crust present under petroleum jelly. Radial pulse strong. Edema. Tender to palpation.   Assessment and Plan: Healing surgical site with signs of early infection     Pain is a sensitive marker for early infection. Will start Bactrim DS x7 days. No clinical signs of an organized abscess.     No pus expressed with palpation.     Bacterial swab collected from right forearm. Patient will be notified with results.     Instructed to continue daily dressing changes and application of petroleum jelly until healed over with new pink skin and all sutures are dissolved.     Continue periodic self skin exams and report of any new or changing lesions.     Please refer to previous clinic note for details regarding treatment.  Follow up in 1 week to recheck wound.     Staff:    Scribe Disclosure  I, Danis You, am serving as a scribe to document services personally performed by Dr. Bates  DO Teresa, based on data collection and the provider's statements to me.     Provider Disclosure:   The documentation recorded by the scribe accurately reflects the services I personally performed and the decisions made by me.    Paulo Moore DO    Department of Dermatology  [facility]: Phone: [phone], Fax:[phone]  [facility]: Phone: [phone], Fax: [phone]          Again, thank you for allowing me to participate in the care of your patient.        Sincerely,        Paulo Moore MD

## 2018-10-12 LAB
BACTERIA SPEC CULT: ABNORMAL
COPATH REPORT: NORMAL
Lab: ABNORMAL
SPECIMEN SOURCE: ABNORMAL

## 2018-10-15 ENCOUNTER — OFFICE VISIT (OUTPATIENT)
Dept: DERMATOLOGY | Facility: CLINIC | Age: 68
End: 2018-10-15
Payer: COMMERCIAL

## 2018-10-15 DIAGNOSIS — B95.8 STAPH SKIN INFECTION: Primary | ICD-10-CM

## 2018-10-15 DIAGNOSIS — L08.9 STAPH SKIN INFECTION: Primary | ICD-10-CM

## 2018-10-15 PROCEDURE — 99024 POSTOP FOLLOW-UP VISIT: CPT | Performed by: DERMATOLOGY

## 2018-10-15 RX ORDER — SULFAMETHOXAZOLE/TRIMETHOPRIM 800-160 MG
1 TABLET ORAL 2 TIMES DAILY
Qty: 14 TABLET | Refills: 0 | Status: SHIPPED | OUTPATIENT
Start: 2018-10-15 | End: 2019-01-30

## 2018-10-15 RX ORDER — CEPHALEXIN 500 MG/1
500 CAPSULE ORAL
COMMUNITY
Start: 2018-10-12 | End: 2019-01-30

## 2018-10-15 RX ORDER — GENTAMICIN SULFATE 1 MG/G
OINTMENT TOPICAL
Qty: 30 G | Refills: 0 | Status: SHIPPED | OUTPATIENT
Start: 2018-10-15 | End: 2019-01-30

## 2018-10-15 NOTE — MR AVS SNAPSHOT
After Visit Summary   10/15/2018    Kumar Gonzalez    MRN: [id number]           Patient Information     Date Of Birth          1950        Visit Information        Provider Department      10/15/2018 3:00 PM Paulo Moore MD [facility]        Today's Diagnoses     Staph skin infection    -  1       Follow-ups after your visit        Your next 10 appointments already scheduled     Oct 22, 2018  4:00 PM CDT   Return Visit with Paulo Moore MD   [facility])    [address] [place] [address]   [phone]            Dec 03, 2018  4:00 PM CST   Return Visit with Paulo Moore MD   [facility])    [address] [place] [address]   [phone]            Mar 26, 2019  7:45 AM CDT   Return Visit with Misty Staples MD   [facility])    [address] [place] [address]   [phone]              Who to contact     If you have questions or need follow up information about today's clinic visit or your schedule please contact [facility] directly at [phone].  Normal or non-critical lab and imaging results will be communicated to you by MyChart, letter or phone within 4 business days after the clinic has received the results. If you do not hear from us within 7 days, please contact the clinic through MyChart or phone. If you have a critical or abnormal lab result, we will notify you by phone as soon as possible.  Submit refill requests through saambaa or call your pharmacy and they will forward the refill request to us. Please allow 3 business days for your refill to be completed.          Additional Information About Your Visit        ETARGETharSpotHero Information     saambaa is an electronic gateway that provides easy, online access to your medical records. With saambaa, you can request a  clinic appointment, read your test results, renew a prescription or communicate with your care team.     To sign up for ENTEROME Biosciencehart visit the website at [email or web address]   You will be asked to enter the access code listed below, as well as some personal information. Please follow the directions to create your username and password.     Your access code is: [email or web address]  Expires: 2018 12:15 PM     Your access code will  in 90 days. If you need help or a new code, please contact your [facility] or call [phone] for assistance.        Care EveryWhere ID     This is your Care EveryWhere ID. This could be used by other organizations to access your [facility] medical records  [id number]         Blood Pressure from Last 3 Encounters:   10/08/18 125/84   10/03/18 106/72   18 107/68    Weight from Last 3 Encounters:   18 79.4 kg (175 lb)   05/10/18 80.8 kg (178 lb 3.2 oz)   18 79.4 kg (175 lb)              Today, you had the following     No orders found for display         Today's Medication Changes          These changes are accurate as of 10/15/18  3:43 PM.  If you have any questions, ask your nurse or doctor.               Start taking these medicines.        Dose/Directions    gentamicin 0.1 % ointment   Commonly known as:  GARAMYCIN   Used for:  Staph skin infection        Apply a thin layer to your right arm wound twice daily   Quantity:  30 g   Refills:  0         These medicines have changed or have updated prescriptions.        Dose/Directions    * sulfamethoxazole-trimethoprim 800-160 MG per tablet   Commonly known as:  BACTRIM DS   This may have changed:  Another medication with the same name was added. Make sure you understand how and when to take each.   Used for:  Post-operative pain        Dose:  1 tablet   Take 1 tablet by mouth 2 times daily for 7 days   Quantity:  14 tablet   Refills:  0       * sulfamethoxazole-trimethoprim 800-160 MG  per tablet   Commonly known as:  BACTRIM DS   This may have changed:  You were already taking a medication with the same name, and this prescription was added. Make sure you understand how and when to take each.   Used for:  Staph skin infection        Dose:  1 tablet   Take 1 tablet by mouth 2 times daily for 7 days   Quantity:  14 tablet   Refills:  0       * Notice:  This list has 2 medication(s) that are the same as other medications prescribed for you. Read the directions carefully, and ask your doctor or other care provider to review them with you.         Where to get your medicines      These medications were sent to [facility] - [place], [place] - [address]  [address], [facility] [address]     Phone:  [phone]     gentamicin 0.1 % ointment    sulfamethoxazole-trimethoprim 800-160 MG per tablet                Primary Care Provider Office Phone # Fax #    Suzanne Torres [phone] [address] [facility] [address]  [facility] [address]        Equal Access to Services     [facility] AH: Hadii aad ku hadasho Soomaali, waaxda luqadaha, qaybta kaalmada adeegyada, waxyelitza fenton hayraysa pillai . So [facility] [phone].    ATENCIÓN: Si habla español, tiene a alcantar disposición servicios gratuitos de asistencia lingüística. Llame al [phone].    We comply with applicable federal civil rights laws and [place] laws. We do not discriminate on the basis of race, color, national origin, age, disability, sex, sexual orientation, or gender identity.            Thank you!     Thank you for choosing [facility]  for your care. Our goal is always to provide you with excellent care. Hearing back from our patients is one way we can continue to improve our services. Please take a few minutes to complete the written survey that you may receive in the mail after your visit with us. Thank you!             Your Updated Medication List - Protect  others around you: Learn how to safely use, store and throw away your medicines at www.disposemymeds.org.          This list is accurate as of 10/15/18  3:43 PM.  Always use your most recent med list.                   Brand Name Dispense Instructions for use Diagnosis    CALCIUM + D PO      Take by mouth daily 1200 MG/1000 MG D3        cephALEXin 500 MG capsule    KEFLEX     Take 500 mg by mouth        diclofenac 1 % Gel topical gel    VOLTAREN    100 g    Apply 4 grams to knees up to four times daily using enclosed dosing card.    Pes anserine bursitis       gentamicin 0.1 % ointment    GARAMYCIN    30 g    Apply a thin layer to your right arm wound twice daily    Staph skin infection       metroNIDAZOLE 0.75 % cream    METROCREAM    45 g    Apply topically daily    Rosacea       ofloxacin 0.3 % ophthalmic solution    OCUFLOX          prednisoLONE acetate 1 % ophthalmic susp    PRED FORTE          simvastatin 20 MG tablet    ZOCOR    90 tablet    TAKE ONE TABLET BY MOUTH AT BEDTIME    Hyperlipidemia LDL goal <130       * sulfamethoxazole-trimethoprim 800-160 MG per tablet    BACTRIM DS    14 tablet    Take 1 tablet by mouth 2 times daily for 7 days    Post-operative pain       * sulfamethoxazole-trimethoprim 800-160 MG per tablet    BACTRIM DS    14 tablet    Take 1 tablet by mouth 2 times daily for 7 days    Staph skin infection       traMADol 50 MG tablet    ULTRAM    10 tablet    Take 1 tablet (50 mg) by mouth every 6 hours as needed for severe pain    Squamous cell cancer of skin of right forearm       VALTREX PO      Take 1,000 mg by mouth 2 times daily        * Notice:  This list has 2 medication(s) that are the same as other medications prescribed for you. Read the directions carefully, and ask your doctor or other care provider to review them with you.

## 2018-10-15 NOTE — NURSING NOTE
Kumar Gonzalez's goals for this visit include:   Chief Complaint   Patient presents with     RECHECK     wound check        He requests these members of his care team be copied on today's visit information: yes     PCP: Suzanne Torres    Referring Provider:  No referring provider defined for this encounter.    There were no vitals taken for this visit.    Do you need any medication refills at today's visit? No     Amorrmargie Shukla CMA

## 2018-10-15 NOTE — LETTER
10/15/2018         RE: Kumar Gonzalez  [address] [place] [address]        Dear Colleague,    Thank you for referring your patient, Kumar Gonzalez, to the [facility]. Please see a copy of my visit note below.    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 At his wound check last week he had reported significant pain from the site after surgery. He called the emergency number we provide patients on Thursday night and sent pictures of his wound for them to review. They recommended he have the site checked, so the following day he went to urgent care where a plastic surgeon opened the wound and drained significant amounts of pus. He was instructed to clean and repack the wound daily. He was additionally prescribed Keflex 500 mg to take by mouth. He states that his pain levels seem to be improving. He does not experience as much pain anymore with opening his hand or bending his arm. The patient is otherwise feeling well. There are no other skin concerns at this time.  Objective: On exam, there is an appropriately healing surgical site on the right forearm with no dehiscence, no excessive erythema, no palpable fluid shift. Hemorrhagic crust present under petroleum jelly. Radial pulse strong. Tender to palpation. Packing removed from wound, minimal undermining visible.  Assessment and Plan: Appropriately healing surgical site    Bacterial culture showed heavy growth of staphylococcus aureus. Pt will continue Bactrim DS x7 days and gentamicin 0.1% ointment to apply daily.    Double covering staph with Keflex does not seem necessary.  Discontinue for single agent bactrim.     Instructed to continue daily dressing changes and application of petroleum jelly until healed over with new pink skin and  all sutures are dissolved.     Continue periodic self skin exams and report of any new or changing lesions.     Please refer to previous clinic note for details regarding treatment.  Follow up in 1 week to recheck wound.     Staff:    Scribe Disclosure  I, Danis You, am serving as a scribe to document services personally performed by Dr. Paulo Moroe DO, based on data collection and the provider's statements to me.     Provider Disclosure:   The documentation recorded by the scribe accurately reflects the services I personally performed and the decisions made by me.    Paulo Moore DO    Department of Dermatology  [facility]: Phone: [phone], Fax:[phone]  [facility]: Phone: [phone], Fax: [phone]    Again, thank you for allowing me to participate in the care of your patient.        Sincerely,        Paulo Moore MD

## 2018-10-15 NOTE — PROGRESS NOTES
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 At his wound check last week he had reported significant pain from the site after surgery. He called the emergency number we provide patients on Thursday night and sent pictures of his wound for them to review. They recommended he have the site checked, so the following day he went to urgent care where a plastic surgeon opened the wound and drained significant amounts of pus. He was instructed to clean and repack the wound daily. He was additionally prescribed Keflex 500 mg to take by mouth. He states that his pain levels seem to be improving. He does not experience as much pain anymore with opening his hand or bending his arm. The patient is otherwise feeling well. There are no other skin concerns at this time.  Objective: On exam, there is an appropriately healing surgical site on the right forearm with no dehiscence, no excessive erythema, no palpable fluid shift. Hemorrhagic crust present under petroleum jelly. Radial pulse strong. Tender to palpation. Packing removed from wound, minimal undermining visible.  Assessment and Plan: Appropriately healing surgical site    Bacterial culture showed heavy growth of staphylococcus aureus. Pt will continue Bactrim DS x7 days and gentamicin 0.1% ointment to apply daily.    Double covering staph with Keflex does not seem necessary.  Discontinue for single agent bactrim.     Instructed to continue daily dressing changes and application of petroleum jelly until healed over with new pink skin and all sutures are dissolved.     Continue periodic self skin exams and report of any new or changing lesions.     Please refer to previous clinic note for details regarding treatment.  Follow up in 1 week to recheck wound.     Staff:    Scribe  Disclosure  I, Danis You, am serving as a scribe to document services personally performed by Dr. Paulo Moore DO, based on data collection and the provider's statements to me.     Provider Disclosure:   The documentation recorded by the scribe accurately reflects the services I personally performed and the decisions made by me.    Paulo Moore DO    Department of Dermatology  [facility]: Phone: [phone], Fax:[phone]  [facility]: Phone: [phone], Fax: [phone]

## 2018-10-21 ENCOUNTER — TELEPHONE (OUTPATIENT)
Dept: DERMATOLOGY | Facility: CLINIC | Age: 68
End: 2018-10-21

## 2018-10-21 NOTE — TELEPHONE ENCOUNTER
Patient called today.    Patient needs to cancel appointment at [facility] tomorrow 2018 with Paulo Moore MD.    Patient will callback to re-schedule later.    Patient has a .    Thank you.    Central Scheduling  Melissa SEYMOUR

## 2018-11-06 ENCOUNTER — ALLIED HEALTH/NURSE VISIT (OUTPATIENT)
Dept: NURSING | Facility: CLINIC | Age: 68
End: 2018-11-06
Payer: COMMERCIAL

## 2018-11-06 DIAGNOSIS — Z51.89 VISIT FOR WOUND CHECK: Primary | ICD-10-CM

## 2018-11-06 PROCEDURE — 99207 ZZC NO CHARGE NURSE ONLY: CPT

## 2018-11-06 ASSESSMENT — PAIN SCALES - GENERAL: PAINLEVEL: NO PAIN (0)

## 2018-11-06 NOTE — MR AVS SNAPSHOT
After Visit Summary   11/6/2018    Kumar Gonzalez    MRN: [id number]           Patient Information     Date Of Birth          1950        Visit Information        Provider Department      11/6/2018 12:45 PM NURSE ONLY [facility]         Follow-ups after your visit        Your next 10 appointments already scheduled     Dec 03, 2018  4:00 PM CST   Return Visit with Paulo Moore MD   [facility] ([facility]    [address] [place] [id number]   [phone]            Mar 26, 2019  7:45 AM CDT   Return Visit with Misty Staples MD   [facility] ([facility]    [address] [address] [address] [phone]              Who to contact     If you have questions or need follow up information about today's clinic visit or your schedule please contact [facility] directly at [phone].  Normal or non-critical lab and imaging results will be communicated to you by MyChart, letter or phone within 4 business days after the clinic has received the results. If you do not hear from us within 7 days, please contact the clinic through MyChart or phone. If you have a critical or abnormal lab result, we will notify you by phone as soon as possible.  Submit refill requests through [employer] or call your pharmacy and they will forward the refill request to us. Please allow 3 business days for your refill to be completed.          Additional Information About Your Visit        Care EveryWhere ID     This is your Care EveryWhere ID. This could be used by other organizations to access your [place] medical records  [id number]         Blood Pressure from Last 3 Encounters:   10/08/18 125/84   10/03/18 106/72   09/17/18 107/68    Weight from Last 3 Encounters:   09/17/18 79.4 kg (175 lb)   05/10/18 80.8 kg (178 lb 3.2 oz)   04/30/18 79.4 kg (175 lb)              Today, you had the  following     No orders found for display       Primary Care Provider Office Phone # Fax #    Suzanne Torres [phone] [address]  [facility] [address]        Equal Access to Services     MIROSLAVA EM : Hadii valeri ku mamtao Soomaali, waaxda luqadaha, qaybta kaalmada adeskylaryada, raine carmellain hayaan yamilethskylar nicolas freya parkinson. So [facility] [phone].    ATENCIÓN: Si habla español, tiene a alcantar disposición servicios gratuitos de asistencia lingüística. Llame al [phone].    We comply with applicable federal civil rights laws and [place] laws. We do not discriminate on the basis of race, color, national origin, age, disability, sex, sexual orientation, or gender identity.            Thank you!     Thank you for choosing [facility]  for your care. Our goal is always to provide you with excellent care. Hearing back from our patients is one way we can continue to improve our services. Please take a few minutes to complete the written survey that you may receive in the mail after your visit with us. Thank you!             Your Updated Medication List - Protect others around you: Learn how to safely use, store and throw away your medicines at www.disposemymeds.org.          This list is accurate as of 11/6/18  1:07 PM.  Always use your most recent med list.                   Brand Name Dispense Instructions for use Diagnosis    CALCIUM + D PO      Take by mouth daily 1200 MG/1000 MG D3        diclofenac 1 % Gel topical gel    VOLTAREN    100 g    Apply 4 grams to knees up to four times daily using enclosed dosing card.    Pes anserine bursitis       gentamicin 0.1 % ointment    GARAMYCIN    30 g    Apply a thin layer to your right arm wound twice daily    Staph skin infection       metroNIDAZOLE 0.75 % cream    METROCREAM    45 g    Apply topically daily    Rosacea       ofloxacin 0.3 % ophthalmic solution    OCUFLOX          prednisoLONE acetate 1 % ophthalmic susp     PRED FORTE          simvastatin 20 MG tablet    ZOCOR    90 tablet    TAKE ONE TABLET BY MOUTH AT BEDTIME    Hyperlipidemia LDL goal <130       traMADol 50 MG tablet    ULTRAM    10 tablet    Take 1 tablet (50 mg) by mouth every 6 hours as needed for severe pain    Squamous cell cancer of skin of right forearm       VALTREX PO      Take 1,000 mg by mouth 2 times daily

## 2018-11-06 NOTE — NURSING NOTE
Kumar Gonzalez comes into clinic today at the request of Dr. Moore Ordering Provider for wound check.    Kumar is 29 days s/p excision of SCC of the right forearm.  He was being treated with antibiotics for a staph infection and has completed the course of Bactrim.  Kumar states the wound is doing much better.  He denies pain, bleeding, drainage or swelling.  He hadn't been applying gentamicin ointment or Vaseline.    On exam there is a well healed scar with crust present on the proximal edge.  There is a scant amount of blood on the bandage.  The wound edges are pink.  No odor, swelling or erythema present.  It is not tender to the touch.      The area was cleansed with Hibiclens and rinsed with sterile water.  Vaseline and Telfa dressing was applied.  Patient was encouraged to continue daily wound care.  He will follow up with Dr. Moore for wound check in 4 weeks.  I encouraged him to call with any questions or concerns about healing before then.  He verbalized understanding and agreed with the plan.    This service provided today was under the supervising provider of the day Dr. Staples, who was available if needed.    Aida Nelson RN

## 2018-12-03 ENCOUNTER — OFFICE VISIT (OUTPATIENT)
Dept: DERMATOLOGY | Facility: CLINIC | Age: 68
End: 2018-12-03
Payer: COMMERCIAL

## 2018-12-03 DIAGNOSIS — D48.5 NEOPLASM OF UNCERTAIN BEHAVIOR OF SKIN: Primary | ICD-10-CM

## 2018-12-03 DIAGNOSIS — L57.0 AK (ACTINIC KERATOSIS): ICD-10-CM

## 2018-12-03 PROCEDURE — 88305 TISSUE EXAM BY PATHOLOGIST: CPT | Mod: TC | Performed by: DERMATOLOGY

## 2018-12-03 PROCEDURE — 11100 HC BIOPSY SKIN/SUBQ/MUC MEM, SINGLE LESION: CPT | Mod: GC | Performed by: DERMATOLOGY

## 2018-12-03 PROCEDURE — 99212 OFFICE O/P EST SF 10 MIN: CPT | Mod: 25 | Performed by: DERMATOLOGY

## 2018-12-03 NOTE — MR AVS SNAPSHOT
After Visit Summary   12/3/2018    Kumar Gonzalez    MRN: [id number]           Patient Information     Date Of Birth          1950        Visit Information        Provider Department      12/3/2018 4:00 PM Paulo Moore MD [facility]        Today's Diagnoses     Neoplasm of uncertain behavior of skin    -  1      Care Instructions    Recommended products for scar:  - ScarAway Silicone sheets or ScarAway Silicone gel       Wound Care After a Biopsy    What is a skin biopsy?  A skin biopsy allows the doctor to examine a very small piece of tissue under the microscope to determine the diagnosis and the best treatment for the skin condition. A local anesthetic (numbing medicine)  is injected with a very small needle into the skin area to be tested. A small piece of skin is taken from the area. Sometimes a suture (stitch) is used.     What are the risks of a skin biopsy?  I will experience scar, bleeding, swelling, pain, crusting and redness. I may experience incomplete removal or recurrence. Risks of this procedure are excessive bleeding, bruising, infection, nerve damage, numbness, thick (hypertrophic or keloidal) scar and non-diagnostic biopsy.    How should I care for my wound for the first 24 hours?    Keep the wound dry and covered for 24 hours    If it bleeds, hold direct pressure on the area for 15 minutes. If bleeding does not stop then go to the emergency room    Avoid strenuous exercise the first 1-2 days or as your doctor instructs you    How should I care for the wound after 24 hours?    After 24 hours, remove the bandage    You may bathe or shower as normal    If you had a scalp biopsy, you can shampoo as usual and can use shower water to clean the biopsy site daily    Clean the wound twice a day with gentle soap and water    Do not scrub, be gentle    Apply white petroleum/Vaseline after cleaning the wound with a cotton swab or a clean finger, and keep the site covered  with a Bandaid /bandage. Bandages are not necessary with a scalp biopsy    If you are unable to cover the site with a Bandaid /bandage, re-apply ointment 2-3 times a day to keep the site moist. Moisture will help with healing    Avoid strenuous activity for first 1-2 days    Avoid lakes, rivers, pools, and oceans until the stitches are removed or the site is healed    How do I clean my wound?    Wash hands thoroughly with soap or use hand  before all wound care    Clean the wound with gentle soap and water    Apply white petroleum/Vaseline  to wound after it is clean    Replace the Bandaid /bandage to keep the wound covered for the first few days or as instructed by your doctor    If you had a scalp biopsy, warm shower water to the area on a daily basis should suffice    What should I use to clean my wound?     Cotton-tipped applicators (Qtips )    White petroleum jelly (Vaseline ). Use a clean new container and use Q-tips to apply.    Bandaids   as needed    Gentle soap     How should I care for my wound long term?    Do not get your wound dirty    Keep up with wound care for one week or until the area is healed.    A small scab will form and fall off by itself when the area is completely healed. The area will be red and will become pink in color as it heals. Sun protection is very important for how your scar will turn out. Sunscreen with an SPF 30 or greater is recommended once the area is healed.    You should have some soreness but it should be mild and slowly go away over several days. Talk to your doctor about using tylenol for pain,    When should I call my doctor?  If you have increased:     Pain or swelling    Pus or drainage (clear or slightly yellow drainage is ok)    Temperature over 100F    Spreading redness or warmth around wound    When will I hear about my results?  The biopsy results can take 2-3 weeks to come back. The clinic will call you with the results, send you a Sensser message, or  have you schedule a follow-up clinic or phone time to discuss the results. Contact our clinics if you do not hear from us in 3 weeks.     Who should I call with questions?    [facility]: [phone]     [facility]: [phone]    For urgent needs outside of business hours call the [facility] at [phone] and ask for the dermatology resident on call              Follow-ups after your visit        Your next 10 appointments already scheduled     Dec 03, 2018  4:00 PM CST   Return Visit with Paulo Moore MD   [email or web address] ([email or web address]    [address] [place] [address] [phone]            Mar 26, 2019  7:45 AM CDT   Return Visit with Misty Staples MD   [email or web address] ([email or web address]    [address] [place] [address] [phone]              Who to contact     If you have questions or need follow up information about today's clinic visit or your schedule please contact [facility] directly at [phone].  Normal or non-critical lab and imaging results will be communicated to you by MyChart, letter or phone within 4 business days after the clinic has received the results. If you do not hear from us within 7 days, please contact the clinic through MyChart or phone. If you have a critical or abnormal lab result, we will notify you by phone as soon as possible.  Submit refill requests through [employer] or call your pharmacy and they will forward the refill request to us. Please allow 3 business days for your refill to be completed.          Additional Information About Your Visit        Care EveryWhere ID     This is your Care EveryWhere ID. This could be used by other organizations to access your [place] medical records  [id number]         Blood Pressure from Last 3 Encounters:   10/08/18 125/84   10/03/18 106/72   09/17/18  107/68    Weight from Last 3 Encounters:   09/17/18 79.4 kg (175 lb)   05/10/18 80.8 kg (178 lb 3.2 oz)   04/30/18 79.4 kg (175 lb)              Today, you had the following     No orders found for display       Primary Care Provider Office Phone # Fax #    Suzanne Torres [phone]       [address]  [facility] [address]        Equal Access to Services     PONCHO EM : Hadii aad ku hadasho Soomaali, waaxda luqadaha, qaybta kaalmada adeegyada, waxay idiin hayaan adeeg kristiaraportia laaguilar . So [facility] [phone].    ATENCIÓN: Si habla español, tiene a alcantar disposición servicios gratuitos de asistencia lingüística. Llame al [phone].    We comply with applicable federal civil rights laws and [place] laws. We do not discriminate on the basis of race, color, national origin, age, disability, sex, sexual orientation, or gender identity.            Thank you!     Thank you for choosing [facility]  for your care. Our goal is always to provide you with excellent care. Hearing back from our patients is one way we can continue to improve our services. Please take a few minutes to complete the written survey that you may receive in the mail after your visit with us. Thank you!             Your Updated Medication List - Protect others around you: Learn how to safely use, store and throw away your medicines at www.disposemymeds.org.          This list is accurate as of 12/3/18  3:55 PM.  Always use your most recent med list.                   Brand Name Dispense Instructions for use Diagnosis    CALCIUM + D PO      Take by mouth daily 1200 MG/1000 MG D3        diclofenac 1 % topical gel    VOLTAREN    100 g    Apply 4 grams to knees up to four times daily using enclosed dosing card.    Pes anserine bursitis       gentamicin 0.1 % external ointment    GARAMYCIN    30 g    Apply a thin layer to your right arm wound twice daily    Staph skin infection       metroNIDAZOLE 0.75  % external cream    METROCREAM    45 g    Apply topically daily    Rosacea       ofloxacin 0.3 % ophthalmic solution    OCUFLOX          prednisoLONE acetate 1 % ophthalmic suspension    PRED FORTE          simvastatin 20 MG tablet    ZOCOR    90 tablet    TAKE ONE TABLET BY MOUTH AT BEDTIME    Hyperlipidemia LDL goal <130       traMADol 50 MG tablet    ULTRAM    10 tablet    Take 1 tablet (50 mg) by mouth every 6 hours as needed for severe pain    Squamous cell cancer of skin of right forearm       VALTREX PO      Take 1,000 mg by mouth 2 times daily

## 2018-12-03 NOTE — NURSING NOTE
Dermatology Rooming Note    Kumar Gonzalez's goals for this visit include:   Chief Complaint   Patient presents with     RECHECK     8 week follow up wrist and forearm        Is a scribe okay for this visit:No    Are records needed for this visit(If yes, obtain release of information): No     Vitals: There were no vitals taken for this visit.    Referring Provider:  No referring provider defined for this encounter.

## 2018-12-03 NOTE — PROGRESS NOTES
[facility] Dermatology Note    Dermatology Problem List:  1. Family history of melanoma   2. SCC, right forearm, s/p excision 10/8/18  3. Actinic keratosis   4. Onychomycosis   - s/p terbinafine 250 mg fall 2015   5. NUB, left lateral calf - pending bx results     Encounter Date: Dec 3, 2018    CC:  Chief Complaint   Patient presents with     RECHECK     8 week follow up wrist and forearm          History of Present Illness:  Mr. 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 At his wound check on 10/15/18 he continued Bactrim DS and gentamicin 0.1% ointment for bacterial infection. At today's visit, he states that the wound seems be completely healed at this point. There are no open areas remaining. He asks about how to keep the scar from getting too stiff or hard. Regarding a lesion that was being monitored on the back of his right hand, he has not seen any change or recurrence. He additionally reports a lesion on his left lower leg. He states that the lesion is not painful. The patient is otherwise feeling well. There are no other skin concerns at this time.      Past Medical History:   Patient Active Problem List   Diagnosis     Alopecia     Family history of stroke (cerebrovascular)     HYPERLIPIDEMIA LDL GOAL <130     Colon polyp     BPH (benign prostatic hyperplasia)     Loss of height     Erectile dysfunction     Impacted cerumen     Rosacea     Osteoporosis     Lentigo     Family history of melanoma     History of actinic keratosis     Xerosis cutis     Advanced directives, counseling/discussion     Vitamin D deficiency     KP (keratosis pilaris)     AK (actinic keratosis)     Anesthesia complication     Solar lentigo     Insomnia     Past Medical History:   Diagnosis Date     Actinic keratosis      ALOPECIA NOS 3/24/2008     BPH (benign prostatic  hyperplasia) 12/1/2010     Erectile dysfunction 12/1/2010     Hyperlipidemia LDL goal <130 10/31/2010     Seizure (H) 10/28/2008    after general anesthesia     Vitamin D deficiency 1/5/2012    Mild, noted 2011 and 2012     Past Surgical History:   Procedure Laterality Date     C APPENDECTOMY  8th Grade     CA ANESTH,CORNEAL TRANSPLANT  2008?     EYE SURGERY      iris surgery     HC PNEUMATIC RETINOPEXY  Multiple    Bilateral       Social History:  Social History     Social History     Marital status:      Spouse name: Jade Ramirez     Number of children: 2     Years of education: N/A     Occupational History      [employer]     Social History Main Topics     Smoking status: Never Smoker     Smokeless tobacco: Never Used     Alcohol use Yes      Comment: occasionaly     Drug use: No     Sexual activity: Yes     Partners: Female     Other Topics Concern      Service No     Blood Transfusions No     Caffeine Concern No     Occupational Exposure No     Hobby Hazards No     Sleep Concern No     Stress Concern No     Weight Concern No     Special Diet No     Back Care No     Exercise Yes     Seat Belt Yes     Self-Exams No     Social History Narrative    [employer]er       Family History:  Family History   Problem Relation Age of Onset     Cerebrovascular Disease Father      Diabetes Paternal Grandfather      Cancer Brother      all uncles, and brother. Unsure type     Cancer Other      cousin with melanoma     Asthma No family hx of      C.A.D. No family hx of      Hypertension No family hx of      Breast Cancer No family hx of      Cancer - colorectal No family hx of      Prostate Cancer No family hx of        Medications:  Current Outpatient Prescriptions   Medication Sig Dispense Refill     Calcium Carbonate-Vitamin D (CALCIUM + D PO) Take by mouth daily 1200 MG/1000 MG D3       diclofenac (VOLTAREN) 1 % GEL topical gel Apply 4 grams to knees up to four  times daily using enclosed dosing card. 100 g 1     gentamicin (GARAMYCIN) 0.1 % ointment Apply a thin layer to your right arm wound twice daily 30 g 0     metroNIDAZOLE (METROCREAM) 0.75 % cream Apply topically daily 45 g 11     ofloxacin (OCUFLOX) 0.3 % ophthalmic solution        prednisoLONE acetate (PRED FORTE) 1 % ophthalmic suspension        simvastatin (ZOCOR) 20 MG tablet TAKE ONE TABLET BY MOUTH AT BEDTIME  90 tablet 3     traMADol (ULTRAM) 50 MG tablet Take 1 tablet (50 mg) by mouth every 6 hours as needed for severe pain 10 tablet 0     ValACYclovir HCl (VALTREX PO) Take 1,000 mg by mouth 2 times daily          No Known Allergies    Review of Systems:  -Skin Establ Pt: The patient denies any new rash, pruritus, or lesions that are symptomatic, changing or bleeding, except as per HPI.  -Constitutional: The patient is feeling generally well.    Physical exam:  Vitals: There were no vitals taken for this visit.  GEN: This is a well developed, well-nourished male in no acute distress, in a pleasant mood.    SKIN: Focused examination of the right forearm, right dorsal hand, and left lower ankle was performed.  - Well healed pink to purple surgical scar on the right forearm without erythema, nodularity or telangiectasias.   - 5 mm pink firm papule with small cutaneous horn on the left lateral calf.   - Pink depressed macule with no scale or ulceration on the right dorsal hand.   - No other lesions of concern on areas examined.       Impression/Plan:  1. Appropriately healed surgical site, right forearm, s/p Mohs 10/8/18     No clinical evidence of SCC recurrence.     Discontinue wound care; okay to treat as normal skin.     Recommended ScarAway Silicone gel or sheets.     Sample provided of [employer] scar therapy gel.     Continue periodic skin exams and report of new or changing lesions.     Recommend sunscreens SPF #30 or greater, protective clothing and avoidance of tanning beds.    2. Neoplasm of uncertain  behavior - left lateral calf. DDx includes HAK vs SCC vs ISK vs other.      After discussion of benefits and risks including but not limited to bleeding, infection, scar, incomplete removal, recurrence, and non-diagnostic biopsy, written consent and photographs were obtained. The area was cleaned with isopropyl alcohol. 0.75 mL of 1% lidocaine with epinephrine was injected to obtain adequate anesthesia of the lesion on the left lateral calf. A shave biopsy was performed. Hemostasis was achieved with aluminium chloride. Vaseline and a sterile dressing were applied. The patient tolerated the procedure and no complications were noted. The patient was provided with verbal and written post care instructions.     3. Hypertrophic actinic keratoses - right dorsal hand     Previously biopsied x2, but lesion still present at deep margin.     Biopsy wound healed with depressed scar with no clinical signs or symptoms of SCC.     Patient will contact clinic for bx if papule, wound, or repeated scale occurs.     Follow-up pending biopsy results, otherwise as scheduled in March.        Staff Involved:    Scribe Disclosure  I, Danis You, am serving as a scribe to document services personally performed by Dr. Paulo Moore DO, based on data collection and the provider's statements to me.     Staff Physician Comments:   I saw and evaluated the patient with the resident (Dr. July Hernandez) and I agree with the assessment and plan.   I was physically present for all key portions of the procedure today. I was immediately available at all times.    Paulo Moore DO    Department of Dermatology  [facility]: Phone: [phone], Fax:[phone]  [facility] [place]: Phone: [phone], Fax: [phone]

## 2018-12-03 NOTE — LETTER
12/3/2018         RE: Kumar Gonzalez  [id number]  [place] [address]        Dear Colleague,    Thank you for referring your patient, Kumar Gonzalez, to the [facility]. Please see a copy of my visit note below.    [facility] Dermatology Note    Dermatology Problem List:  1. Family history of melanoma   2. SCC, right forearm, s/p excision 10/8/18  3. Actinic keratosis   4. Onychomycosis   - s/p terbinafine 250 mg fall 2015   5. NUB, left lateral calf - pending bx results     Encounter Date: Dec 3, 2018    CC:  Chief Complaint   Patient presents with     RECHECK     8 week follow up wrist and forearm          History of Present Illness:  Mr. 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 At his wound check on 10/15/18 he continued Bactrim DS and gentamicin 0.1% ointment for bacterial infection. At today's visit, he states that the wound seems be completely healed at this point. There are no open areas remaining. He asks about how to keep the scar from getting too stiff or hard. Regarding a lesion that was being monitored on the back of his right hand, he has not seen any change or recurrence. He additionally reports a lesion on his left lower leg. He states that the lesion is not painful. The patient is otherwise feeling well. There are no other skin concerns at this time.      Past Medical History:   Patient Active Problem List   Diagnosis     Alopecia     Family history of stroke (cerebrovascular)     HYPERLIPIDEMIA LDL GOAL <130     Colon polyp     BPH (benign prostatic hyperplasia)     Loss of height     Erectile dysfunction     Impacted cerumen     Rosacea     Osteoporosis     Lentigo     Family history of melanoma     History of actinic keratosis     Xerosis cutis     Advanced directives, counseling/discussion     Vitamin D  deficiency     KP (keratosis pilaris)     AK (actinic keratosis)     Anesthesia complication     Solar lentigo     Insomnia     Past Medical History:   Diagnosis Date     Actinic keratosis      ALOPECIA NOS 3/24/2008     BPH (benign prostatic hyperplasia) 12/1/2010     Erectile dysfunction 12/1/2010     Hyperlipidemia LDL goal <130 10/31/2010     Seizure (H) 10/28/2008    after general anesthesia     Vitamin D deficiency 1/5/2012    Mild, noted 2011 and 2012     Past Surgical History:   Procedure Laterality Date     C APPENDECTOMY  8th Grade     CA ANESTH,CORNEAL TRANSPLANT  2008?     EYE SURGERY      iris surgery     HC PNEUMATIC RETINOPEXY  Multiple    Bilateral       Social History:  Social History     Social History     Marital status:      Spouse name: Jade Ramirez     Number of children: 2     Years of education: N/A     Occupational History      [employer]     Social History Main Topics     Smoking status: Never Smoker     Smokeless tobacco: Never Used     Alcohol use Yes      Comment: occasionaly     Drug use: No     Sexual activity: Yes     Partners: Female     Other Topics Concern      Service No     Blood Transfusions No     Caffeine Concern No     Occupational Exposure No     Hobby Hazards No     Sleep Concern No     Stress Concern No     Weight Concern No     Special Diet No     Back Care No     Exercise Yes     Seat Belt Yes     Self-Exams No     Social History Narrative    Real-estate developer       Family History:  Family History   Problem Relation Age of Onset     Cerebrovascular Disease Father      Diabetes Paternal Grandfather      Cancer Brother      all uncles, and brother. Unsure type     Cancer Other      cousin with melanoma     Asthma No family hx of      C.A.D. No family hx of      Hypertension No family hx of      Breast Cancer No family hx of      Cancer - colorectal No family hx of      Prostate Cancer No family hx of         Medications:  Current Outpatient Prescriptions   Medication Sig Dispense Refill     Calcium Carbonate-Vitamin D (CALCIUM + D PO) Take by mouth daily 1200 MG/1000 MG D3       diclofenac (VOLTAREN) 1 % GEL topical gel Apply 4 grams to knees up to four times daily using enclosed dosing card. 100 g 1     gentamicin (GARAMYCIN) 0.1 % ointment Apply a thin layer to your right arm wound twice daily 30 g 0     metroNIDAZOLE (METROCREAM) 0.75 % cream Apply topically daily 45 g 11     ofloxacin (OCUFLOX) 0.3 % ophthalmic solution        prednisoLONE acetate (PRED FORTE) 1 % ophthalmic suspension        simvastatin (ZOCOR) 20 MG tablet TAKE ONE TABLET BY MOUTH AT BEDTIME  90 tablet 3     traMADol (ULTRAM) 50 MG tablet Take 1 tablet (50 mg) by mouth every 6 hours as needed for severe pain 10 tablet 0     ValACYclovir HCl (VALTREX PO) Take 1,000 mg by mouth 2 times daily          No Known Allergies    Review of Systems:  -Skin Establ Pt: The patient denies any new rash, pruritus, or lesions that are symptomatic, changing or bleeding, except as per HPI.  -Constitutional: The patient is feeling generally well.    Physical exam:  Vitals: There were no vitals taken for this visit.  GEN: This is a well developed, well-nourished male in no acute distress, in a pleasant mood.    SKIN: Focused examination of the right forearm, right dorsal hand, and left lower ankle was performed.  - Well healed pink to purple surgical scar on the right forearm without erythema, nodularity or telangiectasias.   - 5 mm pink firm papule with small cutaneous horn on the left lateral calf.   - Pink depressed macule with no scale or ulceration on the right dorsal hand.   - No other lesions of concern on areas examined.       Impression/Plan:  1. Appropriately healed surgical site, right forearm, s/p Mohs 10/8/18     No clinical evidence of SCC recurrence.     Discontinue wound care; okay to treat as normal skin.     Recommended ScarAway Silicone gel or  sheets.     Sample provided of [employer] scar therapy gel.     Continue periodic skin exams and report of new or changing lesions.     Recommend sunscreens SPF #30 or greater, protective clothing and avoidance of tanning beds.    2. Neoplasm of uncertain behavior - left lateral calf. DDx includes HAK vs SCC vs ISK vs other.      After discussion of benefits and risks including but not limited to bleeding, infection, scar, incomplete removal, recurrence, and non-diagnostic biopsy, written consent and photographs were obtained. The area was cleaned with isopropyl alcohol. 0.75 mL of 1% lidocaine with epinephrine was injected to obtain adequate anesthesia of the lesion on the left lateral calf. A shave biopsy was performed. Hemostasis was achieved with aluminium chloride. Vaseline and a sterile dressing were applied. The patient tolerated the procedure and no complications were noted. The patient was provided with verbal and written post care instructions.     3. Hypertrophic actinic keratoses - right dorsal hand     Previously biopsied x2, but lesion still present at deep margin.     Biopsy wound healed with depressed scar with no clinical signs or symptoms of SCC.     Patient will contact clinic for bx if papule, wound, or repeated scale occurs.     Follow-up pending biopsy results, otherwise as scheduled in March.        Staff Involved:    Scribe Disclosure  I, Danis You, am serving as a scribe to document services personally performed by Dr. Paulo Moore DO, based on data collection and the provider's statements to me.     Staff Physician Comments:   I saw and evaluated the patient with the resident (Dr. July Hernandez) and I agree with the assessment and plan.   I was physically present for all key portions of the procedure today. I was immediately available at all times.    Paulo Moore DO    Department of Dermatology  [facility]: Phone: [phone], Fax:[phone]  [facility]: Phone: [phone], Fax: [phone]    Again, thank you for allowing me to participate in the care of your patient.        Sincerely,        Paulo Moore MD

## 2018-12-03 NOTE — PATIENT INSTRUCTIONS
Recommended products for scar:  - ScarAway Silicone sheets or ScarAway Silicone gel       Wound Care After a Biopsy    What is a skin biopsy?  A skin biopsy allows the doctor to examine a very small piece of tissue under the microscope to determine the diagnosis and the best treatment for the skin condition. A local anesthetic (numbing medicine)  is injected with a very small needle into the skin area to be tested. A small piece of skin is taken from the area. Sometimes a suture (stitch) is used.     What are the risks of a skin biopsy?  I will experience scar, bleeding, swelling, pain, crusting and redness. I may experience incomplete removal or recurrence. Risks of this procedure are excessive bleeding, bruising, infection, nerve damage, numbness, thick (hypertrophic or keloidal) scar and non-diagnostic biopsy.    How should I care for my wound for the first 24 hours?    Keep the wound dry and covered for 24 hours    If it bleeds, hold direct pressure on the area for 15 minutes. If bleeding does not stop then go to the emergency room    Avoid strenuous exercise the first 1-2 days or as your doctor instructs you    How should I care for the wound after 24 hours?    After 24 hours, remove the bandage    You may bathe or shower as normal    If you had a scalp biopsy, you can shampoo as usual and can use shower water to clean the biopsy site daily    Clean the wound twice a day with gentle soap and water    Do not scrub, be gentle    Apply white petroleum/Vaseline after cleaning the wound with a cotton swab or a clean finger, and keep the site covered with a Bandaid /bandage. Bandages are not necessary with a scalp biopsy    If you are unable to cover the site with a Bandaid /bandage, re-apply ointment 2-3 times a day to keep the site moist. Moisture will help with healing    Avoid strenuous activity for first 1-2 days    Avoid lakes, rivers, pools, and oceans until the stitches are removed or the site is  healed    How do I clean my wound?    Wash hands thoroughly with soap or use hand  before all wound care    Clean the wound with gentle soap and water    Apply white petroleum/Vaseline  to wound after it is clean    Replace the Bandaid /bandage to keep the wound covered for the first few days or as instructed by your doctor    If you had a scalp biopsy, warm shower water to the area on a daily basis should suffice    What should I use to clean my wound?     Cotton-tipped applicators (Qtips )    White petroleum jelly (Vaseline ). Use a clean new container and use Q-tips to apply.    Bandaids   as needed    Gentle soap     How should I care for my wound long term?    Do not get your wound dirty    Keep up with wound care for one week or until the area is healed.    A small scab will form and fall off by itself when the area is completely healed. The area will be red and will become pink in color as it heals. Sun protection is very important for how your scar will turn out. Sunscreen with an SPF 30 or greater is recommended once the area is healed.    You should have some soreness but it should be mild and slowly go away over several days. Talk to your doctor about using tylenol for pain,    When should I call my doctor?  If you have increased:     Pain or swelling    Pus or drainage (clear or slightly yellow drainage is ok)    Temperature over 100F    Spreading redness or warmth around wound    When will I hear about my results?  The biopsy results can take 2-3 weeks to come back. The clinic will call you with the results, send you a S[employer]rat message, or have you schedule a follow-up clinic or phone time to discuss the results. Contact our clinics if you do not hear from us in 3 weeks.     Who should I call with questions?    [facility]: [phone]     [facility]: [phone]    For urgent needs outside of business hours call the [facility] at [phone] and ask for the dermatology resident on call

## 2018-12-07 LAB — COPATH REPORT: NORMAL

## 2019-01-30 ENCOUNTER — OFFICE VISIT (OUTPATIENT)
Dept: FAMILY MEDICINE | Facility: CLINIC | Age: 69
End: 2019-01-30
Payer: COMMERCIAL

## 2019-01-30 ENCOUNTER — TELEPHONE (OUTPATIENT)
Dept: FAMILY MEDICINE | Facility: CLINIC | Age: 69
End: 2019-01-30

## 2019-01-30 VITALS
TEMPERATURE: 97.7 F | HEART RATE: 72 BPM | RESPIRATION RATE: 18 BRPM | DIASTOLIC BLOOD PRESSURE: 70 MMHG | WEIGHT: 180.6 LBS | SYSTOLIC BLOOD PRESSURE: 118 MMHG | HEIGHT: 68 IN | OXYGEN SATURATION: 99 % | BODY MASS INDEX: 27.37 KG/M2

## 2019-01-30 DIAGNOSIS — Z01.818 PREOP GENERAL PHYSICAL EXAM: Primary | ICD-10-CM

## 2019-01-30 DIAGNOSIS — H61.22 IMPACTED CERUMEN OF LEFT EAR: ICD-10-CM

## 2019-01-30 DIAGNOSIS — R94.4 DECREASED GFR: ICD-10-CM

## 2019-01-30 DIAGNOSIS — C44.92 SCC (SQUAMOUS CELL CARCINOMA): ICD-10-CM

## 2019-01-30 DIAGNOSIS — N40.1 BENIGN PROSTATIC HYPERPLASIA WITH NOCTURIA: ICD-10-CM

## 2019-01-30 DIAGNOSIS — H54.7 DECREASED VISION: ICD-10-CM

## 2019-01-30 DIAGNOSIS — R35.1 BENIGN PROSTATIC HYPERPLASIA WITH NOCTURIA: ICD-10-CM

## 2019-01-30 LAB
ALBUMIN UR-MCNC: NEGATIVE MG/DL
ANION GAP SERPL CALCULATED.3IONS-SCNC: 3 MMOL/L (ref 3–14)
APPEARANCE UR: CLEAR
BILIRUB UR QL STRIP: NEGATIVE
BUN SERPL-MCNC: 24 MG/DL (ref 7–30)
CALCIUM SERPL-MCNC: 8.8 MG/DL (ref 8.5–10.1)
CHLORIDE SERPL-SCNC: 109 MMOL/L (ref 94–109)
CO2 SERPL-SCNC: 30 MMOL/L (ref 20–32)
COLOR UR AUTO: YELLOW
CREAT SERPL-MCNC: 1.2 MG/DL (ref 0.66–1.25)
CREAT UR-MCNC: 111 MG/DL
GFR SERPL CREATININE-BSD FRML MDRD: 62 ML/MIN/{1.73_M2}
GLUCOSE SERPL-MCNC: 80 MG/DL (ref 70–99)
GLUCOSE UR STRIP-MCNC: NEGATIVE MG/DL
HGB UR QL STRIP: NEGATIVE
KETONES UR STRIP-MCNC: NEGATIVE MG/DL
LEUKOCYTE ESTERASE UR QL STRIP: NEGATIVE
MICROALBUMIN UR-MCNC: <5 MG/L
MICROALBUMIN/CREAT UR: NORMAL MG/G CR (ref 0–17)
NITRATE UR QL: NEGATIVE
PH UR STRIP: 5.5 PH (ref 5–7)
POTASSIUM SERPL-SCNC: 5 MMOL/L (ref 3.4–5.3)
SODIUM SERPL-SCNC: 142 MMOL/L (ref 133–144)
SOURCE: NORMAL
SP GR UR STRIP: 1.02 (ref 1–1.03)
UROBILINOGEN UR STRIP-ACNC: 0.2 EU/DL (ref 0.2–1)

## 2019-01-30 PROCEDURE — 82043 UR ALBUMIN QUANTITATIVE: CPT | Performed by: FAMILY MEDICINE

## 2019-01-30 PROCEDURE — 99214 OFFICE O/P EST MOD 30 MIN: CPT | Performed by: FAMILY MEDICINE

## 2019-01-30 PROCEDURE — 81003 URINALYSIS AUTO W/O SCOPE: CPT | Performed by: FAMILY MEDICINE

## 2019-01-30 PROCEDURE — 80048 BASIC METABOLIC PNL TOTAL CA: CPT | Performed by: FAMILY MEDICINE

## 2019-01-30 PROCEDURE — 36415 COLL VENOUS BLD VENIPUNCTURE: CPT | Performed by: FAMILY MEDICINE

## 2019-01-30 ASSESSMENT — ANXIETY QUESTIONNAIRES
5. BEING SO RESTLESS THAT IT IS HARD TO SIT STILL: NOT AT ALL
IF YOU CHECKED OFF ANY PROBLEMS ON THIS QUESTIONNAIRE, HOW DIFFICULT HAVE THESE PROBLEMS MADE IT FOR YOU TO DO YOUR WORK, TAKE CARE OF THINGS AT HOME, OR GET ALONG WITH OTHER PEOPLE: NOT DIFFICULT AT ALL
3. WORRYING TOO MUCH ABOUT DIFFERENT THINGS: NOT AT ALL
1. FEELING NERVOUS, ANXIOUS, OR ON EDGE: NOT AT ALL
GAD7 TOTAL SCORE: 0
2. NOT BEING ABLE TO STOP OR CONTROL WORRYING: NOT AT ALL
7. FEELING AFRAID AS IF SOMETHING AWFUL MIGHT HAPPEN: NEARLY EVERY DAY
3. WORRYING TOO MUCH ABOUT DIFFERENT THINGS: NEARLY EVERY DAY
6. BECOMING EASILY ANNOYED OR IRRITABLE: NEARLY EVERY DAY
1. FEELING NERVOUS, ANXIOUS, OR ON EDGE: NEARLY EVERY DAY
2. NOT BEING ABLE TO STOP OR CONTROL WORRYING: NEARLY EVERY DAY
6. BECOMING EASILY ANNOYED OR IRRITABLE: NOT AT ALL
GAD7 TOTAL SCORE: 21
5. BEING SO RESTLESS THAT IT IS HARD TO SIT STILL: NEARLY EVERY DAY
7. FEELING AFRAID AS IF SOMETHING AWFUL MIGHT HAPPEN: NOT AT ALL

## 2019-01-30 ASSESSMENT — PATIENT HEALTH QUESTIONNAIRE - PHQ9
SUM OF ALL RESPONSES TO PHQ QUESTIONS 1-9: 0
5. POOR APPETITE OR OVEREATING: NOT AT ALL
5. POOR APPETITE OR OVEREATING: NEARLY EVERY DAY

## 2019-01-30 ASSESSMENT — MIFFLIN-ST. JEOR: SCORE: 1560.76

## 2019-01-30 ASSESSMENT — PAIN SCALES - GENERAL: PAINLEVEL: NO PAIN (0)

## 2019-01-30 NOTE — PROGRESS NOTES
[address] [address]  [phone]  Dept: [phone]    PRE-OP EVALUATION:  Today's date: 2019    Kumar Gonzalez (: 1950) presents for pre-operative evaluation assessment as requested by Dr. Hallman.  He requires evaluation and anesthesia risk assessment prior to undergoing surgery/procedure for treatment of LT eye Lens Placement .    Proposed Surgery/ Procedure:   Date of Surgery/ Procedure: 19  Time of Surgery/ Procedure: [facility]  Hospital/Surgical Facility: [facility]  Fax number for surgical facility: Please fax to both [phone] and [phone]  Primary Physician: Suzanne Torres  Type of Anesthesia Anticipated: to be determined    Patient has a Health Care Directive or Living Will:  NO    1. NO - Do you have a history of heart attack, stroke, stent, bypass or surgery on an artery in the head, neck, heart or legs?  2. NO - Do you ever have any pain or discomfort in your chest?  3. NO - Do you have a history of  Heart Failure?  4. NO - Are you troubled by shortness of breath when: walking on the level, up a slight hill or at night?  5. NO - Do you currently have a cold, bronchitis or other respiratory infection?  6. NO - Do you have a cough, shortness of breath or wheezing?  7. NO - Do you sometimes get pains in the calves of your legs when you walk?  8. NO - Do you or anyone in your family have previous history of blood clots?  9. NO - Do you or does anyone in your family have a serious bleeding problem such as prolonged bleeding following surgeries or cuts?  10. NO - Have you ever had problems with anemia or been told to take iron pills?  11. NO - Have you had any abnormal blood loss such as black, tarry or bloody stools, or abnormal vaginal bleeding?  12. NO - Have you ever had a blood transfusion?  13. NO - Have you or any of your relatives ever had problems with anesthesia?  14. NO - Do you have sleep apnea, excessive snoring  or daytime drowsiness?  15. NO - Do you have any prosthetic heart valves?  16. NO - Do you have prosthetic joints?  17. NO - Is there any chance that you may be pregnant?      HPI:     HPI related to upcoming procedure: Patient is having a lens placed in his left eye. 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 This was expected after his transplant. After this operation he will be fit for contacts and is expected to have full vision. He is unsure what kind of anesthesia he will have or where his surgery is being done, will call after this appointment.       See problem list for active medical problems.  Problems all longstanding and stable, except as noted/documented.  See ROS for pertinent symptoms related to these conditions.                                                                                                                                                          .    MEDICAL HISTORY:     Patient Active Problem List    Diagnosis Date Noted     Insomnia 05/07/2014     Priority: Medium     Solar lentigo 08/27/2013     Priority: Medium     Anesthesia complication 02/20/2013     Priority: Medium     Seizure after eyelid surgery in 2008.   Neuro w/u suggestive of medications used. See letter from Dr. Calderon 2009  Anesthesia since incident with no complications       AK (actinic keratosis) 09/24/2012     Priority: Medium     KP (keratosis pilaris) 07/20/2012     Priority: Medium     Vitamin D deficiency 01/05/2012     Priority: Medium     Mild, noted 2011 and 2012       Advanced directives, counseling/discussion 01/02/2012     Priority: Medium     Advance Directive Problem List Overview:   Name Relationship Phone    Primary Health Care Agent            Alternative Health Care Agent          Discussed advance care planning with patient; however, patient declined at this time. 1/2/2012          History of actinic  keratosis 12/07/2011     Priority: Medium     Xerosis cutis 12/07/2011     Priority: Medium     Lentigo 02/02/2011     Priority: Medium     Family history of melanoma 02/02/2011     Priority: Medium     Osteoporosis 01/19/2011     Priority: Medium     Spine, dexa 12/2010         Rosacea 12/02/2010     Priority: Medium     Colon polyp 12/01/2010     Priority: Medium     BPH (benign prostatic hyperplasia) 12/01/2010     Priority: Medium     Loss of height 12/01/2010     Priority: Medium     Erectile dysfunction 12/01/2010     Priority: Medium     Impacted cerumen 12/01/2010     Priority: Medium     HYPERLIPIDEMIA LDL GOAL <130 10/31/2010     Priority: Medium     Alopecia 03/24/2008     Priority: Medium     Problem list name updated by automated process. Provider to review       Family history of stroke (cerebrovascular) 03/24/2008     Priority: Medium     Carotids stenosis: younger brother, all of uncles (3 ), dad  Check carotid u/s annual        Past Medical History:   Diagnosis Date     Actinic keratosis      ALOPECIA NOS 3/24/2008     BPH (benign prostatic hyperplasia) 12/1/2010     Erectile dysfunction 12/1/2010     Hyperlipidemia LDL goal <130 10/31/2010     Seizure (H) 10/28/2008    after general anesthesia     Vitamin D deficiency 1/5/2012    Mild, noted 2011 and 2012     Past Surgical History:   Procedure Laterality Date     C APPENDECTOMY  8th Grade     CA ANESTH,CORNEAL TRANSPLANT  2008?     EYE SURGERY      iris surgery     HC PNEUMATIC RETINOPEXY  Multiple    Bilateral     Current Outpatient Medications   Medication Sig Dispense Refill     Calcium Carbonate-Vitamin D (CALCIUM + D PO) Take by mouth daily 1200 MG/1000 MG D3       metroNIDAZOLE (METROCREAM) 0.75 % cream Apply topically daily 45 g 11     ofloxacin (OCUFLOX) 0.3 % ophthalmic solution        prednisoLONE acetate (PRED FORTE) 1 % ophthalmic suspension        simvastatin (ZOCOR) 20 MG tablet TAKE ONE TABLET BY MOUTH AT BEDTIME  90 tablet 3      ValACYclovir HCl (VALTREX PO) Take 1,000 mg by mouth 2 times daily        OTC products: None, except as noted above    No Known Allergies   Latex Allergy: NO    Social History     Tobacco Use     Smoking status: Never Smoker     Smokeless tobacco: Never Used   Substance Use Topics     Alcohol use: Yes     Comment: occasionaly     History   Drug Use No       REVIEW OF SYSTEMS:   CONSTITUTIONAL: NEGATIVE for fever, chills, change in weight  INTEGUMENTARY/SKIN: Saw derm, had squamous cell carcinoma removed from right arm. Patient also had a squamous cell carcinoma found on left lower leg, but does not wan it removed because his arm took a long time to heal and he does not know why they removed such a large chunk of skin from his arm for such a small lesion He has been applying a cream to his arm which has helped significantly. He is interested in a second opinion from another dermatologist to find someone who can do Mohs surgery and remove a smaller amount of skin from his leg   EYES: see HPI regarding diminished vision   ENT/MOUTH: Wondering if he still needs cerumen removal from ears. When patient was wearing headphones while watching a movie he could hear better out of his right ear than his left ear. NEGATIVE for ear, mouth and throat problems  RESP: NEGATIVE for significant cough or SOB, no change from last pre-op or change since stress ECHO   BREAST: NEGATIVE for masses, tenderness or discharge  CV: NEGATIVE for chest pain, palpitations or peripheral edema  GI: NEGATIVE for nausea, abdominal pain, heartburn, or change in bowel habits  : NEGATIVE for frequency, dysuria, or hematuria. Patient wakes to urinate 1-2 times nightly.  Discussed low GFR, patient reports he does not drink enough water   MUSCULOSKELETAL: Patient twisted his right knee golfing four months ago and wore a brace at that time for bursitits (saw sports med), continues to have some pain with twisting motions  NEURO: NEGATIVE for weakness,  "dizziness or paresthesias  ENDOCRINE: NEGATIVE for temperature intolerance, skin/hair changes  HEME: NEGATIVE for bleeding problems  PSYCHIATRIC: NEGATIVE for changes in mood or affect    This document serves as a record of the services and decisions personally performed by KATY HATCH. It was created on his/her behalf by Hugo Vick, a trained medical scribe. The creation of this document is based on the provider's statements to the medical scribe. Hugo Vick, January 30, 2019 11:10 AM  EXAM:   /70 (BP Location: Right arm, Patient Position: Sitting, Cuff Size: Adult Regular)   Pulse 72   Temp 97.7  F (36.5  C) (Oral)   Resp 18   Ht 1.715 m (5' 7.5\")   Wt 81.9 kg (180 lb 9.6 oz)   SpO2 99%   BMI 27.87 kg/m      GENERAL APPEARANCE: healthy, alert and no distress     EYES: EOMI,  Pupil on left irregular from previous surgeries      HENT: left cerumen obstruction. Right ear canals and TM normal and nose and mouth without ulcers or lesions     NECK: no adenopathy, no asymmetry, masses, or scars and thyroid normal to palpation     RESP: lungs clear to auscultation - no rales, rhonchi or wheezes     CV: regular rates and rhythm, normal S1 S2, no S3 or S4 and no murmur, click or rub     ABDOMEN:  soft, nontender, no HSM or masses and bowel sounds normal     MS: extremities normal- no gross deformities noted, no evidence of inflammation in joints, FROM in all extremities.     SKIN: no rash. Healing scar right forearm from previous SCC removal.       NEURO: Normal strength and tone, sensory exam grossly normal, mentation intact and speech normal     PSYCH: mentation appears normal. and affect normal/bright     LYMPHATICS: No cervical adenopathy    DIAGNOSTICS:     Labs Drawn and in Process:   Results for orders placed or performed in visit on 01/30/19   Basic metabolic panel   Result Value Ref Range    Sodium 142 133 - 144 mmol/L    Potassium 5.0 3.4 - 5.3 mmol/L    Chloride 109 94 - 109 " mmol/L    Carbon Dioxide 30 20 - 32 mmol/L    Anion Gap 3 3 - 14 mmol/L    Glucose 80 70 - 99 mg/dL    Urea Nitrogen 24 7 - 30 mg/dL    Creatinine 1.20 0.66 - 1.25 mg/dL    GFR Estimate 62 >60 mL/min/[1.73_m2]    GFR Estimate If Black 72 >60 mL/min/[1.73_m2]    Calcium 8.8 8.5 - 10.1 mg/dL   UA reflex to Microscopic and Culture   Result Value Ref Range    Color Urine Yellow     Appearance Urine Clear     Glucose Urine Negative NEG^Negative mg/dL    Bilirubin Urine Negative NEG^Negative    Ketones Urine Negative NEG^Negative mg/dL    Specific Gravity Urine 1.025 1.003 - 1.035    Blood Urine Negative NEG^Negative    pH Urine 5.5 5.0 - 7.0 pH    Protein Albumin Urine Negative NEG^Negative mg/dL    Urobilinogen Urine 0.2 0.2 - 1.0 EU/dL    Nitrite Urine Negative NEG^Negative    Leukocyte Esterase Urine Negative NEG^Negative    Source Midstream Urine    Albumin Random Urine Quantitative with Creat Ratio   Result Value Ref Range    Creatinine Urine 111 mg/dL    Albumin Urine mg/L <5 mg/L    Albumin Urine mg/g Cr Unable to calculate due to low value 0 - 17 mg/g Cr         Recent Labs   Lab Test 09/17/18  1139 08/29/18  0733 05/04/18  0658 01/18/17  1711  06/04/15  0726   HGB 15.1  --   --  14.6  --  15.2     --   --   --   --  157   NA  --  143 143  --    < >  --    POTASSIUM  --  5.0 4.8  --    < >  --    CR  --  1.24 1.26*  --    < >  --     < > = values in this interval not displayed.     09/25/18 ECHO Stress: Negative dobutamine stress echocardiogram.  No regional wall motion abnormalities at rest and with infusion of dobutamine.  Normal LV size and function at rest. The LVEF is 55-60% and increases  appropriately to 70-75% with dobutamine infusion.  Normal blood pressure response to dobutamine infusion.  No ECG evidence of ischemia at rest and with infusion of dobutamine.  No subjective evidence of ischemia at rest and with infusion of dobutamine.  No significant valvular disease noted on routine screening  color flow Doppler  and pulsed Doppler examination. The ascending aortic size appears normal.      9/17/18 US Carotid:                                                                  Impression:  1. Right side:      Degree of stenosis: Less than 50 %. Small amount of calcified  plaque in the carotid bulb..     2. Left side:       Degree of stenosis: Less than 50 %. Small amount of calcified  plaque in the carotid bulb  IMPRESSION:   Reason for surgery/procedure: diminished vision in left eye     The proposed surgical procedure is considered LOW risk.    REVISED CARDIAC RISK INDEX  The patient has the following serious cardiovascular risks for perioperative complications such as (MI, PE, VFib and 3  AV Block):  No serious cardiac risks  INTERPRETATION: 0 risks: Class I (very low risk - 0.4% complication rate)    The patient has the following additional risks for perioperative complications:  No identified additional risks      ICD-10-CM    1. Preop general physical exam Z01.818    2. Decreased vision H54.7    3. SCC (squamous cell carcinoma) C44.92    4. Decreased GFR R94.4 Basic metabolic panel     UA reflex to Microscopic and Culture     Albumin Random Urine Quantitative with Creat Ratio     US Renal Complete   5. Impacted cerumen of left ear H61.22    6. Benign prostatic hyperplasia with nocturia N40.1     R35.1        Patient offered influenza vaccination and declined. Risks of not vaccinating discussed.   RECOMMENDATIONS:     -- advanced directive forms given to patient for completion.   --patient will get me the name of the dermatologist his brother saw for the second opinion referral.     --Patient is to take all scheduled medications on the day of surgery EXCEPT for modifications listed below.    APPROVAL GIVEN to proceed with proposed procedure, without further diagnostic evaluation    Patient Instructions   The morning of surgery you can take your Valtrex with a small sip of water but do not take your  vitamins that morning. It is fine to use your topical treatments.     Please call [facility] (formerly called [facility]) at [phone] to schedule a kidney ultrasound.     For the health of your kidneys it is important to drink plenty of water.     Call your ENT doctor to schedule an appointment to have the wax removed from your left ear.     Call your sports medicine doctor at [phone] to see if they want to order any imaging before you come in.    [employer] offers free classes to assist you with filling out your advanced directive. You can search the [employer] website to see when these classes are offered.       Before Your Surgery      Call your surgeon if there is any change in your health. This includes signs of a cold or flu (such as a sore throat, runny nose, cough, rash or fever).    Do not smoke, drink alcohol or take over the counter medicine (unless your surgeon or primary care doctor tells you to) for the 24 hours before and after surgery.    If you take prescribed drugs: Follow your doctor s orders about which medicines to take and which to stop until after surgery.    Eating and drinking prior to surgery: follow the instructions from your surgeon    Take a shower or bath the night before surgery. Use the soap your surgeon gave you to gently clean your skin. If you do not have soap from your surgeon, use your regular soap. Do not shave or scrub the surgery site.  Wear clean pajamas and have clean sheets on your bed.          The information in this document, created by the medical scribe for me, accurately reflects the services I personally performed and the decisions made by me. I have reviewed and approved this document for accuracy.   Signed Electronically by: Suzanne Torres MD    ADDENDUM: Provider contacted patient regarding PHQ-9 score, he reports he meant to [place] all 0's instead of 3's and denies any depressive symptoms or  suicidal ideation     Copy of this evaluation report is provided to requesting physician.    [facility] Preop Guidelines    Revised Cardiac Risk Index

## 2019-01-30 NOTE — LETTER
January 30, 2019      Kumar Gonzalez  [id number]  [facility] [address]        Dear Kumar,     It was a pleasure seeing you at your recent visit. Your labs have been reviewed and are attached.     Your kidney function actually looked a tiny bit better on this draw. The urine tests were normal too which is reassuring. Please continue with the plan we developed in the office for the kidney ultrasound as the next step and keep working on drinking more water!    Sincerely,        Suzanne Torres MD      Results for orders placed or performed in visit on 01/30/19   Basic metabolic panel   Result Value Ref Range    Sodium 142 133 - 144 mmol/L    Potassium 5.0 3.4 - 5.3 mmol/L    Chloride 109 94 - 109 mmol/L    Carbon Dioxide 30 20 - 32 mmol/L    Anion Gap 3 3 - 14 mmol/L    Glucose 80 70 - 99 mg/dL    Urea Nitrogen 24 7 - 30 mg/dL    Creatinine 1.20 0.66 - 1.25 mg/dL    GFR Estimate 62 >60 mL/min/[1.73_m2]    GFR Estimate If Black 72 >60 mL/min/[1.73_m2]    Calcium 8.8 8.5 - 10.1 mg/dL   UA reflex to Microscopic and Culture   Result Value Ref Range    Color Urine Yellow     Appearance Urine Clear     Glucose Urine Negative NEG^Negative mg/dL    Bilirubin Urine Negative NEG^Negative    Ketones Urine Negative NEG^Negative mg/dL    Specific Gravity Urine 1.025 1.003 - 1.035    Blood Urine Negative NEG^Negative    pH Urine 5.5 5.0 - 7.0 pH    Protein Albumin Urine Negative NEG^Negative mg/dL    Urobilinogen Urine 0.2 0.2 - 1.0 EU/dL    Nitrite Urine Negative NEG^Negative    Leukocyte Esterase Urine Negative NEG^Negative    Source Midstream Urine    Albumin Random Urine Quantitative with Creat Ratio   Result Value Ref Range    Creatinine Urine 111 mg/dL    Albumin Urine mg/L <5 mg/L    Albumin Urine mg/g Cr Unable to calculate due to low value 0 - 17 mg/g Cr

## 2019-01-30 NOTE — TELEPHONE ENCOUNTER
Order has been placed. I had talked to Kumar over phone regarding another issue and told him correct order is in place- he should call radiology again.

## 2019-01-30 NOTE — TELEPHONE ENCOUNTER
Reason for Call:  Other call back    Detailed comments: Kumar called and tried to schedule his ultrasound that was discussed at today's visit. He was told they did not receive any orders. Please confirm order was placed or sent through.  Thank you     Phone Number Patient can be reached at: Home number on file [phone] (home)    Best Time: Any    Can we leave a detailed message on this number? YES    Call taken on 1/30/2019 at 1:30 PM by Jessica Mcrae

## 2019-01-30 NOTE — PATIENT INSTRUCTIONS
The morning of surgery you can take your Valtrex with a small sip of water but do not take your vitamins that morning. It is fine to use your topical treatments.     Please call [facility] ([facility] at [phone] to schedule a kidney ultrasound.     For the health of your kidneys it is important to drink plenty of water.     Call your ENT doctor to schedule an appointment to have the wax removed from your left ear.     Call your sports medicine doctor at [phone] to see if they want to order any imaging before you come in.    [employer] offers free classes to assist you with filling out your advanced directive. You can search the [employer] website to see when these classes are offered.       Before Your Surgery      Call your surgeon if there is any change in your health. This includes signs of a cold or flu (such as a sore throat, runny nose, cough, rash or fever).    Do not smoke, drink alcohol or take over the counter medicine (unless your surgeon or primary care doctor tells you to) for the 24 hours before and after surgery.    If you take prescribed drugs: Follow your doctor s orders about which medicines to take and which to stop until after surgery.    Eating and drinking prior to surgery: follow the instructions from your surgeon    Take a shower or bath the night before surgery. Use the soap your surgeon gave you to gently clean your skin. If you do not have soap from your surgeon, use your regular soap. Do not shave or scrub the surgery site.  Wear clean pajamas and have clean sheets on your bed.

## 2019-01-30 NOTE — TELEPHONE ENCOUNTER
Please advise.  I see order for renal ultrasound.  Is pt suppose to have another one in addition to this?    Valerie KLINE, Patient Care

## 2019-01-31 ENCOUNTER — ANCILLARY PROCEDURE (OUTPATIENT)
Dept: ULTRASOUND IMAGING | Facility: CLINIC | Age: 69
End: 2019-01-31
Payer: COMMERCIAL

## 2019-01-31 ENCOUNTER — OFFICE VISIT (OUTPATIENT)
Dept: ORTHOPEDICS | Facility: CLINIC | Age: 69
End: 2019-01-31
Payer: COMMERCIAL

## 2019-01-31 ENCOUNTER — ANCILLARY PROCEDURE (OUTPATIENT)
Dept: GENERAL RADIOLOGY | Facility: CLINIC | Age: 69
End: 2019-01-31
Payer: COMMERCIAL

## 2019-01-31 VITALS — SYSTOLIC BLOOD PRESSURE: 109 MMHG | OXYGEN SATURATION: 97 % | DIASTOLIC BLOOD PRESSURE: 82 MMHG | HEART RATE: 66 BPM

## 2019-01-31 DIAGNOSIS — G89.29 CHRONIC PAIN OF RIGHT KNEE: Primary | ICD-10-CM

## 2019-01-31 DIAGNOSIS — R94.4 DECREASED GFR: ICD-10-CM

## 2019-01-31 DIAGNOSIS — M25.561 CHRONIC PAIN OF RIGHT KNEE: Primary | ICD-10-CM

## 2019-01-31 DIAGNOSIS — M25.561 CHRONIC PAIN OF RIGHT KNEE: ICD-10-CM

## 2019-01-31 DIAGNOSIS — G89.29 CHRONIC PAIN OF RIGHT KNEE: ICD-10-CM

## 2019-01-31 PROCEDURE — 99213 OFFICE O/P EST LOW 20 MIN: CPT | Performed by: FAMILY MEDICINE

## 2019-01-31 PROCEDURE — 76770 US EXAM ABDO BACK WALL COMP: CPT | Performed by: RADIOLOGY

## 2019-01-31 PROCEDURE — 73562 X-RAY EXAM OF KNEE 3: CPT | Mod: RT | Performed by: RADIOLOGY

## 2019-01-31 RX ORDER — VALACYCLOVIR HYDROCHLORIDE 1 G/1
TABLET, FILM COATED ORAL
Refills: 6 | COMMUNITY
Start: 2019-01-15 | End: 2019-02-20

## 2019-01-31 ASSESSMENT — PAIN SCALES - GENERAL: PAINLEVEL: NO PAIN (0)

## 2019-01-31 ASSESSMENT — ANXIETY QUESTIONNAIRES: GAD7 TOTAL SCORE: 0

## 2019-01-31 NOTE — NURSING NOTE
Kumar Gonzalez's chief complaint for this visit includes:  Chief Complaint   Patient presents with     Right Knee - Pain     PCP: Suzanne Torres    Referring Provider:  No referring provider defined for this encounter.    /82 (BP Location: Left arm, Patient Position: Chair, Cuff Size: Adult Regular)   Pulse 66   SpO2 97%   No Pain (0)     Do you need any medication refills at today's visit? No

## 2019-01-31 NOTE — PATIENT INSTRUCTIONS
Thanks for coming today.  [facility]  [address] [place] [address]    To schedule future appointments in Ortho Clinic, you may call [phone].    To schedule ordered imaging by your provider:   Call [facility] [phone]    To schedule an injection ordered by your provider:  Call [facility] Injection scheduling line: [phone]  [email or web address] available online at:  [email or web address]    Please call if any further questions or concerns ([phone]).  Clinic hours 8 am to 5 pm.    Return to clinic (call) if symptoms worsen or fail to improve.

## 2019-01-31 NOTE — PROGRESS NOTES
HISTORY OF PRESENT ILLNESS  Mr. Gonzalez is a pleasant 68 year old year old male following up with right knee pain.  I last saw Kumar in early October of last year for what appeared to be peds anserine bursitis.  His pain originally improved with the use of a brace and topicals.  His pain did come back, although not as quite as severe as before.  Since the year started he has been able to ignore his pain, however, it has always been present to some degree.  He points to the medial portion of his knee, worse when twisting his knee and sitting on his foot.  No swelling, no numbness or tingling.  Additional history: as documented      REVIEW OF SYSTEMS (1/31/2019)  10 point ROS of systems including Constitutional, Eyes, Respiratory, Cardiovascular, Gastroenterology, Genitourinary, Integumentary, Musculoskeletal, Psychiatric were all negative except for pertinent positives noted in my HPI.     PHYSICAL EXAM  Vitals:    01/31/19 1351   BP: 109/82   BP Location: Left arm   Patient Position: Chair   Cuff Size: Adult Regular   Pulse: 66   SpO2: 97%     General  - normal appearance, in no obvious distress  CV  - normal popliteal pulse  Pulm  - normal respiratory pattern, non-labored  Musculoskeletal - right knee  - stance: normal gait without limp  - inspection: no effusion, no ecchymosis, normal muscle tone, normal bone and joint alignment, no obvious deformity  - palpation: no joint line tenderness, patella and patellar tendon non-tender, normal popliteal pulse  - ROM: 135 degrees flexion, -5 degrees extension  - strength: 5/5 in flexion, 5/5 in extension  - neuro: no sensory or motor deficit  - special tests:  (-) Lachman  (+) Iva  (-) varus at 0 and 30 degrees flexion  (-) valgus at 0 and 30 degrees flexion  Neuro  - no sensory or motor deficit, grossly normal coordination, normal muscle tone  Skin  - no ecchymosis, erythema, warmth, or induration, no obvious rash  Psych  - interactive, appropriate, normal mood and  affect          ASSESSMENT & PLAN  Mr. Gonzalez is a 68 year old year old male following up with right knee pain.    I ordered & reviewed an xray of his knee which appears normal.  There is minimal to no osteoarthritis.    I do think that Kumar will do great in physical therapy, I am placing the referral.  He can get this set up at his earliest convenience.    If Kumar does well we can follow-up as needed, if he experiences worsening pain we should follow-up in the next 4-6 weeks.  If this is the case I would consider an intra-articular injection or an MRI.    It was a pleasure seeing Kumar.        Dagoberto Plaza DO, CAQSM

## 2019-02-01 DIAGNOSIS — N13.30 HYDRONEPHROSIS, UNSPECIFIED HYDRONEPHROSIS TYPE: Primary | ICD-10-CM

## 2019-02-06 ENCOUNTER — THERAPY VISIT (OUTPATIENT)
Dept: PHYSICAL THERAPY | Facility: CLINIC | Age: 69
End: 2019-02-06
Payer: COMMERCIAL

## 2019-02-06 DIAGNOSIS — M25.561 RIGHT KNEE PAIN: ICD-10-CM

## 2019-02-06 DIAGNOSIS — M25.561 CHRONIC PAIN OF RIGHT KNEE: ICD-10-CM

## 2019-02-06 DIAGNOSIS — G89.29 CHRONIC PAIN OF RIGHT KNEE: ICD-10-CM

## 2019-02-06 PROCEDURE — G8978 MOBILITY CURRENT STATUS: HCPCS | Mod: GP | Performed by: PHYSICAL THERAPIST

## 2019-02-06 PROCEDURE — 97110 THERAPEUTIC EXERCISES: CPT | Mod: GP | Performed by: PHYSICAL THERAPIST

## 2019-02-06 PROCEDURE — 97161 PT EVAL LOW COMPLEX 20 MIN: CPT | Mod: GP | Performed by: PHYSICAL THERAPIST

## 2019-02-06 PROCEDURE — G8979 MOBILITY GOAL STATUS: HCPCS | Mod: GP | Performed by: PHYSICAL THERAPIST

## 2019-02-06 ASSESSMENT — ACTIVITIES OF DAILY LIVING (ADL)
LIMPING: I DO NOT HAVE THE SYMPTOM
GO UP STAIRS: ACTIVITY IS NOT DIFFICULT
WEAKNESS: I HAVE THE SYMPTOM BUT IT DOES NOT AFFECT MY ACTIVITY
GO DOWN STAIRS: ACTIVITY IS NOT DIFFICULT
KNEEL ON THE FRONT OF YOUR KNEE: ACTIVITY IS NOT DIFFICULT
GIVING WAY, BUCKLING OR SHIFTING OF KNEE: I DO NOT HAVE THE SYMPTOM
HOW_WOULD_YOU_RATE_THE_CURRENT_FUNCTION_OF_YOUR_KNEE_DURING_YOUR_USUAL_DAILY_ACTIVITIES_ON_A_SCALE_FROM_0_TO_100_WITH_100_BEING_YOUR_LEVEL_OF_KNEE_FUNCTION_PRIOR_TO_YOUR_INJURY_AND_0_BEING_THE_INABILITY_TO_PERFORM_ANY_OF_YOUR_USUAL_DAILY_ACTIVITIES?: 90
PAIN: I HAVE THE SYMPTOM BUT IT DOES NOT AFFECT MY ACTIVITY
AS_A_RESULT_OF_YOUR_KNEE_INJURY,_HOW_WOULD_YOU_RATE_YOUR_CURRENT_LEVEL_OF_DAILY_ACTIVITY?: NEARLY NORMAL
SWELLING: I DO NOT HAVE THE SYMPTOM
WALK: ACTIVITY IS NOT DIFFICULT
STIFFNESS: I HAVE THE SYMPTOM BUT IT DOES NOT AFFECT MY ACTIVITY
SIT WITH YOUR KNEE BENT: ACTIVITY IS VERY DIFFICULT
STAND: ACTIVITY IS NOT DIFFICULT
HOW_WOULD_YOU_RATE_THE_OVERALL_FUNCTION_OF_YOUR_KNEE_DURING_YOUR_USUAL_DAILY_ACTIVITIES?: NEARLY NORMAL

## 2019-02-06 NOTE — PROGRESS NOTES
[facility] Initial Evaluation  Subjective:  The history is provided by the patient. No  was used.   Kumar Gonzalez is a 68 year old male with a right knee condition.  Condition occurred with:  Insidious onset.  Condition occurred: in the community (pt reports he injured the knee last September, still feels somewhat weak and painful intermittently.).  This is a chronic condition     Patient reports pain:  In the joint and medial.     and is intermittent Pain Scale: no pain today.     Symptoms are exacerbated by ascending stairs, bending/squatting, certain positions and sitting (sitting with knee bent underneath of him) Relieved by: changing positions.  Since onset symptoms are gradually improving.  Special tests:  X-ray.        Pertinent medical history includes:  None.  Medical allergies: no.  Other surgeries include:  Other.  Current medications:  None as reported by the patient.  Current occupation is   .        Barriers include:  None as reported by the patient.    Red flags:  None as reported by the patient.    Goal: be able ot squat, play golf without increased pain, climb stairs up and down and sit normally with the foot underneath of him.    Exercise: normal exercise routine swimming, stair master and lifting the arms.                     Objective:  System                                                Knee Evaluation:  ROM:    AROM    Hyperextension:  Left:  3*    Right: 0*  Extension:  Left: 0*    Right:  0*  Flexion: Left: 152    Right: 146    Pain: no pain with AROM    Strength:     Extension:  Left: 4+/5   Pain:      Right: 4+/5   Pain:  Flexion:  Left: 5/5   Pain:      Right: 5/5   Pain:    Quad Set Left: Good and WNL    Pain:   Quad Set Right:  Fair and delayed    Pain: -      Palpation:  Palpation of knee: normal palpation upon Right joint line, no swelling.          Functional Testing:          Quad:    Single Leg Squat:  Left:       Right:         Bilateral Leg Squat:  Full AROM  Moderate loss of control, excessive anterior knee excursion and decreased hip/trunk flexion              General     ROS    Assessment/Plan:    Patient is a 68 year old male with right side knee complaints.    Patient has the following significant findings with corresponding treatment plan.                Diagnosis 1:  Right knee pain  Pain -  hot/cold therapy, manual therapy, self management, education, directional preference exercise and home program  Decreased ROM/flexibility - manual therapy, therapeutic exercise and therapeutic activity  Decreased strength - therapeutic exercise, therapeutic activities and home program    1) Decision-Making    Low complexity using standardized patient assessment instrument and/or measureable assessment of functional outcome.  Cumulative Therapy Evaluation is: Low complexity.    Previous and current functional limitations:  (See Goal Flow Sheet for this information)    Short term and Long term goals: (See Goal Flow Sheet for this information)     Communication ability:  Patient appears to be able to clearly communicate and understand verbal and written communication and follow directions correctly.  Treatment Explanation - The following has been discussed with the patient:   RX ordered/plan of care  Anticipated outcomes  Possible risks and side effects  This patient would benefit from PT intervention to resume normal activities.   Rehab potential is good.    Frequency:  1 X week, once daily  Duration:  for 6 weeks  Discharge Plan:  Achieve all LTG.  Independent in home treatment program.  Reach maximal therapeutic benefit.    Please refer to the daily flowsheet for treatment today, total treatment time and time spent performing 1:1 timed codes.

## 2019-02-07 ENCOUNTER — ANCILLARY PROCEDURE (OUTPATIENT)
Dept: CT IMAGING | Facility: CLINIC | Age: 69
End: 2019-02-07
Payer: COMMERCIAL

## 2019-02-07 DIAGNOSIS — N13.30 HYDRONEPHROSIS, UNSPECIFIED HYDRONEPHROSIS TYPE: ICD-10-CM

## 2019-02-07 DIAGNOSIS — N13.30 HYDRONEPHROSIS: Primary | ICD-10-CM

## 2019-02-07 PROCEDURE — 74178 CT ABD&PLV WO CNTR FLWD CNTR: CPT | Performed by: RADIOLOGY

## 2019-02-07 RX ORDER — IOPAMIDOL 755 MG/ML
111 INJECTION, SOLUTION INTRAVASCULAR ONCE
Status: COMPLETED | OUTPATIENT
Start: 2019-02-07 | End: 2019-02-07

## 2019-02-07 RX ADMIN — IOPAMIDOL 110 ML: 755 INJECTION, SOLUTION INTRAVASCULAR at 16:18

## 2019-02-08 ENCOUNTER — TELEPHONE (OUTPATIENT)
Dept: FAMILY MEDICINE | Facility: CLINIC | Age: 69
End: 2019-02-08

## 2019-02-08 DIAGNOSIS — R91.8 ABNORMAL CT SCAN, LUNG: Primary | ICD-10-CM

## 2019-02-08 NOTE — TELEPHONE ENCOUNTER
Notes recorded by Suzanne Torres MD on 2/8/2019 at 11:33 AM CST  Please update patient on CT results:  - kidney showed no dilation on scan- good! There are a few cysts in the left kidney (these are not worrisome) that made it look this way on ultrasound. No further w/u needed for this.   - incidentally, a few other things were seen:     * the scan saw the lower lobes of the lung and saw several areas in the lung were not fully inflated in. The radiologist suggested a CT scan of the chest to look into this further. I would suggest he work on deep breathing exercises to see if he can get the lungs better aerated and then call [facility] to schedule the CT chest.      * several other more minor changes- I would like to review them in office with patient. Please assist with scheduling OV to go over scan results.    Called and spoke with patient. He states understanding.     He will schedule CT scan with  [facility].     Patient is out of town all next week and he is scheduled for follow-up on Wednesday 2/20/19 to review the minor changes in patient's imaging, that provider advised on.     Is this okay to wait that long for appointment, provider?    Also, patient is worried about results and is wondering if he should be worried or concerned, anything emergent?    Routing to provider to review and advise.     Soledad Jacobsen RN

## 2019-02-11 NOTE — TELEPHONE ENCOUNTER
There was nothing emergent or worrisome, but just hard to review in succinct manner. OV would work better. Ok to wait until visit patient has planned

## 2019-02-19 ENCOUNTER — ANCILLARY PROCEDURE (OUTPATIENT)
Dept: CT IMAGING | Facility: CLINIC | Age: 69
End: 2019-02-19
Attending: FAMILY MEDICINE
Payer: COMMERCIAL

## 2019-02-19 DIAGNOSIS — R91.8 ABNORMAL CT SCAN, LUNG: ICD-10-CM

## 2019-02-19 PROCEDURE — 71260 CT THORAX DX C+: CPT | Performed by: RADIOLOGY

## 2019-02-19 RX ORDER — IOPAMIDOL 755 MG/ML
89 INJECTION, SOLUTION INTRAVASCULAR ONCE
Status: COMPLETED | OUTPATIENT
Start: 2019-02-19 | End: 2019-02-19

## 2019-02-19 RX ADMIN — IOPAMIDOL 89 ML: 755 INJECTION, SOLUTION INTRAVASCULAR at 10:26

## 2019-02-20 ENCOUNTER — OFFICE VISIT (OUTPATIENT)
Dept: FAMILY MEDICINE | Facility: CLINIC | Age: 69
End: 2019-02-20
Payer: COMMERCIAL

## 2019-02-20 VITALS
WEIGHT: 180.4 LBS | TEMPERATURE: 97.6 F | SYSTOLIC BLOOD PRESSURE: 110 MMHG | DIASTOLIC BLOOD PRESSURE: 71 MMHG | HEIGHT: 68 IN | RESPIRATION RATE: 16 BRPM | HEART RATE: 70 BPM | OXYGEN SATURATION: 97 % | BODY MASS INDEX: 27.34 KG/M2

## 2019-02-20 DIAGNOSIS — R91.8 ABNORMAL CT SCAN, LUNG: Primary | ICD-10-CM

## 2019-02-20 DIAGNOSIS — E78.5 HYPERLIPIDEMIA LDL GOAL <130: ICD-10-CM

## 2019-02-20 DIAGNOSIS — J98.19 RIGHT MIDDLE LOBE SYNDROME: ICD-10-CM

## 2019-02-20 DIAGNOSIS — I25.10 CALCIFICATION OF CORONARY ARTERY: ICD-10-CM

## 2019-02-20 PROCEDURE — 99214 OFFICE O/P EST MOD 30 MIN: CPT | Performed by: FAMILY MEDICINE

## 2019-02-20 RX ORDER — ATORVASTATIN CALCIUM 40 MG/1
40 TABLET, FILM COATED ORAL DAILY
Qty: 90 TABLET | Refills: 3 | Status: SHIPPED | OUTPATIENT
Start: 2019-02-20 | End: 2020-05-28

## 2019-02-20 ASSESSMENT — PAIN SCALES - GENERAL: PAINLEVEL: NO PAIN (0)

## 2019-02-20 ASSESSMENT — MIFFLIN-ST. JEOR: SCORE: 1559.85

## 2019-02-21 NOTE — PROGRESS NOTES
SUBJECTIVE:   Kumar Gonzalez is a 68 year old male who presents to clinic today for the following health issues:    1. Follow up results of CT scan - 2/7/19, 2/19/19, US on 1/31/19     -Denies any reflux symptoms  -Wondering how much calcium he should be taking since he found out he has calcium in his arteries from the scan. 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 He does these every 2 years    Additional:  -Reports he feels more fatigued than he used to   -Has not been able to schedule with ENT for cerumen removal yet but still plans this  -Saw sports med for his right knee and is doing therapy    Problem list and histories reviewed & adjusted, as indicated.  Additional history: as documented    Patient Active Problem List   Diagnosis     Alopecia     Family history of stroke (cerebrovascular)     HYPERLIPIDEMIA LDL GOAL <130     Colon polyp     BPH (benign prostatic hyperplasia)     Loss of height     Erectile dysfunction     Impacted cerumen     Rosacea     Osteoporosis     Lentigo     Family history of melanoma     History of actinic keratosis     Xerosis cutis     Advanced directives, counseling/discussion     Vitamin D deficiency     KP (keratosis pilaris)     AK (actinic keratosis)     Anesthesia complication     Solar lentigo     Insomnia     Hydronephrosis, unspecified hydronephrosis type     Right knee pain     Past Surgical History:   Procedure Laterality Date     C APPENDECTOMY  8th Grade     CA ANESTH,CORNEAL TRANSPLANT  2008?     EYE SURGERY      iris surgery     HC PNEUMATIC RETINOPEXY  Multiple    Bilateral       Social History     Tobacco Use     Smoking status: Never Smoker     Smokeless tobacco: Never Used   Substance Use Topics     Alcohol use: Yes     Comment: occasionaly     Family History   Problem  "Relation Age of Onset     Cerebrovascular Disease Father      Diabetes Paternal Grandfather      Cancer Brother         all uncles, and brother. Unsure type     Cancer Other         cousin with melanoma     Asthma No family hx of      C.A.D. No family hx of      Hypertension No family hx of      Breast Cancer No family hx of      Cancer - colorectal No family hx of      Prostate Cancer No family hx of            Reviewed and updated as needed this visit by clinical staff  Tobacco  Allergies  Meds       Reviewed and updated as needed this visit by Provider         ROS:  Constitutional, HEENT, cardiovascular, pulmonary, gi and gu systems are negative, except as otherwise noted.    This document serves as a record of the services and decisions personally performed by KATY HATCH. It was created on his/her behalf by Hugo Vick, a trained medical scribe. The creation of this document is based on the provider's statements to the medical scribe. Hugo Vick, February 20, 2019 6:40 PM  OBJECTIVE:     /71 (BP Location: Right arm, Patient Position: Sitting, Cuff Size: Adult Regular)   Pulse 70   Temp 97.6  F (36.4  C) (Oral)   Resp 16   Ht 1.715 m (5' 7.5\")   Wt 81.8 kg (180 lb 6.4 oz)   SpO2 97%   BMI 27.84 kg/m    Body mass index is 27.84 kg/m .  GENERAL: healthy, alert and no distress  PSYCH: mentation appears normal, affect normal/bright      1/31/19 US Renal:  IMPRESSION:  1.  There is mild left renal hydronephrosis. If clinically indicated,  further evaluation with CT can be performed.      2/07/19 CT Urogram w & w/o:   IMPRESSION:   1. No hydronephrosis. Multiple simple parapelvic cysts in the left  kidney were simulating hydronephrosis.   2. Mild prostatomegaly.  3. Chronic appearing atelectasis in either the right middle or lower  lobe as well as the lingula, consider CT of the chest for further  characterization.  4. 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      2/19/19 CT chest w/ contrast   IMPRESSION:  1. Cicatricial atelectasis in the right middle lobe likely is chronic  (right middle lobe syndrome pattern) in given the calcified  mediastinal and hilar lymph nodes likely represent sequela of old  granulomatous disease such as mycobacteria or fungal pneumonia.  2. Minimal tree-in-bud opacity left upper lobe likely represent some  ongoing atypical infection such as nontuberculous mycobacteria.    09/25/18 ECHO: Interpretation Summary     Negative dobutamine stress echocardiogram.  No regional wall motion abnormalities at rest and with infusion of dobutamine.  Normal LV size and function at rest. The LVEF is 55-60% and increases  appropriately to 70-75% with dobutamine infusion.  Normal blood pressure response to dobutamine infusion.  No ECG evidence of ischemia at rest and with infusion of dobutamine.  No subjective evidence of ischemia at rest and with infusion of dobutamine.  No significant valvular disease noted on routine screening color flow Doppler  and pulsed Doppler examination. The ascending aortic size appears normal.  ASSESSMENT/PLAN:     1. Abnormal CT scan, lung  4. Right middle lobe syndrome  Extensive discussion regarding CT findings. Patient needs pulmonary f/u and he desires to complete this at [facility]. He will have scans sent to [facility]. If local referral needed, he will let me know.     2. Hyperlipidemia LDL goal <130  3. Calcification of coronary artery  Due to presence of calcium in coronary arteries, switch from simvastatin to atorvastatin and start asa discussed  - atorvastatin (LIPITOR) 40 MG tablet; Take 1 tablet (40 mg) by mouth daily  Dispense: 90 tablet; Refill: 3            Patient Instructions   Start powdered fiber such as Metamucil or Citrucel: start 1 tsp per day, and increase by 1 tsp per week to goal total dosing of 1-2 tablespoons per day. Drink plenty of water daily for fiber to be effective.    Consider  starting a daily baby aspirin (81 mg).    The goal is to get 1200 mg calcium daily from diet and supplement combined.     When you run out of the simvastatin start taking atorvastatin. Let me know if you have any side effects after making this change.     Call [phone] to have Catrina Reina put your scans on a disc so that you can bring them with you to [place].        Length of visit was 35 minutes with more than 50 percent of that time used for discussing medical concerns and education    The information in this document, created by the medical scribe for me, accurately reflects the services I personally performed and the decisions made by me. I have reviewed and approved this document for accuracy.   Suzanne Torres MD  [facility]

## 2019-02-21 NOTE — PATIENT INSTRUCTIONS
Start powdered fiber such as Metamucil or Citrucel: start 1 tsp per day, and increase by 1 tsp per week to goal total dosing of 1-2 tablespoons per day. Drink plenty of water daily for fiber to be effective.    Consider starting a daily baby aspirin (81 mg).    The goal is to get 1200 mg calcium daily from diet and supplement combined.     When you run out of the simvastatin start taking atorvastatin. Let me know if you have any side effects after making this change.     Call [phone] to have Catrina Reina put your scans on a disc so that you can bring them with you to [place].

## 2019-03-02 ENCOUNTER — OFFICE VISIT (OUTPATIENT)
Dept: OPHTHALMOLOGY | Facility: CLINIC | Age: 69
End: 2019-03-02
Payer: COMMERCIAL

## 2019-03-02 DIAGNOSIS — Z98.890 S/P EYE SURGERY: ICD-10-CM

## 2019-03-02 DIAGNOSIS — Z98.890 HISTORY OF INTRAOCULAR LENS EXCHANGE: ICD-10-CM

## 2019-03-02 DIAGNOSIS — Z48.810 AFTERCARE FOLLOWING SURGERY OF A SENSE ORGAN: Primary | ICD-10-CM

## 2019-03-02 DIAGNOSIS — Z86.69 HISTORY OF RETINAL DETACHMENT: ICD-10-CM

## 2019-03-02 DIAGNOSIS — H43.12 VITREOUS HEMORRHAGE OF LEFT EYE (H): ICD-10-CM

## 2019-03-02 ASSESSMENT — CONF VISUAL FIELD
OS_SUPERIOR_TEMPORAL_RESTRICTION: 1
OS_INFERIOR_TEMPORAL_RESTRICTION: 1
OS_SUPERIOR_NASAL_RESTRICTION: 1
OS_INFERIOR_NASAL_RESTRICTION: 1

## 2019-03-02 ASSESSMENT — SLIT LAMP EXAM - LIDS: COMMENTS: NORMAL

## 2019-03-02 ASSESSMENT — TONOMETRY
OS_IOP_MMHG: 14
IOP_METHOD: TONOPEN
OD_IOP_MMHG: CTL

## 2019-03-02 ASSESSMENT — EXTERNAL EXAM - LEFT EYE: OS_EXAM: TR EDEMA

## 2019-03-02 ASSESSMENT — VISUAL ACUITY
OD_SC: 20/20
METHOD: SNELLEN - LINEAR
OS_SC: LP

## 2019-03-02 ASSESSMENT — EXTERNAL EXAM - RIGHT EYE: OD_EXAM: NORMAL

## 2019-03-02 NOTE — PROGRESS NOTES
"CC: blurry vision left eye    HPI  Kumar Gonzalez is a 68 year old male who presents for evalaution of poor vision following retinal surgery.  Patient of Dr. Mustapha Hallman who underwent lens implantation this past week (Yamani technique).  No records available for review - history per patient.  He says that he had a corneal transplant this past November.  Cornea has looked good in follow-up.  He has been in an eye patch since Thursday without drops.  He was instructed to take off his patch today and was not able to see any light.  Last night was bending over and got a significant pressure sensation.  He feels comfortable overall at this time.  No fever, nausea, chills, vomitting.    He has a history of retinal detachment of both eyes.  Repair with (at least) buckle in the left eye.  He also says he has had \"iris surgery\". He says he was very nearsighted prior to cataract surgery.  Since his retinal surgeries his vision has been reduced.  This happened back around 2008.      No eye pain currently, but the eye \"feels funny\" in the left eye.  He thinks this may be from a surgery stich.    Past Medical History:   Diagnosis Date     Actinic keratosis      ALOPECIA NOS 3/24/2008     BPH (benign prostatic hyperplasia) 12/1/2010     Erectile dysfunction 12/1/2010     Hyperlipidemia LDL goal <130 10/31/2010     Seizure (H) 10/28/2008    after general anesthesia     Vitamin D deficiency 1/5/2012    Mild, noted 2011 and 2012   Past Ocular History: retinal detachment  Repair with buckle (each eye? At least right eye), lens replacement 02/27/19 for ?lens dislocation  Family history: No glaucoma or macular degeneration    Eye Medications:  None    Imaging:  B-scan 03/02/19   Retina attached, prominent vit heme, ?choridals    Assessment & Plan     Kumar Gonzalez is a 68 year old male with the following diagnoses:   1. Aftercare following surgery of a sense organ    2. History of intraocular lens exchange    3. History of retinal " detachment    4. Vitreous hemorrhage of left eye (H)      - pod3 yamani lens exchange   - had K abrasion postop and was patched for 2 days, has not started hgtts yet  - vitreous heme on exam and bscan noted  - start PF/vigamox qid  - recheck Monday with Dr. Hallman, Return to Clinic tomorrow if worsening of pain  ----------------------------------------------------------------------------------    Patient disposition:   Return in about 2 days (around 3/4/2019) for follow-up with Dr. Hallman., sooner as needed.      Patient evaluated with Dr. Amarilis WILLIAM, Retina Fellow    Lalo Gonzalez M.D.  PGY-3, Ophthalmology           Complete documentation of historical and exam elements from today's encounter can be found in the full encounter summary report (not reduplicated in this progress note).  I personally obtained the chief complaint(s) and history of present illness.  I confirmed and edited as necessary the review of systems, past medical/surgical history, family history, social history, and examination findings as documented by others; and I examined the patient myself.  I personally reviewed the relevant tests, images, and reports as documented above.  I personally reviewed the ophthalmic test(s) associated with this encounter, agree with the interpretation(s) as documented by the resident/fellow, and have edited the corresponding report(s) as necessary.   I formulated and edited as necessary the assessment and plan and discussed the findings and management plan with the patient and family      Tony Olmos MD PhD  Vitreoretinal Surgery Fellow  [facility]

## 2019-03-06 ENCOUNTER — THERAPY VISIT (OUTPATIENT)
Dept: PHYSICAL THERAPY | Facility: CLINIC | Age: 69
End: 2019-03-06
Payer: COMMERCIAL

## 2019-03-06 DIAGNOSIS — M25.561 RIGHT KNEE PAIN: ICD-10-CM

## 2019-03-06 PROCEDURE — 97110 THERAPEUTIC EXERCISES: CPT | Mod: GP | Performed by: PHYSICAL THERAPY ASSISTANT

## 2019-03-20 ENCOUNTER — THERAPY VISIT (OUTPATIENT)
Dept: PHYSICAL THERAPY | Facility: CLINIC | Age: 69
End: 2019-03-20
Payer: COMMERCIAL

## 2019-03-20 DIAGNOSIS — M25.561 RIGHT KNEE PAIN: ICD-10-CM

## 2019-03-20 PROCEDURE — 97110 THERAPEUTIC EXERCISES: CPT | Mod: GP | Performed by: PHYSICAL THERAPY ASSISTANT

## 2019-03-21 ENCOUNTER — TELEPHONE (OUTPATIENT)
Dept: FAMILY MEDICINE | Facility: CLINIC | Age: 69
End: 2019-03-21

## 2019-03-21 ENCOUNTER — OFFICE VISIT (OUTPATIENT)
Dept: FAMILY MEDICINE | Facility: CLINIC | Age: 69
End: 2019-03-21
Payer: COMMERCIAL

## 2019-03-21 VITALS
RESPIRATION RATE: 16 BRPM | HEART RATE: 51 BPM | DIASTOLIC BLOOD PRESSURE: 74 MMHG | WEIGHT: 173 LBS | OXYGEN SATURATION: 98 % | SYSTOLIC BLOOD PRESSURE: 110 MMHG | HEIGHT: 68 IN | TEMPERATURE: 97.5 F | BODY MASS INDEX: 26.22 KG/M2

## 2019-03-21 DIAGNOSIS — R00.1 BRADYCARDIA: ICD-10-CM

## 2019-03-21 DIAGNOSIS — R53.83 FATIGUE, UNSPECIFIED TYPE: Primary | ICD-10-CM

## 2019-03-21 DIAGNOSIS — R09.81 SINUS CONGESTION: ICD-10-CM

## 2019-03-21 LAB
ALBUMIN SERPL-MCNC: 3.8 G/DL (ref 3.4–5)
ALBUMIN UR-MCNC: NEGATIVE MG/DL
ALP SERPL-CCNC: 62 U/L (ref 40–150)
ALT SERPL W P-5'-P-CCNC: 25 U/L (ref 0–70)
ANION GAP SERPL CALCULATED.3IONS-SCNC: 7 MMOL/L (ref 3–14)
APPEARANCE UR: CLEAR
AST SERPL W P-5'-P-CCNC: 18 U/L (ref 0–45)
BASOPHILS # BLD AUTO: 0 10E9/L (ref 0–0.2)
BASOPHILS NFR BLD AUTO: 0.6 %
BILIRUB SERPL-MCNC: 0.8 MG/DL (ref 0.2–1.3)
BILIRUB UR QL STRIP: NEGATIVE
BUN SERPL-MCNC: 28 MG/DL (ref 7–30)
CALCIUM SERPL-MCNC: 8.9 MG/DL (ref 8.5–10.1)
CHLORIDE SERPL-SCNC: 110 MMOL/L (ref 94–109)
CO2 SERPL-SCNC: 24 MMOL/L (ref 20–32)
COLOR UR AUTO: YELLOW
CREAT SERPL-MCNC: 1.32 MG/DL (ref 0.66–1.25)
DIFFERENTIAL METHOD BLD: NORMAL
EOSINOPHIL # BLD AUTO: 0.2 10E9/L (ref 0–0.7)
EOSINOPHIL NFR BLD AUTO: 2.3 %
ERYTHROCYTE [DISTWIDTH] IN BLOOD BY AUTOMATED COUNT: 14.1 % (ref 10–15)
FLUAV+FLUBV AG SPEC QL: NEGATIVE
FLUAV+FLUBV AG SPEC QL: NEGATIVE
GFR SERPL CREATININE-BSD FRML MDRD: 55 ML/MIN/{1.73_M2}
GLUCOSE SERPL-MCNC: 99 MG/DL (ref 70–99)
GLUCOSE UR STRIP-MCNC: NEGATIVE MG/DL
HCT VFR BLD AUTO: 42.4 % (ref 40–53)
HETEROPH AB SER QL: NEGATIVE
HGB BLD-MCNC: 15 G/DL (ref 13.3–17.7)
HGB UR QL STRIP: NEGATIVE
KETONES UR STRIP-MCNC: ABNORMAL MG/DL
LEUKOCYTE ESTERASE UR QL STRIP: NEGATIVE
LYMPHOCYTES # BLD AUTO: 1.4 10E9/L (ref 0.8–5.3)
LYMPHOCYTES NFR BLD AUTO: 20.9 %
MCH RBC QN AUTO: 32.1 PG (ref 26.5–33)
MCHC RBC AUTO-ENTMCNC: 35.4 G/DL (ref 31.5–36.5)
MCV RBC AUTO: 91 FL (ref 78–100)
MONOCYTES # BLD AUTO: 0.5 10E9/L (ref 0–1.3)
MONOCYTES NFR BLD AUTO: 6.9 %
NEUTROPHILS # BLD AUTO: 4.5 10E9/L (ref 1.6–8.3)
NEUTROPHILS NFR BLD AUTO: 69.3 %
NITRATE UR QL: NEGATIVE
PH UR STRIP: 5 PH (ref 5–7)
PLATELET # BLD AUTO: 172 10E9/L (ref 150–450)
POTASSIUM SERPL-SCNC: 4.1 MMOL/L (ref 3.4–5.3)
PROT SERPL-MCNC: 6.8 G/DL (ref 6.8–8.8)
RBC # BLD AUTO: 4.67 10E12/L (ref 4.4–5.9)
SODIUM SERPL-SCNC: 141 MMOL/L (ref 133–144)
SOURCE: ABNORMAL
SP GR UR STRIP: 1.02 (ref 1–1.03)
SPECIMEN SOURCE: NORMAL
TSH SERPL DL<=0.005 MIU/L-ACNC: 0.93 MU/L (ref 0.4–4)
UROBILINOGEN UR STRIP-ACNC: 0.2 EU/DL (ref 0.2–1)
WBC # BLD AUTO: 6.5 10E9/L (ref 4–11)

## 2019-03-21 PROCEDURE — 99214 OFFICE O/P EST MOD 30 MIN: CPT | Performed by: FAMILY MEDICINE

## 2019-03-21 PROCEDURE — 87804 INFLUENZA ASSAY W/OPTIC: CPT | Performed by: FAMILY MEDICINE

## 2019-03-21 PROCEDURE — 84443 ASSAY THYROID STIM HORMONE: CPT | Performed by: FAMILY MEDICINE

## 2019-03-21 PROCEDURE — 81003 URINALYSIS AUTO W/O SCOPE: CPT | Performed by: FAMILY MEDICINE

## 2019-03-21 PROCEDURE — 93000 ELECTROCARDIOGRAM COMPLETE: CPT | Performed by: FAMILY MEDICINE

## 2019-03-21 PROCEDURE — 85025 COMPLETE CBC W/AUTO DIFF WBC: CPT | Performed by: FAMILY MEDICINE

## 2019-03-21 PROCEDURE — 80053 COMPREHEN METABOLIC PANEL: CPT | Performed by: FAMILY MEDICINE

## 2019-03-21 PROCEDURE — 86308 HETEROPHILE ANTIBODY SCREEN: CPT | Performed by: FAMILY MEDICINE

## 2019-03-21 PROCEDURE — 36415 COLL VENOUS BLD VENIPUNCTURE: CPT | Performed by: FAMILY MEDICINE

## 2019-03-21 RX ORDER — VALACYCLOVIR HYDROCHLORIDE 1 G/1
TABLET, FILM COATED ORAL
Refills: 1 | COMMUNITY
Start: 2019-03-09 | End: 2019-03-21

## 2019-03-21 RX ORDER — OFLOXACIN 3 MG/ML
SOLUTION/ DROPS OPHTHALMIC
Refills: 0 | COMMUNITY
Start: 2019-03-02 | End: 2023-08-01

## 2019-03-21 RX ORDER — BRIMONIDINE TARTRATE, TIMOLOL MALEATE 2; 5 MG/ML; MG/ML
SOLUTION/ DROPS OPHTHALMIC
Refills: 0 | COMMUNITY
Start: 2019-03-14 | End: 2023-08-01

## 2019-03-21 RX ORDER — ACETAZOLAMIDE 250 MG/1
TABLET ORAL
Refills: 0 | COMMUNITY
Start: 2019-03-14 | End: 2020-06-03

## 2019-03-21 RX ORDER — LATANOPROST 50 UG/ML
SOLUTION/ DROPS OPHTHALMIC
Refills: 0 | COMMUNITY
Start: 2019-03-12 | End: 2023-08-01

## 2019-03-21 ASSESSMENT — MIFFLIN-ST. JEOR: SCORE: 1526.28

## 2019-03-21 NOTE — PATIENT INSTRUCTIONS
Push more water.     Depending on your labs, we may start antibiotics for a possible sinus infection and consider a heart monitor.

## 2019-03-21 NOTE — TELEPHONE ENCOUNTER
"Patient reporting extreme weakness today that started three weeks ago with eye surgeries that didn't go well.    Patient is on 6 different eye drops.    Looks like has history of atelectasis per CT.    \"I am so weak that I can't go to get mail.  I was at PT yesterday and had hard time getting around,\" patient said.    Patient denies SOB, or chest pain.    Has an apt with PCP at 11:00 am today, spouse will drive.    Luz Higgins RN      "

## 2019-03-21 NOTE — TELEPHONE ENCOUNTER
Reason for call:  Patient reporting a symptom    Symptom or request: Extreme Fatigue   Duration (how long have symptoms been present): 2 weeks worsening    Have you been treated for this before? No    Additional comments:  Lungs may be a problem    Phone Number patient can be reached at:  Cell number on file:    Telephone Information:   Mobile [phone]       Best Time: any   Can we leave a detailed message on this number:  YES    Call taken on 3/21/2019 at 7:56 AM by Belle Rios

## 2019-03-21 NOTE — PROGRESS NOTES
SUBJECTIVE:   Kumar Gonzalez is a 68 year old male who presents to clinic today for the following health issues:    Concern - Sleep Problem   Onset: couple weeks now     Description:   Sleeping too much, daytime drowsiness - not sure of cause. After a good night sleep patient is still tired. Denies snoring.     Intensity: severe    Progression of Symptoms:  worsening    Accompanying Signs & Symptoms:  Low stamina     Previous history of similar problem:   No    -Patient reports he has been experiencing fatigue for a few weeks, though he did complain of fatigue at a pre-op six months ago. This feels different than what he complained of then. When fatigue started patient was also having bad headaches that correlated to pressure in eyes, for which patient was given acetazolamide by his ophthalmologist , but the headaches have resolved. He is sleeping through the night but still wakes up tired and then naps during the day. His wife thinks he is sleeping 16-18 hours per day. He is not snoring at night. He notes he had some brain fog that has improved since the acetazolamide was reduced, but his fatigue did not improve  -Yesterday patient walked a mile but had to stop and rest because he was so physically tired, but he did not have any SOB   -He was at PT for his knee yesterday and they thought his fatigue could be due to his multiple medications for his eyes or possibly not getting enough oxygen, and since patient had recent abnormal chest CT he is worried about this   -Patient's wife noted he had a subjective fever one day when the headaches were severe. Patient complains of dry mouth but once he takes a sip of water he feels full and doesn't want anymore, so thinks he is not getting enough fluids. He is still eating but feels like he gets full faster. He did have one episode of abdominal pain after one of his eye surgeries, which felt like acid and cramping, but this resolved after eating oatmeal. He has not had any  recurrent episodes    -Patient has been urinating frequently, denies dysuria. He is unsure if the frequency increased when he started acetazolamide  -Has been blowing his nose more frequently and has been congested since his eye operations   -Denies sinus pressures or pain, throat pain, cough, SOB, palpitations, lower extremity edema, bowel changes, myalgia, new joint pain, pelvic pain, hematochezia, focal extremity weakness, tingling     Wt Readings from Last 4 Encounters:   03/21/19 78.5 kg (173 lb)   02/20/19 81.8 kg (180 lb 6.4 oz)   01/30/19 81.9 kg (180 lb 9.6 oz)   09/17/18 79.4 kg (175 lb)       Problem list and histories reviewed & adjusted, as indicated.  Additional history: as documented    Patient Active Problem List   Diagnosis     Alopecia     Family history of stroke (cerebrovascular)     HYPERLIPIDEMIA LDL GOAL <130     Colon polyp     BPH (benign prostatic hyperplasia)     Loss of height     Erectile dysfunction     Impacted cerumen     Rosacea     Osteoporosis     Lentigo     Family history of melanoma     History of actinic keratosis     Xerosis cutis     Advanced directives, counseling/discussion     Vitamin D deficiency     KP (keratosis pilaris)     AK (actinic keratosis)     Anesthesia complication     Solar lentigo     Insomnia     Hydronephrosis, unspecified hydronephrosis type     Right knee pain     Past Surgical History:   Procedure Laterality Date     C APPENDECTOMY  8th Grade     CA ANESTH,CORNEAL TRANSPLANT  2008?     EYE SURGERY      iris surgery     HC PNEUMATIC RETINOPEXY  Multiple    Bilateral       Social History     Tobacco Use     Smoking status: Never Smoker     Smokeless tobacco: Never Used   Substance Use Topics     Alcohol use: Yes     Comment: occasionaly     Family History   Problem Relation Age of Onset     Cerebrovascular Disease Father      Diabetes Paternal Grandfather      Cancer Brother         all uncles, and brother. Unsure type     Cancer Other         cousin with  "melanoma     Asthma No family hx of      C.A.D. No family hx of      Hypertension No family hx of      Breast Cancer No family hx of      Cancer - colorectal No family hx of      Prostate Cancer No family hx of            Reviewed and updated as needed this visit by clinical staff  Tobacco  Allergies  Meds  Med Hx  Surg Hx  Fam Hx  Soc Hx      Reviewed and updated as needed this visit by Provider         ROS:  Constitutional, HEENT, cardiovascular, pulmonary, gi and gu systems are negative, except as otherwise noted.    This document serves as a record of the services and decisions personally performed by KATY HATCH. It was created on his/her behalf by Hugo Vick, a trained medical scribe. The creation of this document is based on the provider's statements to the medical scribe. Hugo Vick, March 21, 2019 11:22 AM  OBJECTIVE:     /74 (BP Location: Right arm)   Pulse 51   Temp 97.5  F (36.4  C) (Oral)   Resp 16   Ht 1.715 m (5' 7.5\")   Wt 78.5 kg (173 lb)   SpO2 98%   BMI 26.70 kg/m    Body mass index is 26.7 kg/m .  GENERAL: alert and no distress  EYES: Eyes grossly normal to inspection, PERRL and conjunctivae and sclerae normal  HENT: no frontal or maxillary sinus tenderness. ear canals and TM's normal, nose with mucosal edema and mouth without ulcers or lesions  NECK: no adenopathy, no asymmetry, masses, or scars and thyroid normal to palpation  RESP: lungs clear to auscultation - no rales, rhonchi or wheezes  CV: regular rate and rhythm, normal S1 S2, no S3 or S4, no murmur, click or rub, no peripheral edema and peripheral pulses strong  ABDOMEN: soft, nontender, no hepatosplenomegaly, no masses and bowel sounds normal  SKIN: ill-defined erythematous papular rash anterior chest and upper back  MS: no gross musculoskeletal defects noted, no edema  PSYCH: mentation appears normal, affect normal/bright    Results for orders placed or performed in visit on 03/21/19   UA reflex " to Microscopic and Culture   Result Value Ref Range    Color Urine Yellow     Appearance Urine Clear     Glucose Urine Negative NEG^Negative mg/dL    Bilirubin Urine Negative NEG^Negative    Ketones Urine Trace (A) NEG^Negative mg/dL    Specific Gravity Urine 1.025 1.003 - 1.035    Blood Urine Negative NEG^Negative    pH Urine 5.0 5.0 - 7.0 pH    Protein Albumin Urine Negative NEG^Negative mg/dL    Urobilinogen Urine 0.2 0.2 - 1.0 EU/dL    Nitrite Urine Negative NEG^Negative    Leukocyte Esterase Urine Negative NEG^Negative    Source Midstream Urine    CBC with platelets differential   Result Value Ref Range    WBC 6.5 4.0 - 11.0 10e9/L    RBC Count 4.67 4.4 - 5.9 10e12/L    Hemoglobin 15.0 13.3 - 17.7 g/dL    Hematocrit 42.4 40.0 - 53.0 %    MCV 91 78 - 100 fl    MCH 32.1 26.5 - 33.0 pg    MCHC 35.4 31.5 - 36.5 g/dL    RDW 14.1 10.0 - 15.0 %    Platelet Count 172 150 - 450 10e9/L    % Neutrophils 69.3 %    % Lymphocytes 20.9 %    % Monocytes 6.9 %    % Eosinophils 2.3 %    % Basophils 0.6 %    Absolute Neutrophil 4.5 1.6 - 8.3 10e9/L    Absolute Lymphocytes 1.4 0.8 - 5.3 10e9/L    Absolute Monocytes 0.5 0.0 - 1.3 10e9/L    Absolute Eosinophils 0.2 0.0 - 0.7 10e9/L    Absolute Basophils 0.0 0.0 - 0.2 10e9/L    Diff Method Automated Method    Mononucleosis screen   Result Value Ref Range    Mononucleosis Screen Negative NEG^Negative   Influenza A/B antigen   Result Value Ref Range    Influenza A/B Agn Specimen Nasal     Influenza A Negative NEG^Negative    Influenza B Negative NEG^Negative         EKG - sinus bradycardia, normal axis, normal intervals, no acute ST/T changes c/w ischemia, no LVH by voltage criteria, unchanged from previous tracings  ASSESSMENT/PLAN:     1. Fatigue, unspecified type  pending labs, start abx for possible sinus infection given this seems to correlate the best with his symptoms although it is certainly not a clear-cut diagnosis.  This may also be an ill-defined viral infection and/or  medication side effects. reviewed red flag symptoms that would precipitate the need for routine, urgent or emergent f/u.   - Comprehensive metabolic panel  - UA reflex to Microscopic and Culture  - TSH with free T4 reflex  - CBC with platelets differential  - Mononucleosis screen  - Influenza A/B antigen    2. Bradycardia  Heart rate is a little lower today than his baseline he is currently feeling the same fatigue in the office today as he has been feeling all along so am doubtful that his heart rate is dropping much slower than that has.  No obvious heart block or other EKG abnormality.  However, pending labs, order holter monitor   - EKG 12-lead complete w/read - Clinics    Patient Instructions   Push more water.     Depending on your labs, we may start antibiotics for a possible sinus infection and consider a heart monitor.       Length of visit was 35 minutes with more than 50 percent of that time used for discussing medical concerns and education    The information in this document, created by the medical scribe for me, accurately reflects the services I personally performed and the decisions made by me. I have reviewed and approved this document for accuracy.   Suzanne Torres MD  [facility]

## 2019-03-26 ENCOUNTER — OFFICE VISIT (OUTPATIENT)
Dept: DERMATOLOGY | Facility: CLINIC | Age: 69
End: 2019-03-26
Payer: COMMERCIAL

## 2019-03-26 DIAGNOSIS — D48.5 NEOPLASM OF UNCERTAIN BEHAVIOR OF SKIN: ICD-10-CM

## 2019-03-26 DIAGNOSIS — Z85.828 HISTORY OF NONMELANOMA SKIN CANCER: Primary | ICD-10-CM

## 2019-03-26 DIAGNOSIS — L57.0 ACTINIC KERATOSIS: ICD-10-CM

## 2019-03-26 PROCEDURE — 99213 OFFICE O/P EST LOW 20 MIN: CPT | Performed by: DERMATOLOGY

## 2019-03-26 ASSESSMENT — PAIN SCALES - GENERAL: PAINLEVEL: NO PAIN (0)

## 2019-03-26 NOTE — NURSING NOTE
Kumar Gonzalez's goals for this visit include:   Chief Complaint   Patient presents with     RECHECK     Kumar is returning for a recheck; rash on face and chest.       He requests these members of his care team be copied on today's visit information:     PCP: Suzanne Torres    Referring Provider:  No referring provider defined for this encounter.    There were no vitals taken for this visit.    Do you need any medication refills at today's visit? No  Kavitha Elkins LPN

## 2019-03-26 NOTE — PROGRESS NOTES
[facility] Dermatology Note    Dermatology Problem List:  1. Family history of melanoma   2. SCC, right forearm, s/p excision 10/8/18  3. Actinic keratosis   4. Onychomycosis   - s/p terbinafine 250 mg fall 2015   5. Superficially invasive squamous cell carcinoma, left lateral calf s/p biopsy 12/3/18 - patient declined excision due to a history of infection following procedures, elected to monitor.     Encounter Date: Mar 26, 2019    CC:  Chief Complaint   Patient presents with     RECHECK     Kumar is returning for a recheck; rash on face and chest.         History of Present Illness:  Mr. Kumar Gonzalez is a 68 year old male who follows up for examination of a superficially invasive squamous cell carcinoma on the left lateral calf. He was last seen in dermatology on 12/3/18 with Dr. Moore when this lesion was biopsied. Due to a history of infection at his prior excision site on the right forearm, the patient was aversive to the idea of excising this lesion. Prior phone records indicated that he though the lesion had cleared up on its own as of 1/28/19. He again states that he does not want surgery today.     Dr. Moore made note of HAKs on the patient's right dorsal hand in his prior record - the patient reports today that he thinks these lesions have resolved.     In addition to the above, the patient would like to discuss a rash on his chest - denies pruritus in this area.     Pt's wife is present.     Past Medical History:   Patient Active Problem List   Diagnosis     Alopecia     Family history of stroke (cerebrovascular)     HYPERLIPIDEMIA LDL GOAL <130     Colon polyp     BPH (benign prostatic hyperplasia)     Loss of height     Erectile dysfunction     Impacted cerumen     Rosacea     Osteoporosis     Lentigo     Family history of melanoma     History of actinic keratosis     Xerosis cutis     Advanced directives, counseling/discussion     Vitamin D deficiency     KP (keratosis pilaris)      AK (actinic keratosis)     Anesthesia complication     Solar lentigo     Insomnia     Hydronephrosis, unspecified hydronephrosis type     Right knee pain     Past Medical History:   Diagnosis Date     Actinic keratosis      ALOPECIA NOS 3/24/2008     BPH (benign prostatic hyperplasia) 12/1/2010     Erectile dysfunction 12/1/2010     Hyperlipidemia LDL goal <130 10/31/2010     Seizure (H) 10/28/2008    after general anesthesia     Vitamin D deficiency 1/5/2012    Mild, noted 2011 and 2012     Past Surgical History:   Procedure Laterality Date     C APPENDECTOMY  8th Grade     CA ANESTH,CORNEAL TRANSPLANT  2008?     EYE SURGERY      iris surgery     HC PNEUMATIC RETINOPEXY  Multiple    Bilateral       Social History:  Social History     Social History     Marital status:      Spouse name: Jade Ramirez     Number of children: 2     Years of education: N/A     Occupational History      [employer]     Social History Main Topics     Smoking status: Never Smoker     Smokeless tobacco: Never Used     Alcohol use Yes      Comment: occasionaly     Drug use: No     Sexual activity: Yes     Partners: Female     Other Topics Concern      Service No     Blood Transfusions No     Caffeine Concern No     Occupational Exposure No     Hobby Hazards No     Sleep Concern No     Stress Concern No     Weight Concern No     Special Diet No     Back Care No     Exercise Yes     Seat Belt Yes     Self-Exams No     Social History Narrative    [employer]       Family History:  Family History   Problem Relation Age of Onset     Cerebrovascular Disease Father      Diabetes Paternal Grandfather      Cancer Brother         all uncles, and brother. Unsure type     Cancer Other         cousin with melanoma     Asthma No family hx of      C.A.D. No family hx of      Hypertension No family hx of      Breast Cancer No family hx of      Cancer - colorectal No family hx of      Prostate  Cancer No family hx of        Medications:  Current Outpatient Medications   Medication Sig Dispense Refill     acetaZOLAMIDE (DIAMOX) 250 MG tablet   0     amoxicillin-clavulanate (AUGMENTIN) 875-125 MG tablet Take 1 tablet by mouth 2 times daily 20 tablet 0     atorvastatin (LIPITOR) 40 MG tablet Take 1 tablet (40 mg) by mouth daily 90 tablet 3     Calcium Carbonate-Vitamin D (CALCIUM + D PO) Take by mouth daily 1200 MG/1000 MG D3       COMBIGAN 0.2-0.5 % ophthalmic solution   0     latanoprost (XALATAN) 0.005 % ophthalmic solution   0     metroNIDAZOLE (METROCREAM) 0.75 % cream Apply topically daily 45 g 11     ofloxacin (OCUFLOX) 0.3 % ophthalmic solution   0     prednisoLONE acetate (PRED FORTE) 1 % ophthalmic suspension        ValACYclovir HCl (VALTREX PO) Take 1,000 mg by mouth 2 times daily          No Known Allergies    Review of Systems:  -Skin: As per HPI.     Physical exam:  Vitals: There were no vitals taken for this visit.  GEN: This is a well developed, well-nourished male in no acute distress, in a pleasant mood.    SKIN: Total skin excluding the undergarment areas was performed. The exam included the head/face, neck, both arms, chest, back, abdomen, both legs, digits and/or nails. Normal exam of external buttocks.   - There are erythematous macules with overyling adherent scale on the right dorsal hand, back, forearms.   - Keratotic papule on the right lower paraspinal back  - Bright red erythematous macule on the right upper posterior arm, approx 5mm  - Keratotic papule on the left abdomen, 5 mm  - Bright red erythematous macule on the right upper posterior arm, approx. 5 mm  - No other lesions of concern on areas examined.       Impression/Plan:  1. SCC, left lateral calf s/p biopsy 3/26/18 - patient declines treatment at this time.     Reviewed the risks of foregoing treatment of this lesion long-term. He has had a number of eye surgeries in the recent past and feels overwhelmed with all of these  problems. After discussion of the risks of not following through with excision, the patient elects to follow up and discuss treatment at his next appointment. I reviewed the risks of death with this lesion including metastasis. Declined consult for radiation.     2. Actinic keratoses, right dorsal hand, back, forarems    Patient declines treatment. I Reviewed these are precancers and can evolving into skin cancers and he and his wife understood.     3.  Folliculitis, central chest    Start BPO wash.     4.  Keratotic papule on the right lower paraspinal back, 7 mm. Concern for SCC or SK.     Declined biopsy. I reviewed risks as above.    5.  Bright red erythematous macule on the right upper posterior arm, approx. 5 mm. Concern for BCC.    Declined biopsy. I reviewed risks as above as these are possibly skin cancers and he declines.      6.  Keratotic papule on the left abdomen, 5 mm. Concern for SCC.    Declined biopsy. I reviewed risks as above. I reviewed risks as above as these are possibly skin cancers and he declines.      Follow-up Dr. Diaz (SEE PHOTOS IN MEDIA TAB FOR NUB). They decline follow up earlier than Sept. I may be out at that time. They will follow up at that time with Dr. Diaz.       Staff Involved:  Staff/Scribe    Scribe Disclosure  I, Jamey Stoll, am serving as a scribe to document services personally performed by Dr. Misty Staples MD, based on data collection and the provider's statements to me.     Provider Disclosure:   The documentation recorded by the scribe accurately reflects the services I personally performed and the decisions made by me.    Misty Staples MD    Department of Dermatology  [facility]: Phone: [phone], Fax:[phone]  [facility]: Phone: [phone], Fax: [phone]

## 2019-03-26 NOTE — LETTER
3/26/2019         RE: Kumar Gonzalez  [address]  [facility] [address]        Dear Colleague,    Thank you for referring your patient, Kumar Gonzalez, to the [facility]. Please see a copy of my visit note below.    [facility] Dermatology Note    Dermatology Problem List:  1. Family history of melanoma   2. SCC, right forearm, s/p excision 10/8/18  3. Actinic keratosis   4. Onychomycosis   - s/p terbinafine 250 mg fall 2015   5. Superficially invasive squamous cell carcinoma, left lateral calf s/p biopsy 12/3/18 - patient declined excision due to a history of infection following procedures, elected to monitor.     Encounter Date: Mar 26, 2019    CC:  Chief Complaint   Patient presents with     RECHECK     Kumar is returning for a recheck; rash on face and chest.         History of Present Illness:  Mr. Kumar Gonzalez is a 68 year old male who follows up for examination of a superficially invasive squamous cell carcinoma on the left lateral calf. He was last seen in dermatology on 12/3/18 with Dr. Moore when this lesion was biopsied. Due to a history of infection at his prior excision site on the right forearm, the patient was aversive to the idea of excising this lesion. Prior phone records indicated that he though the lesion had cleared up on its own as of 1/28/19. He again states that he does not want surgery today.     Dr. Moore made note of HAKs on the patient's right dorsal hand in his prior record - the patient reports today that he thinks these lesions have resolved.     In addition to the above, the patient would like to discuss a rash on his chest - denies pruritus in this area.     Pt's wife is present.     Past Medical History:   Patient Active Problem List   Diagnosis     Alopecia     Family history of stroke (cerebrovascular)     HYPERLIPIDEMIA LDL GOAL <130     Colon polyp     BPH (benign prostatic hyperplasia)     Loss of height     Erectile dysfunction      Impacted cerumen     Rosacea     Osteoporosis     Lentigo     Family history of melanoma     History of actinic keratosis     Xerosis cutis     Advanced directives, counseling/discussion     Vitamin D deficiency     KP (keratosis pilaris)     AK (actinic keratosis)     Anesthesia complication     Solar lentigo     Insomnia     Hydronephrosis, unspecified hydronephrosis type     Right knee pain     Past Medical History:   Diagnosis Date     Actinic keratosis      ALOPECIA NOS 3/24/2008     BPH (benign prostatic hyperplasia) 12/1/2010     Erectile dysfunction 12/1/2010     Hyperlipidemia LDL goal <130 10/31/2010     Seizure (H) 10/28/2008    after general anesthesia     Vitamin D deficiency 1/5/2012    Mild, noted 2011 and 2012     Past Surgical History:   Procedure Laterality Date     C APPENDECTOMY  8th Grade     CA ANESTH,CORNEAL TRANSPLANT  2008?     EYE SURGERY      iris surgery     HC PNEUMATIC RETINOPEXY  Multiple    Bilateral       Social History:  Social History     Social History     Marital status:      Spouse name: Jade Ramirez     Number of children: 2     Years of education: N/A     Occupational History      [employer]     Social History Main Topics     Smoking status: Never Smoker     Smokeless tobacco: Never Used     Alcohol use Yes      Comment: occasionaly     Drug use: No     Sexual activity: Yes     Partners: Female     Other Topics Concern      Service No     Blood Transfusions No     Caffeine Concern No     Occupational Exposure No     Hobby Hazards No     Sleep Concern No     Stress Concern No     Weight Concern No     Special Diet No     Back Care No     Exercise Yes     Seat Belt Yes     Self-Exams No     Social History Narrative    Real-estate developer       Family History:  Family History   Problem Relation Age of Onset     Cerebrovascular Disease Father      Diabetes Paternal Grandfather      Cancer Brother         all uncles, and  brother. Unsure type     Cancer Other         cousin with melanoma     Asthma No family hx of      C.A.D. No family hx of      Hypertension No family hx of      Breast Cancer No family hx of      Cancer - colorectal No family hx of      Prostate Cancer No family hx of        Medications:  Current Outpatient Medications   Medication Sig Dispense Refill     acetaZOLAMIDE (DIAMOX) 250 MG tablet   0     amoxicillin-clavulanate (AUGMENTIN) 875-125 MG tablet Take 1 tablet by mouth 2 times daily 20 tablet 0     atorvastatin (LIPITOR) 40 MG tablet Take 1 tablet (40 mg) by mouth daily 90 tablet 3     Calcium Carbonate-Vitamin D (CALCIUM + D PO) Take by mouth daily 1200 MG/1000 MG D3       COMBIGAN 0.2-0.5 % ophthalmic solution   0     latanoprost (XALATAN) 0.005 % ophthalmic solution   0     metroNIDAZOLE (METROCREAM) 0.75 % cream Apply topically daily 45 g 11     ofloxacin (OCUFLOX) 0.3 % ophthalmic solution   0     prednisoLONE acetate (PRED FORTE) 1 % ophthalmic suspension        ValACYclovir HCl (VALTREX PO) Take 1,000 mg by mouth 2 times daily          No Known Allergies    Review of Systems:  -Skin: As per HPI.     Physical exam:  Vitals: There were no vitals taken for this visit.  GEN: This is a well developed, well-nourished male in no acute distress, in a pleasant mood.    SKIN: Total skin excluding the undergarment areas was performed. The exam included the head/face, neck, both arms, chest, back, abdomen, both legs, digits and/or nails. Normal exam of external buttocks.   - There are erythematous macules with overyling adherent scale on the right dorsal hand, back, forearms.   - Keratotic papule on the right lower paraspinal back  - Bright red erythematous macule on the right upper posterior arm, approx 5mm  - Keratotic papule on the left abdomen, 5 mm  - Bright red erythematous macule on the right upper posterior arm, approx. 5 mm  - No other lesions of concern on areas examined.       Impression/Plan:  1. SCC,  left lateral calf s/p biopsy 3/26/18 - patient declines treatment at this time.     Reviewed the risks of foregoing treatment of this lesion long-term. He has had a number of eye surgeries in the recent past and feels overwhelmed with all of these problems. After discussion of the risks of not following through with excision, the patient elects to follow up and discuss treatment at his next appointment. I reviewed the risks of death with this lesion including metastasis. Declined consult for radiation.     2. Actinic keratoses, right dorsal hand, back, forarems    Patient declines treatment. I Reviewed these are precancers and can evolving into skin cancers and he and his wife understood.     3.  Folliculitis, central chest    Start BPO wash.     4.  Keratotic papule on the right lower paraspinal back, 7 mm. Concern for SCC or SK.     Declined biopsy. I reviewed risks as above.    5.  Bright red erythematous macule on the right upper posterior arm, approx. 5 mm. Concern for BCC.    Declined biopsy. I reviewed risks as above as these are possibly skin cancers and he declines.      6.  Keratotic papule on the left abdomen, 5 mm. Concern for SCC.    Declined biopsy. I reviewed risks as above. I reviewed risks as above as these are possibly skin cancers and he declines.      Follow-up Dr. Diaz (SEE PHOTOS IN MEDIA TAB FOR NUB). They decline follow up earlier than Sept. I may be out at that time. They will follow up at that time with Dr. Diaz.       Staff Involved:  Staff/Scribe    Scribe Disclosure  I, Jamey Stoll, am serving as a scribe to document services personally performed by Dr. Misty Staples MD, based on data collection and the provider's statements to me.     Provider Disclosure:   The documentation recorded by the scribe accurately reflects the services I personally performed and the decisions made by me.    Misty Staples MD    Department of Dermatology  [facility]  [facility]: Phone: [phone], Fax:[phone]  [facility]: Phone: [phone], Fax: [phone]          Again, thank you for allowing me to participate in the care of your patient.        Sincerely,        Misty Staples MD

## 2019-03-26 NOTE — PATIENT INSTRUCTIONS
Start over-the-counter benzoyl peroxide 10% wash on the body.  If 10% is too irritating you can use the 5%. ([employer] makes this product. It is available here at the pharmacy or at [employer]). This medication can bleach your towels and clothing.     It is found in a purple tube in the acne aisle.

## 2019-03-27 ENCOUNTER — ANCILLARY PROCEDURE (OUTPATIENT)
Dept: CARDIOLOGY | Facility: CLINIC | Age: 69
End: 2019-03-27
Attending: FAMILY MEDICINE
Payer: COMMERCIAL

## 2019-03-27 DIAGNOSIS — R53.83 FATIGUE, UNSPECIFIED TYPE: ICD-10-CM

## 2019-03-27 DIAGNOSIS — R09.81 SINUS CONGESTION: ICD-10-CM

## 2019-03-27 PROCEDURE — 0298T ZIO PATCH HOLTER ADULT PEDIATRIC GREATER THAN 48 HRS: CPT | Performed by: FAMILY MEDICINE

## 2019-03-27 PROCEDURE — 0296T ZIO PATCH HOLTER ADULT PEDIATRIC GREATER THAN 48 HRS: CPT | Performed by: FAMILY MEDICINE

## 2019-03-27 NOTE — PATIENT INSTRUCTIONS
Patient has been prescribed a ZioPatch holter for 3 days.  Patient was instructed regarding the indication, function, care and prompt return of the ZioPatch holter monitor. The monitor, with S/N [id number],  was placed on the patient with instructions regarding care of the skin, electrodes, and monitor, as well as documentation in the patient diary. Patient demonstrated understanding of this information and [facility] or [facility].

## 2019-04-03 ENCOUNTER — THERAPY VISIT (OUTPATIENT)
Dept: PHYSICAL THERAPY | Facility: CLINIC | Age: 69
End: 2019-04-03
Payer: COMMERCIAL

## 2019-04-03 DIAGNOSIS — M25.561 RIGHT KNEE PAIN: ICD-10-CM

## 2019-04-03 PROCEDURE — 97110 THERAPEUTIC EXERCISES: CPT | Mod: GP | Performed by: PHYSICAL THERAPY ASSISTANT

## 2019-04-09 ENCOUNTER — TRANSFERRED RECORDS (OUTPATIENT)
Dept: HEALTH INFORMATION MANAGEMENT | Facility: CLINIC | Age: 69
End: 2019-04-09

## 2019-04-10 ENCOUNTER — TELEPHONE (OUTPATIENT)
Dept: FAMILY MEDICINE | Facility: CLINIC | Age: 69
End: 2019-04-10

## 2019-04-10 NOTE — TELEPHONE ENCOUNTER
This writer attempted to contact Kumar on 04/10/19      Reason for call Zio patch results and unable to leave message.      If patient calls back:   Relay message regarding zio patch results, (read verbatim), document that pt called and close encounter        Michelle Diaz

## 2019-04-11 NOTE — TELEPHONE ENCOUNTER
This writer attempted to contact Kumar on 04/11/19      Reason for call Zio Patch results and unable to leave message.      If patient calls back:   [facility] Team (MA/TC) called. Inform patient that someone from the team will contact them, document that pt called and route to care team.     Destiny Foster        Notes recorded by Suzanne Torres MD on 4/10/2019 at 11:06 AM CDT    Please update patient that nothing alarming was seen on his heart monitor that would explain his profound fatigue  Should f/u in the office if ongoing sx's.

## 2019-04-12 NOTE — TELEPHONE ENCOUNTER
Reason for Call:  Other     Detailed comments: patient returning call.    Phone Number Patient can be reached at: Home number on file [phone] (home)    Best Time: any    Can we leave a detailed message on this number? YES    Call taken on 4/12/2019 at 9:39 AM by Jahaira Bender

## 2019-04-12 NOTE — TELEPHONE ENCOUNTER
This writer attempted to contact Kumar on 04/12/19      Reason for call Zio Patch results and left message.      If patient calls back:   [facility] (MA/TC) called. Inform patient that someone from the team will contact them, document that pt called and route to care team.     Destiny Foster

## 2019-04-12 NOTE — TELEPHONE ENCOUNTER
Patient informed of zio patch results      Patient states he was taken off of the :  acetaZOLAMIDE (DIAMOX) 250 MG tablet    Has been off of this for 5 days and feeling so much better.  Informed to follow up if not continuing to improve.  Patient agreed.      FYI: to MD

## 2019-04-16 ENCOUNTER — THERAPY VISIT (OUTPATIENT)
Dept: PHYSICAL THERAPY | Facility: CLINIC | Age: 69
End: 2019-04-16
Payer: COMMERCIAL

## 2019-04-16 DIAGNOSIS — M25.561 RIGHT KNEE PAIN: ICD-10-CM

## 2019-04-16 PROCEDURE — 97110 THERAPEUTIC EXERCISES: CPT | Mod: GP | Performed by: PHYSICAL THERAPY ASSISTANT

## 2019-04-16 ASSESSMENT — ACTIVITIES OF DAILY LIVING (ADL)
STAND: ACTIVITY IS NOT DIFFICULT
WALK: ACTIVITY IS NOT DIFFICULT
KNEEL ON THE FRONT OF YOUR KNEE: ACTIVITY IS NOT DIFFICULT
GIVING WAY, BUCKLING OR SHIFTING OF KNEE: I DO NOT HAVE THE SYMPTOM
SIT WITH YOUR KNEE BENT: ACTIVITY IS NOT DIFFICULT
LIMPING: I DO NOT HAVE THE SYMPTOM
GO UP STAIRS: ACTIVITY IS NOT DIFFICULT
HOW_WOULD_YOU_RATE_THE_OVERALL_FUNCTION_OF_YOUR_KNEE_DURING_YOUR_USUAL_DAILY_ACTIVITIES?: NORMAL
KNEE_ACTIVITY_OF_DAILY_LIVING_SCORE: 98.57
PAIN: I DO NOT HAVE THE SYMPTOM
RAW_SCORE: 69
WEAKNESS: I HAVE THE SYMPTOM BUT IT DOES NOT AFFECT MY ACTIVITY
KNEE_ACTIVITY_OF_DAILY_LIVING_SUM: 69
SQUAT: ACTIVITY IS NOT DIFFICULT
STIFFNESS: I DO NOT HAVE THE SYMPTOM
RISE FROM A CHAIR: ACTIVITY IS NOT DIFFICULT
GO DOWN STAIRS: ACTIVITY IS NOT DIFFICULT
SWELLING: I DO NOT HAVE THE SYMPTOM
HOW_WOULD_YOU_RATE_THE_CURRENT_FUNCTION_OF_YOUR_KNEE_DURING_YOUR_USUAL_DAILY_ACTIVITIES_ON_A_SCALE_FROM_0_TO_100_WITH_100_BEING_YOUR_LEVEL_OF_KNEE_FUNCTION_PRIOR_TO_YOUR_INJURY_AND_0_BEING_THE_INABILITY_TO_PERFORM_ANY_OF_YOUR_USUAL_DAILY_ACTIVITIES?: 100
AS_A_RESULT_OF_YOUR_KNEE_INJURY,_HOW_WOULD_YOU_RATE_YOUR_CURRENT_LEVEL_OF_DAILY_ACTIVITY?: NORMAL

## 2019-04-16 NOTE — PROGRESS NOTES
PROGRESS  REPORT    Progress reporting period is from 2-6-19 to 4-16-19.       SUBJECTIVE  Subjective changes noted by patient:  Patient reports doing LAQ before and after bed and has made a good difference. Was able to be on a job site this morning for the first time (due to eye complications he has been dealing with over past 6 weeks or so) and tolerated well-no knee issue and able to do full flights of stairs. With hesitance of reinjury feels his knee is doing really well. Overall feeling like knee is 100% of normal at this time and feels ready to continue on his own.     Current Pain level: 0/10.     Previous pain level was  0/10  .   Changes in function:  Yes (See Goal flowsheet attached for changes in current functional level)  Adverse reaction to treatment or activity: None    OBJECTIVE  Changes noted in objective findings:  Yes, pt now has full painfree ROM. Flexion has improved 6 degrees.  Increased strength for knee flexion and extension to full and painfree.     Today's Objective: Full and painfree ROM; MMT Flex and ext 5/5 painfree; 7 inch step painfree     ASSESSMENT/PLAN  Updated problem list and treatment plan: Diagnosis 1:  R knee pain  Pain -  self management, education, directional preference exercise and home program  Decreased strength - therapeutic exercise, therapeutic activities and home program  Decreased function - therapeutic activities and home program  STG/LTGs have been met or progress has been made towards goals:  Yes (See Goal flow sheet completed today.)  Assessment of Progress: The patient's condition is improving.  The patient's condition has potential to improve.  The patient has met all of their long term goals.  Patient decided to self discharge after being seen by an extender.  G codes could not be reported by the therapist.  Self Management Plans:  Patient is independent in a home treatment program.  Patient is independent in self management of symptoms.  I have discussed this  patient with primary PT and find that the nature, scope, duration and intensity of the therapy is appropriate for the medical condition of the patient.  Kumar continues to require the following intervention to meet STG and LTG's:  PT intervention is no longer required to meet STG/LTG.    Recommendations:  Pt will continue with I HEP at this time. Will leave chart open until end of May 2019 in case of need to return. If does not return by then will formally discharge pt chart.   The progress note/discharge summary was written in collaboration with and reviewed by the physical therapist.    Please refer to the daily flowsheet for treatment today, total treatment time and time spent performing 1:1 timed codes.

## 2019-11-06 PROBLEM — M25.561 RIGHT KNEE PAIN: Status: RESOLVED | Noted: 2019-02-06 | Resolved: 2019-11-06

## 2019-12-16 ENCOUNTER — ALLIED HEALTH/NURSE VISIT (OUTPATIENT)
Dept: NURSING | Facility: CLINIC | Age: 69
End: 2019-12-16
Payer: COMMERCIAL

## 2019-12-16 DIAGNOSIS — Z23 NEED FOR PROPHYLACTIC VACCINATION AND INOCULATION AGAINST INFLUENZA: Primary | ICD-10-CM

## 2019-12-16 PROCEDURE — 90662 IIV NO PRSV INCREASED AG IM: CPT

## 2019-12-16 PROCEDURE — G0008 ADMIN INFLUENZA VIRUS VAC: HCPCS | Mod: 59

## 2019-12-16 PROCEDURE — 90715 TDAP VACCINE 7 YRS/> IM: CPT

## 2019-12-16 PROCEDURE — 90471 IMMUNIZATION ADMIN: CPT

## 2019-12-16 PROCEDURE — 99207 ZZC NO CHARGE NURSE ONLY: CPT

## 2020-02-13 NOTE — LETTER
1/31/2019         RE: Kumar Gonzalez  [address]  [facility] [address]        Dear Colleague,    Thank you for referring your patient, Kumar Gonzalez, to the [facility]. Please see a copy of my visit note below.    HISTORY OF PRESENT ILLNESS  Mr. Gonzalez is a pleasant 68 year old year old male following up with right knee pain.  I last saw Kumar in early October of last year for what appeared to be peds anserine bursitis.  His pain originally improved with the use of a brace and topicals.  His pain did come back, although not as quite as severe as before.  Since the year started he has been able to ignore his pain, however, it has always been present to some degree.  He points to the medial portion of his knee, worse when twisting his knee and sitting on his foot.  No swelling, no numbness or tingling.  Additional history: as documented      REVIEW OF SYSTEMS (1/31/2019)  10 point ROS of systems including Constitutional, Eyes, Respiratory, Cardiovascular, Gastroenterology, Genitourinary, Integumentary, Musculoskeletal, Psychiatric were all negative except for pertinent positives noted in my HPI.     PHYSICAL EXAM  Vitals:    01/31/19 1351   BP: 109/82   BP Location: Left arm   Patient Position: Chair   Cuff Size: Adult Regular   Pulse: 66   SpO2: 97%     General  - normal appearance, in no obvious distress  CV  - normal popliteal pulse  Pulm  - normal respiratory pattern, non-labored  Musculoskeletal - right knee  - stance: normal gait without limp  - inspection: no effusion, no ecchymosis, normal muscle tone, normal bone and joint alignment, no obvious deformity  - palpation: no joint line tenderness, patella and patellar tendon non-tender, normal popliteal pulse  - ROM: 135 degrees flexion, -5 degrees extension  - strength: 5/5 in flexion, 5/5 in extension  - neuro: no sensory or motor deficit  - special tests:  (-) Lachman  (+) Iva  (-) varus at 0 and 30 degrees flexion  (-)  Allergic rhinitis persists. The patient continues to decline therapy at this time. She has opted to take over-the-counter medications as directed. valgus at 0 and 30 degrees flexion  Neuro  - no sensory or motor deficit, grossly normal coordination, normal muscle tone  Skin  - no ecchymosis, erythema, warmth, or induration, no obvious rash  Psych  - interactive, appropriate, normal mood and affect          ASSESSMENT & PLAN  Mr. Gonzalez is a 68 year old year old male following up with right knee pain.    I ordered & reviewed an xray of his knee which appears normal.  There is minimal to no osteoarthritis.    I do think that Kumar will do great in physical therapy, I am placing the referral.  He can get this set up at his earliest convenience.    If Kumar does well we can follow-up as needed, if he experiences worsening pain we should follow-up in the next 4-6 weeks.  If this is the case I would consider an intra-articular injection or an MRI.    It was a pleasure seeing Kumar.        Dagoberto Plaza DO, [facility]            Again, thank you for allowing me to participate in the care of your patient.        Sincerely,        Dagoberto Plaza DO

## 2020-05-26 DIAGNOSIS — E78.5 HYPERLIPIDEMIA LDL GOAL <130: ICD-10-CM

## 2020-05-28 RX ORDER — ATORVASTATIN CALCIUM 40 MG/1
TABLET, FILM COATED ORAL
Qty: 90 TABLET | Refills: 0 | Status: SHIPPED | OUTPATIENT
Start: 2020-05-28 | End: 2020-09-15

## 2020-05-28 NOTE — TELEPHONE ENCOUNTER
Team call and schedule the patient for a visit. Please ask if there is enough medication/supplies to last till scheduled appointment. Then route back to RN's.    Abby Andujar RN, [facility] Triage

## 2020-06-02 ENCOUNTER — TELEPHONE (OUTPATIENT)
Dept: FAMILY MEDICINE | Facility: CLINIC | Age: 70
End: 2020-06-02

## 2020-06-02 NOTE — PROGRESS NOTES
"Kumar Gonzalez is a 69 year old male who is being evaluated via a billable telephone visit.      The patient has been notified of following:     \"This telephone visit will be conducted via a call between you and your physician/provider. We have found that certain health care needs can be provided without the need for a physical exam.  This service lets us provide the care you need with a short phone conversation.  If a prescription is necessary we can send it directly to your pharmacy.  If lab work is needed we can place an order for that and you can then stop by our lab to have the test done at a later time.    Telephone visits are billed at different rates depending on your insurance coverage. During this emergency period, for some insurers they may be billed the same as an in-person visit.  Please reach out to your insurance provider with any questions.    If during the course of the call the physician/provider feels a telephone visit is not appropriate, you will not be charged for this service.\"    Patient has given verbal consent for Telephone visit?  Yes    What phone number would you like to be contacted at? [phone]    How would you like to obtain your AVS? Mail a copy    Subjective     Kumar Gonzalez is a 69 year old male who presents via phone visit today for the following health issues:    HPI     Hyperlipidemia Follow-Up      Are you regularly taking any medication or supplement to lower your cholesterol?   Yes- atorvastatin    Are you having muscle aches or other side effects that you think could be caused by your cholesterol lowering medication?  No      How many servings of fruits and vegetables do you eat daily?  2-3    On average, how many sweetened beverages do you drink each day (Examples: soda, juice, sweet tea, etc.  Do NOT count diet or artificially sweetened beverages)?   0    How many days per week do you exercise enough to make your heart beat faster? 3 or less    How many minutes a day do you " exercise enough to make your heart beat faster? 9 or less    How many days per week do you miss taking your medication? 0    follwos with endocrine for osteopenia + fractures.  Last DEXA report and endocrinology visit reviewed.  No medications recommended but continue calcium and weightbearing exercises have been reviewed  Next dexa due per 4/2021    Working many hours since pandemic started    Considering 3rd cornea transplant. Has seen several eye docs for several opinions.     Thinks only drinking 20 oz of water per day. gfr has been borderline low in past. Admits he needs to drink much more    Interested in PSA yearly. Hx of bph    Was seen at [facility] for abnl CT lung, RML syndrome. Patient reports that extensive testing done and thought to be changes maybe congenital or not acute and no further follow-up was needed    Walking extensively right now for exercise.     Overall feeling his health is very good.   Severe fatigue resolved with stopping of the acetazolamide in past- asking for this to be noted in his chart.  I added this to his allergy list    Patient Active Problem List   Diagnosis     Alopecia     Family history of stroke (cerebrovascular)     HYPERLIPIDEMIA LDL GOAL <130     Colon polyp     BPH (benign prostatic hyperplasia)     Loss of height     Erectile dysfunction     Impacted cerumen     Rosacea     Osteoporosis     Lentigo     Family history of melanoma     History of actinic keratosis     Xerosis cutis     Advanced directives, counseling/discussion     Vitamin D deficiency     KP (keratosis pilaris)     AK (actinic keratosis)     Anesthesia complication     Solar lentigo     Insomnia     Hydronephrosis, unspecified hydronephrosis type     Past Surgical History:   Procedure Laterality Date     C APPENDECTOMY  8th Grade     CA ANESTH,CORNEAL TRANSPLANT  2008?     EYE SURGERY      iris surgery     HC PNEUMATIC RETINOPEXY  Multiple    Bilateral       Social History     Tobacco Use     Smoking status:  Never Smoker     Smokeless tobacco: Never Used   Substance Use Topics     Alcohol use: Yes     Comment: occasionaly     Family History   Problem Relation Age of Onset     Cerebrovascular Disease Father      Diabetes Paternal Grandfather      Cancer Brother         all uncles, and brother. Unsure type     Cancer Other         cousin with melanoma     Asthma No family hx of      C.A.D. No family hx of      Hypertension No family hx of      Breast Cancer No family hx of      Cancer - colorectal No family hx of      Prostate Cancer No family hx of          Current Outpatient Medications   Medication Sig Dispense Refill     atorvastatin (LIPITOR) 40 MG tablet TAKE 1 TABLET BY MOUTH ONCE DAILY  90 tablet 0     Calcium Carbonate-Vitamin D (CALCIUM + D PO) Take by mouth daily 1200 MG/1000 MG D3       COMBIGAN 0.2-0.5 % ophthalmic solution   0     latanoprost (XALATAN) 0.005 % ophthalmic solution   0     ofloxacin (OCUFLOX) 0.3 % ophthalmic solution   0     prednisoLONE acetate (PRED FORTE) 1 % ophthalmic suspension        ValACYclovir HCl (VALTREX PO) Take 1,000 mg by mouth 2 times daily        acetaZOLAMIDE (DIAMOX) 250 MG tablet   0     amoxicillin-clavulanate (AUGMENTIN) 875-125 MG tablet Take 1 tablet by mouth 2 times daily (Patient not taking: Reported on 6/3/2020) 20 tablet 0     metroNIDAZOLE (METROCREAM) 0.75 % cream Apply topically daily (Patient not taking: Reported on 6/3/2020) 45 g 11     No Known Allergies    Reviewed and updated as needed this visit by Provider         Review of Systems   Constitutional, HEENT, cardiovascular, pulmonary, gi and gu systems are negative, except as otherwise noted.       Objective   Reported vitals:  There were no vitals taken for this visit.   healthy, alert and no distress  PSYCH: Alert and oriented times 3; coherent speech, normal   rate and volume, able to articulate logical thoughts, able   to abstract reason, no tangential thoughts, no hallucinations   or delusions  His  affect is normal  RESP: No cough, no audible wheezing, able to talk in full sentences  Remainder of exam unable to be completed due to telephone visits    Diagnostic Test Results:  Labs reviewed in Epic        Assessment/Plan:  1. Hyperlipidemia LDL goal <130  On stati  - Lipid panel reflex to direct LDL Fasting; Future  - Comprehensive metabolic panel (BMP + Alb, Alk Phos, ALT, AST, Total. Bili, TP); Future    3. Screening for prostate cancer  We will have PSA drawn  - PSA, screen; Future    4. Osteoporosis, unspecified osteoporosis type, unspecified pathological fracture presence  We reviewed weightbearing exercises, calcium and vitamin D intake and plan for DEXA next year    5. Advanced directives, counseling/discussion  He will review this and send a copy to our office    6. Right middle lobe syndrome  Patient reports extensive work-up was negative for concern and no further follow-up needed    7. Decreased GFR  Possibly due to prerenal hypovolemic status from poor hydration.  Recommend he hydrate really well the couple days before his lab visit and on the morning of draw to tease this out.  - Comprehensive metabolic panel (BMP + Alb, Alk Phos, ALT, AST, Total. Bili, TP); Future    Return in about 1 year (around 6/3/2021).    Patient Instructions   Please send us a copy of your advanced directive    Schedule fasting labs.       Phone call duration:  13 minutes    Suzanne Torres MD

## 2020-06-02 NOTE — TELEPHONE ENCOUNTER
"Patient scheduled for 6/1/20 appointment for medication recheck.   Patient contacted for appointment and states \"I forgot about appointment.  I have a 9:00 am conference call I can not miss.  Can we reschedule?\"   When asked when patient would like to reschedule for patient states \"I don't know, when will these riots be over?\"   Patient then placed phone down, took another call, picked phone back up and states \"pick something tomorrow and let me know.\"    Patient rescheduled for phone visit with same provider tomorrow.  Patient needs to be informed of appointment time to confirm if appointment will work as patient was unavailable by phone, and unable to leave a message after scheduling.      Sudarshan Benjamin, CMA    "

## 2020-06-03 ENCOUNTER — VIRTUAL VISIT (OUTPATIENT)
Dept: FAMILY MEDICINE | Facility: CLINIC | Age: 70
End: 2020-06-03
Payer: COMMERCIAL

## 2020-06-03 DIAGNOSIS — Z71.89 ADVANCED DIRECTIVES, COUNSELING/DISCUSSION: ICD-10-CM

## 2020-06-03 DIAGNOSIS — R94.4 DECREASED GFR: ICD-10-CM

## 2020-06-03 DIAGNOSIS — M81.0 OSTEOPOROSIS, UNSPECIFIED OSTEOPOROSIS TYPE, UNSPECIFIED PATHOLOGICAL FRACTURE PRESENCE: ICD-10-CM

## 2020-06-03 DIAGNOSIS — Z12.5 SCREENING FOR PROSTATE CANCER: ICD-10-CM

## 2020-06-03 DIAGNOSIS — J98.19 RIGHT MIDDLE LOBE SYNDROME: ICD-10-CM

## 2020-06-03 DIAGNOSIS — E78.5 HYPERLIPIDEMIA LDL GOAL <130: Primary | ICD-10-CM

## 2020-06-03 PROCEDURE — 99214 OFFICE O/P EST MOD 30 MIN: CPT | Mod: 95 | Performed by: FAMILY MEDICINE

## 2020-06-15 DIAGNOSIS — Z12.5 SCREENING FOR PROSTATE CANCER: ICD-10-CM

## 2020-06-15 DIAGNOSIS — E78.5 HYPERLIPIDEMIA LDL GOAL <130: ICD-10-CM

## 2020-06-15 DIAGNOSIS — R94.4 DECREASED GFR: ICD-10-CM

## 2020-06-15 PROBLEM — N18.30 CKD (CHRONIC KIDNEY DISEASE) STAGE 3, GFR 30-59 ML/MIN (H): Status: ACTIVE | Noted: 2020-06-15

## 2020-06-15 LAB
ALBUMIN SERPL-MCNC: 3.8 G/DL (ref 3.4–5)
ALP SERPL-CCNC: 74 U/L (ref 40–150)
ALT SERPL W P-5'-P-CCNC: 28 U/L (ref 0–70)
ANION GAP SERPL CALCULATED.3IONS-SCNC: 4 MMOL/L (ref 3–14)
AST SERPL W P-5'-P-CCNC: 22 U/L (ref 0–45)
BILIRUB SERPL-MCNC: 0.8 MG/DL (ref 0.2–1.3)
BUN SERPL-MCNC: 23 MG/DL (ref 7–30)
CALCIUM SERPL-MCNC: 9 MG/DL (ref 8.5–10.1)
CHLORIDE SERPL-SCNC: 108 MMOL/L (ref 94–109)
CHOLEST SERPL-MCNC: 128 MG/DL
CO2 SERPL-SCNC: 28 MMOL/L (ref 20–32)
CREAT SERPL-MCNC: 1.25 MG/DL (ref 0.66–1.25)
GFR SERPL CREATININE-BSD FRML MDRD: 58 ML/MIN/{1.73_M2}
GLUCOSE SERPL-MCNC: 92 MG/DL (ref 70–99)
HDLC SERPL-MCNC: 44 MG/DL
LDLC SERPL CALC-MCNC: 66 MG/DL
NONHDLC SERPL-MCNC: 84 MG/DL
POTASSIUM SERPL-SCNC: 4.4 MMOL/L (ref 3.4–5.3)
PROT SERPL-MCNC: 7.1 G/DL (ref 6.8–8.8)
PSA SERPL-ACNC: 1.8 UG/L (ref 0–4)
SODIUM SERPL-SCNC: 140 MMOL/L (ref 133–144)
TRIGL SERPL-MCNC: 92 MG/DL

## 2020-06-15 PROCEDURE — 80053 COMPREHEN METABOLIC PANEL: CPT | Performed by: FAMILY MEDICINE

## 2020-06-15 PROCEDURE — 36415 COLL VENOUS BLD VENIPUNCTURE: CPT | Performed by: FAMILY MEDICINE

## 2020-06-15 PROCEDURE — G0103 PSA SCREENING: HCPCS | Performed by: FAMILY MEDICINE

## 2020-06-15 PROCEDURE — 80061 LIPID PANEL: CPT | Performed by: FAMILY MEDICINE

## 2020-09-11 DIAGNOSIS — E78.5 HYPERLIPIDEMIA LDL GOAL <130: ICD-10-CM

## 2020-09-15 RX ORDER — ATORVASTATIN CALCIUM 40 MG/1
TABLET, FILM COATED ORAL
Qty: 90 TABLET | Refills: 1 | Status: SHIPPED | OUTPATIENT
Start: 2020-09-15 | End: 2021-04-06

## 2020-09-15 NOTE — TELEPHONE ENCOUNTER
"Requested Prescriptions   Pending Prescriptions Disp Refills     atorvastatin (LIPITOR) 40 MG tablet [Pharmacy Med Name: Atorvastatin Calcium Oral Tablet 40 MG] 90 tablet 0     Sig: TAKE 1 TABLET BY MOUTH ONE TIME DAILY       Statins Protocol Passed - 9/15/2020  9:06 AM        Passed - LDL on file in past 12 months     Recent Labs   Lab Test 06/15/20  0921   LDL 66             Passed - No abnormal creatine kinase in past 12 months     Recent Labs   Lab Test 05/04/18  0658   CKT 67                Passed - Recent (12 mo) or future (30 days) visit within the authorizing provider's specialty     Patient has had an office visit with the authorizing provider or a provider within the authorizing providers department within the previous 12 mos or has a future within next 30 days. See \"Patient Info\" tab in inbasket, or \"Choose Columns\" in Meds & Orders section of the refill encounter.              Passed - Medication is active on med list        Passed - Patient is age 18 or older           Prescription approved per [facility] Refill Protocol.      Kenrick Roca RN, BSN, PHN    "

## 2021-01-15 ENCOUNTER — TRANSFERRED RECORDS (OUTPATIENT)
Dept: HEALTH INFORMATION MANAGEMENT | Facility: CLINIC | Age: 71
End: 2021-01-15

## 2021-02-14 ENCOUNTER — HEALTH MAINTENANCE LETTER (OUTPATIENT)
Age: 71
End: 2021-02-14

## 2021-04-03 DIAGNOSIS — E78.5 HYPERLIPIDEMIA LDL GOAL <130: ICD-10-CM

## 2021-04-06 RX ORDER — ATORVASTATIN CALCIUM 40 MG/1
TABLET, FILM COATED ORAL
Qty: 90 TABLET | Refills: 0 | Status: SHIPPED | OUTPATIENT
Start: 2021-04-06 | End: 2021-07-27

## 2021-04-29 ENCOUNTER — TELEPHONE (OUTPATIENT)
Dept: AUDIOLOGY | Facility: CLINIC | Age: 71
End: 2021-04-29

## 2021-04-29 NOTE — TELEPHONE ENCOUNTER
[facility] Call Center    Phone Message    May a detailed message be left on voicemail: no     Reason for Call: Other: Sherlyn from [facility] [place] had requested audiology exam 14 days ago from our Medical Records.  They have not received this information yet and is asking if office can send audiiology exam from 8.23.2016.  Please let Maritza know one way or another.  Fax: [phone], phone: [phone], option 2.      Action Taken: Message routed to:  Adult Clinics: ENT p [id number]    Travel Screening: Not Applicable

## 2021-04-29 NOTE — TELEPHONE ENCOUNTER
Audiogram faxed as requested.  Per 4/15/21 [facility] visit note, pt would like to compare 8/2016 audiogram with 4/15/21 audiogram. Maritza Almodovar RN

## 2021-07-26 DIAGNOSIS — E78.5 HYPERLIPIDEMIA LDL GOAL <130: ICD-10-CM

## 2021-07-27 RX ORDER — ATORVASTATIN CALCIUM 40 MG/1
40 TABLET, FILM COATED ORAL DAILY
Qty: 90 TABLET | Refills: 0 | Status: SHIPPED | OUTPATIENT
Start: 2021-07-27 | End: 2021-11-29

## 2021-07-27 NOTE — TELEPHONE ENCOUNTER
Routing refill request to provider for review/approval because:  Isabel given x1 and patient did not follow up, please advise  Labs not current:  LDL  Patient needs to be seen because it has been more than 1 year since last office visit.      Roxane Fields RN  [facility]

## 2021-09-25 ENCOUNTER — HEALTH MAINTENANCE LETTER (OUTPATIENT)
Age: 71
End: 2021-09-25

## 2021-11-27 ENCOUNTER — TELEPHONE (OUTPATIENT)
Dept: FAMILY MEDICINE | Facility: CLINIC | Age: 71
End: 2021-11-27
Payer: COMMERCIAL

## 2021-11-27 DIAGNOSIS — E78.5 HYPERLIPIDEMIA LDL GOAL <130: ICD-10-CM

## 2021-11-29 RX ORDER — ATORVASTATIN CALCIUM 40 MG/1
TABLET, FILM COATED ORAL
Qty: 15 TABLET | Refills: 0 | Status: SHIPPED | OUTPATIENT
Start: 2021-11-29 | End: 2021-12-06

## 2021-11-29 NOTE — TELEPHONE ENCOUNTER
Routing refill request to provider for review/approval because:  Isabel given x1 and patient did not follow up, please advise  Eileen Walker BSN, RN

## 2021-11-30 NOTE — TELEPHONE ENCOUNTER
2 week Isabel refill sent  Due for med check prior to further refills (virutal visit is ok)  Please send reminder

## 2021-11-30 NOTE — TELEPHONE ENCOUNTER
Patient calling checking on the refill. Patient will call to schedule the appt.  Stacie Elizabeth [facility]  2nd Floor  Primary Care

## 2021-12-06 ENCOUNTER — VIRTUAL VISIT (OUTPATIENT)
Dept: FAMILY MEDICINE | Facility: CLINIC | Age: 71
End: 2021-12-06
Payer: COMMERCIAL

## 2021-12-06 DIAGNOSIS — N18.31 STAGE 3A CHRONIC KIDNEY DISEASE (H): ICD-10-CM

## 2021-12-06 DIAGNOSIS — Z82.3 FAMILY HISTORY OF STROKE (CEREBROVASCULAR): ICD-10-CM

## 2021-12-06 DIAGNOSIS — E78.5 HYPERLIPIDEMIA LDL GOAL <130: Primary | ICD-10-CM

## 2021-12-06 DIAGNOSIS — M81.0 OSTEOPOROSIS, UNSPECIFIED OSTEOPOROSIS TYPE, UNSPECIFIED PATHOLOGICAL FRACTURE PRESENCE: ICD-10-CM

## 2021-12-06 PROCEDURE — 99214 OFFICE O/P EST MOD 30 MIN: CPT | Mod: 95 | Performed by: FAMILY MEDICINE

## 2021-12-06 RX ORDER — ATORVASTATIN CALCIUM 40 MG/1
TABLET, FILM COATED ORAL
Qty: 90 TABLET | Refills: 1 | Status: SHIPPED | OUTPATIENT
Start: 2021-12-06 | End: 2022-04-12

## 2021-12-06 NOTE — PROGRESS NOTES
Kumar is a 71 year old who is being evaluated via a billable video visit.      How would you like to obtain your AVS? Mail a copy  If the video visit is dropped, the invitation should be resent by: Send to e-mail at: [email or web address]  Will anyone else be joining your video visit? No    Video Start Time: 5:28 PM    Assessment & Plan     Hyperlipidemia LDL goal <130  Doing well on statin and lipids utd through [place]. Refill given. Will recheck lipids at [facility] in spring  - atorvastatin (LIPITOR) 40 MG tablet; TAKE 1 TABLET BY MOUTH ONE TIME DAILY    Osteoporosis, unspecified osteoporosis type, unspecified pathological fracture presence  Reviewed dexa done at [place]. Numbers improved and stable. Continue calcium and exercise.     Stage 3a chronic kidney disease (H)  Renal fx stable with [place] labs    Family history of stroke (cerebrovascular)  Reviewed need for carotid us intermittently. No new neuro deficits. He will wait on getting till spring and declined the order today.     Toenail changes  ? Return of fungus. He is not motivated to restart oral meds (was on lamisil for extended period) in past. Reviewed topical options and he will try a few otc things and come in if not clearing.                    No follow-ups on file.    Suzanne Torres MD  [facility]   Kumar is a 71 year old who presents for the following health issues     History of Present Illness       He eats 0-1 servings of fruits and vegetables daily.He consumes 0 sweetened beverage(s) daily.He exercises with enough effort to increase his heart rate 9 or less minutes per day.  He exercises with enough effort to increase his heart rate 3 or less days per week. He is missing 1 dose(s) of medications per week.     Need refill for statin    Every other year goes to [place] for annual exam- had lipids done at that time.     On valtrex for eyes. Will be starting a cocktail of drops.   Left eye vision is still  very limited.     One-toenail- fungal infxn starting to come back.   Was on     Had Moh's surgery for skin cancer on his face.   Sees derm specialist every six months now.     At [facility] told bone density was good.   Still taking calcium daily    Will get carotid us in spring.           Objective           Vitals:  No vitals were obtained today due to virtual visit.    Physical Exam   GENERAL: Healthy, alert and no distress  EYES: Eyes grossly normal to inspection.  No discharge or erythema, or obvious scleral/conjunctival abnormalities.  RESP: No audible wheeze, cough, or visible cyanosis.  No visible retractions or increased work of breathing.    SKIN: Visible skin clear. No significant rash, abnormal pigmentation or lesions.  NEURO: Cranial nerves grossly intact.  Mentation and speech appropriate for age.  PSYCH: Mentation appears normal, affect normal/bright, judgement and insight intact, normal speech and appearance well-groomed.              Video-Visit Details    Type of service:  Video Visit    Video End Time:5:41 PM    Originating Location (pt. Location): Home    Distant Location (provider location):  [facility]     Platform used for Video Visit: UserApp

## 2022-03-12 ENCOUNTER — HEALTH MAINTENANCE LETTER (OUTPATIENT)
Age: 72
End: 2022-03-12

## 2022-04-09 ENCOUNTER — TELEPHONE (OUTPATIENT)
Dept: FAMILY MEDICINE | Facility: CLINIC | Age: 72
End: 2022-04-09
Payer: COMMERCIAL

## 2022-04-09 NOTE — TELEPHONE ENCOUNTER
Please arrange for virtual visit for this patient to address toe concern. Or, he could also see derm for this issue. Thanks!

## 2022-04-09 NOTE — TELEPHONE ENCOUNTER
----- Message from Alysa Bland sent at 4/7/2022  8:30 AM CDT -----  Regarding: Toe Concern  Good Morning Kumar Roca came in today and informed me that the toe nail issue you saw him a few years ago to treat has returned. When I was trying to schedule him for an appointment, he was concerned about not being able to see you before the issue got worse. He requested I send you a message to ask if the prescription you sent him to resolve the issue could be resent, or if he needs to make another appointment.    Thank you,    Alysa Bland [facility]

## 2022-04-11 NOTE — TELEPHONE ENCOUNTER
Is it okay for pt to wait 2 weeks for 40 minute virtual, or can this be done in a 20 minute virtual spot? Let me know, thanks!

## 2022-04-12 ENCOUNTER — VIRTUAL VISIT (OUTPATIENT)
Dept: FAMILY MEDICINE | Facility: CLINIC | Age: 72
End: 2022-04-12
Payer: COMMERCIAL

## 2022-04-12 DIAGNOSIS — B35.1 ONYCHOMYCOSIS: Primary | ICD-10-CM

## 2022-04-12 DIAGNOSIS — E78.5 HYPERLIPIDEMIA LDL GOAL <130: ICD-10-CM

## 2022-04-12 DIAGNOSIS — N18.31 STAGE 3A CHRONIC KIDNEY DISEASE (H): ICD-10-CM

## 2022-04-12 DIAGNOSIS — Z12.5 SCREENING FOR PROSTATE CANCER: ICD-10-CM

## 2022-04-12 DIAGNOSIS — Z13.220 SCREENING FOR HYPERLIPIDEMIA: ICD-10-CM

## 2022-04-12 DIAGNOSIS — Z12.11 SCREEN FOR COLON CANCER: ICD-10-CM

## 2022-04-12 PROCEDURE — 99214 OFFICE O/P EST MOD 30 MIN: CPT | Mod: 95 | Performed by: FAMILY MEDICINE

## 2022-04-12 RX ORDER — ATORVASTATIN CALCIUM 40 MG/1
TABLET, FILM COATED ORAL
Qty: 90 TABLET | Refills: 3 | Status: SHIPPED | OUTPATIENT
Start: 2022-04-12 | End: 2023-07-14

## 2022-04-12 RX ORDER — TERBINAFINE HYDROCHLORIDE 250 MG/1
250 TABLET ORAL DAILY
Qty: 90 TABLET | Refills: 0 | Status: SHIPPED | OUTPATIENT
Start: 2022-04-12 | End: 2022-07-21

## 2022-04-12 NOTE — PATIENT INSTRUCTIONS
Schedule lab only visit 4-6 weeks after starting the lamisil.     Expect to treat the toenails 6-12 months (occasionally longer) to get resolution of the infection.

## 2022-04-12 NOTE — PROGRESS NOTES
Kumar is a 71 year old who is being evaluated via a billable telephone visit.      What phone number would you like to be contacted at? [phone]  How would you like to obtain your AVS? Mail a copy    Assessment & Plan     Onychomycosis  Recurrent. Reviewed options for tx including topicals. He would like to proceed with oral lamisil again and understands this medicine can impact liver and will need monitoring. Anticipate tx for 6-12 months. Will plan for liver function test's ever 3 months and 4-6 weeks after start.   - Comprehensive metabolic panel (BMP + Alb, Alk Phos, ALT, AST, Total. Bili, TP); Future  - terbinafine (LAMISIL) 250 MG tablet; Take 1 tablet (250 mg) by mouth in the morning.    Screening for hyperlipidemia  - Lipid panel reflex to direct LDL Fasting; Future    Screen for colon cancer  He will get colonoscopy at [place] this summer    Stage 3a chronic kidney disease (H)  Kidney, liver and electrolyte panel planned. Not yet on ACE given low nl bp and no proteinuria.     Hyperlipidemia LDL goal <130  On statin  - Comprehensive metabolic panel (BMP + Alb, Alk Phos, ALT, AST, Total. Bili, TP); Future  - atorvastatin (LIPITOR) 40 MG tablet; TAKE 1 TABLET BY MOUTH ONE TIME DAILY    Screening for prostate cancer  - PSA, screen; Future              Patient Instructions   Schedule lab only visit 4-6 weeks after starting the lamisil.     Expect to treat the toenails 6-12 months (occasionally longer) to get resolution of the infection.         Return in about 1 year (around 4/12/2023) for med check.    Suzanne Torres MD  [facility]   Kumar is a 71 year old who presents for the following health issues     HPI     Hx of of onychomycosis since 2014. Was treated by derm with lamisil 250 mg Q day for extended period of time.     Has been noting similar appearance now with the nails. Right foot (same as last) Lg toe and 3rd/4th toe also    Scheduled for executive  physical at [place] in July. Will get colonoscopy at that time.         Objective           Vitals:  No vitals were obtained today due to virtual visit.    Physical Exam   healthy, alert and no distress  PSYCH: Alert and oriented times 3; coherent speech, normal   rate and volume, able to articulate logical thoughts, able   to abstract reason, no tangential thoughts, no hallucinations   or delusions  His affect is normal and pleasant  RESP: No cough, no audible wheezing, able to talk in full sentences  Remainder of exam unable to be completed due to telephone visits                Phone call duration: 17 minutes

## 2022-05-24 ENCOUNTER — LAB (OUTPATIENT)
Dept: LAB | Facility: CLINIC | Age: 72
End: 2022-05-24
Payer: COMMERCIAL

## 2022-05-24 DIAGNOSIS — Z13.220 SCREENING FOR HYPERLIPIDEMIA: ICD-10-CM

## 2022-05-24 DIAGNOSIS — Z12.5 SCREENING FOR PROSTATE CANCER: ICD-10-CM

## 2022-05-24 DIAGNOSIS — N18.31 STAGE 3A CHRONIC KIDNEY DISEASE (H): ICD-10-CM

## 2022-05-24 DIAGNOSIS — B35.1 ONYCHOMYCOSIS: ICD-10-CM

## 2022-05-24 DIAGNOSIS — E78.5 HYPERLIPIDEMIA LDL GOAL <130: ICD-10-CM

## 2022-05-24 LAB
ALBUMIN SERPL-MCNC: 4.2 G/DL (ref 3.4–5)
ALP SERPL-CCNC: 71 U/L (ref 40–150)
ALT SERPL W P-5'-P-CCNC: 30 U/L (ref 0–70)
ANION GAP SERPL CALCULATED.3IONS-SCNC: 3 MMOL/L (ref 3–14)
AST SERPL W P-5'-P-CCNC: 15 U/L (ref 0–45)
BILIRUB DIRECT SERPL-MCNC: 0.2 MG/DL (ref 0–0.2)
BILIRUB SERPL-MCNC: 0.6 MG/DL (ref 0.2–1.3)
BUN SERPL-MCNC: 24 MG/DL (ref 7–30)
CALCIUM SERPL-MCNC: 9.4 MG/DL (ref 8.5–10.1)
CHLORIDE BLD-SCNC: 110 MMOL/L (ref 94–109)
CHOLEST SERPL-MCNC: 145 MG/DL
CO2 SERPL-SCNC: 30 MMOL/L (ref 20–32)
CREAT SERPL-MCNC: 1.12 MG/DL (ref 0.66–1.25)
CREAT UR-MCNC: 212 MG/DL
FASTING STATUS PATIENT QL REPORTED: YES
GFR SERPL CREATININE-BSD FRML MDRD: 71 ML/MIN/1.73M2
GLUCOSE BLD-MCNC: 108 MG/DL (ref 70–99)
HDLC SERPL-MCNC: 50 MG/DL
LDLC SERPL CALC-MCNC: 80 MG/DL
MICROALBUMIN UR-MCNC: 10 MG/L
MICROALBUMIN/CREAT UR: 4.72 MG/G CR (ref 0–17)
NONHDLC SERPL-MCNC: 95 MG/DL
POTASSIUM BLD-SCNC: 4.9 MMOL/L (ref 3.4–5.3)
PROT SERPL-MCNC: 7.5 G/DL (ref 6.8–8.8)
PSA SERPL-MCNC: 2.53 UG/L (ref 0–4)
SODIUM SERPL-SCNC: 143 MMOL/L (ref 133–144)
TRIGL SERPL-MCNC: 77 MG/DL

## 2022-05-24 PROCEDURE — 82248 BILIRUBIN DIRECT: CPT

## 2022-05-24 PROCEDURE — 80061 LIPID PANEL: CPT

## 2022-05-24 PROCEDURE — 82043 UR ALBUMIN QUANTITATIVE: CPT

## 2022-05-24 PROCEDURE — 80053 COMPREHEN METABOLIC PANEL: CPT

## 2022-05-24 PROCEDURE — G0103 PSA SCREENING: HCPCS

## 2022-05-24 PROCEDURE — 36415 COLL VENOUS BLD VENIPUNCTURE: CPT

## 2022-07-01 ENCOUNTER — LAB (OUTPATIENT)
Dept: LAB | Facility: CLINIC | Age: 72
End: 2022-07-01
Payer: COMMERCIAL

## 2022-07-01 DIAGNOSIS — B35.1 ONYCHOMYCOSIS: ICD-10-CM

## 2022-07-01 LAB
ALBUMIN SERPL-MCNC: 4.1 G/DL (ref 3.4–5)
ALP SERPL-CCNC: 70 U/L (ref 40–150)
ALT SERPL W P-5'-P-CCNC: 25 U/L (ref 0–70)
AST SERPL W P-5'-P-CCNC: 18 U/L (ref 0–45)
BILIRUB DIRECT SERPL-MCNC: 0.2 MG/DL (ref 0–0.2)
BILIRUB SERPL-MCNC: 0.8 MG/DL (ref 0.2–1.3)
PROT SERPL-MCNC: 7.1 G/DL (ref 6.8–8.8)

## 2022-07-01 PROCEDURE — 80076 HEPATIC FUNCTION PANEL: CPT

## 2022-07-01 PROCEDURE — 36415 COLL VENOUS BLD VENIPUNCTURE: CPT

## 2022-07-01 NOTE — RESULT ENCOUNTER NOTE
Kumar,  Thanks for getting the labs done.  Your liver panel remains normal.  Please MyChart or call if you have any concerns or questions.   Sincerely,  Suzanne Torres MD

## 2022-12-19 ENCOUNTER — TRANSFERRED RECORDS (OUTPATIENT)
Dept: MULTI SPECIALTY CLINIC | Facility: CLINIC | Age: 72
End: 2022-12-19

## 2022-12-19 LAB
ALT SERPL-CCNC: 19 U/L (ref 7–55)
CHOLESTEROL (EXTERNAL): 158 MG/DL
CREATININE (EXTERNAL): 1.25 MG/DL (ref 0.74–1.35)
GFR ESTIMATED (EXTERNAL): 62 ML/MIN/BSA
GLUCOSE (EXTERNAL): 89 MG/DL (ref 70–100)
HDLC SERPL-MCNC: 46 MG/DL
LDL CHOLESTEROL CALCULATED (EXTERNAL): 101 MG/DL
NON HDL CHOLESTEROL (EXTERNAL): 112 MG/DL
POTASSIUM (EXTERNAL): 4.2 MMOL/L (ref 3.6–5.2)
TRIGLYCERIDES (EXTERNAL): 55 MG/DL
TSH SERPL-ACNC: 1.4 MIU/L (ref 0.3–4.2)

## 2023-06-16 DIAGNOSIS — E78.5 HYPERLIPIDEMIA LDL GOAL <130: ICD-10-CM

## 2023-06-16 RX ORDER — ATORVASTATIN CALCIUM 40 MG/1
TABLET, FILM COATED ORAL
Qty: 90 TABLET | Refills: 3 | OUTPATIENT
Start: 2023-06-16

## 2023-06-16 NOTE — TELEPHONE ENCOUNTER
Looks like patient established with other clinic.   Have not seen > 1 year.   Will decline refill  If schedules appt can send geoff fill

## 2023-07-08 DIAGNOSIS — E78.5 HYPERLIPIDEMIA LDL GOAL <130: ICD-10-CM

## 2023-07-10 RX ORDER — ATORVASTATIN CALCIUM 40 MG/1
TABLET, FILM COATED ORAL
Qty: 90 TABLET | Refills: 3 | OUTPATIENT
Start: 2023-07-10

## 2023-07-13 ENCOUNTER — TELEPHONE (OUTPATIENT)
Dept: FAMILY MEDICINE | Facility: CLINIC | Age: 73
End: 2023-07-13
Payer: COMMERCIAL

## 2023-07-13 DIAGNOSIS — E78.5 HYPERLIPIDEMIA LDL GOAL <130: ICD-10-CM

## 2023-07-13 NOTE — TELEPHONE ENCOUNTER
Pt came to ask about getting prescription for toe fungus and get appt schedule for next week or 2. His atorvastatin medication is almost gone. Please advise if okay to use same day.

## 2023-07-14 RX ORDER — ATORVASTATIN CALCIUM 40 MG/1
TABLET, FILM COATED ORAL
Qty: 90 TABLET | Refills: 0 | Status: SHIPPED | OUTPATIENT
Start: 2023-07-14 | End: 2023-08-01

## 2023-07-14 NOTE — TELEPHONE ENCOUNTER
Called Pt regarding note below Pt is on his way to work Pt would like a phone call back at 3:00 to set up Apt's requested by PCP.       Bhavya ANGULO Visit Facilitator

## 2023-07-14 NOTE — TELEPHONE ENCOUNTER
Unable to start toenail fungus treatment without updated labs and visit (has been greater than 1 year since he was last seen)      He could be scheduled as a virtual visit for timely appointment but unable to use same-day slot for his concerns as these are nonurgent.      I can send in a Isabel refill of his atorvastatin to get him to next appointment

## 2023-07-17 DIAGNOSIS — E78.5 HYPERLIPIDEMIA LDL GOAL <130: ICD-10-CM

## 2023-07-17 RX ORDER — ATORVASTATIN CALCIUM 40 MG/1
TABLET, FILM COATED ORAL
Qty: 90 TABLET | Refills: 0 | OUTPATIENT
Start: 2023-07-17

## 2023-07-17 NOTE — TELEPHONE ENCOUNTER
Pending Prescriptions:                       Disp   Refills    atorvastatin (LIPITOR) 40 MG tablet       90 tab*0            Sig: TAKE 1 TABLET BY MOUTH ONE TIME DAILY

## 2023-07-17 NOTE — TELEPHONE ENCOUNTER
Medication Question or Refill    Contacts       Type Contact Phone/Fax    07/17/2023 12:13 PM CDT Phone (Incoming) Kumar Gonzalez (Self) [phone] (M)          What medication are you calling about (include dose and sig)?: atorvastatin (LIPITOR) 40 MG tablet    Preferred Pharmacy:   [facility]/pharmacy #[id number] - [facility] [address] [place] [address], [address], [facility] [address]  Phone: [phone] Fax: [phone]      Controlled Substance Agreement on file:   CSA -- Patient Level:    CSA: None found at the patient level.       Who prescribed the medication?: KATY HATCH    Do you need a refill? Yes (geoff refill)    When did you use the medication last? first week in October last year    Patient offered an appointment? Yes: appointment scheduled for 08/01/23    Do you have any questions or concerns?  No      Could we send this information to you in [facility] or would you prefer to receive a phone call?:   Patient would prefer a phone call   Okay to leave a detailed message?: Yes at Cell number on file:    Telephone Information:   Mobile [phone]

## 2023-07-21 ENCOUNTER — TRANSCRIBE ORDERS (OUTPATIENT)
Dept: OTHER | Age: 73
End: 2023-07-21

## 2023-07-21 DIAGNOSIS — M75.121 COMPLETE TEAR OF RIGHT ROTATOR CUFF, UNSPECIFIED WHETHER TRAUMATIC: Primary | ICD-10-CM

## 2023-07-31 ENCOUNTER — THERAPY VISIT (OUTPATIENT)
Dept: PHYSICAL THERAPY | Facility: CLINIC | Age: 73
End: 2023-07-31
Attending: ORTHOPAEDIC SURGERY
Payer: COMMERCIAL

## 2023-07-31 DIAGNOSIS — M75.121 COMPLETE TEAR OF RIGHT ROTATOR CUFF, UNSPECIFIED WHETHER TRAUMATIC: Primary | ICD-10-CM

## 2023-07-31 PROCEDURE — 97161 PT EVAL LOW COMPLEX 20 MIN: CPT | Mod: GP | Performed by: PHYSICAL THERAPIST

## 2023-07-31 PROCEDURE — 97110 THERAPEUTIC EXERCISES: CPT | Mod: GP | Performed by: PHYSICAL THERAPIST

## 2023-07-31 NOTE — PROGRESS NOTES
PHYSICAL THERAPY EVALUATION  Type of Visit: Evaluation    See electronic medical record for Abuse and Falls Screening details.    Subjective       Presenting condition or subjective complaint: shoulder painPt reports onset of R shoulder pain without known cause in February 2023. Went to [facility] and xray and was sent to PT.  Got pretty good movement but it continued to bother, MRI showed RCT. Saw Dr. Gipson at [place] for second opinion and had cortisone injection and was referred to PT.   Date of onset: 02/01/23    Relevant medical history:     Dates & types of surgery: eye removal 12/22, appendix removed 7/63    Prior diagnostic imaging/testing results: MRI; X-ray RCT   Prior therapy history for the same diagnosis, illness or injury: Yes [facility]    Prior Level of Function  Transfers:   Ambulation:   ADL:   IADL:     Living Environment  Social support: With a significant other or spouse   Type of home:     Stairs to enter the home:         Ramp:     Stairs inside the home:         Help at home: None  Equipment owned:       Employment: Yes real estate  Hobbies/Interests: boating, golfing swimming    Patient goals for therapy: move arm above my head    Pain assessment: See objective evaluation for additional pain details     Objective   SHOULDER EVALUATION  PAIN: Pain Level with Use: 2/10  Pain Location: anterior R shoulder  Pain Quality: Sharp  Pain Frequency: intermittent  Pain is Worst: activity related  Pain is Exacerbated By: overhead movements, carrying heavy objects  Pain is Relieved By: cortisone injection  Pain Progression: Improved  INTEGUMENTARY (edema, incisions):   POSTURE: Sitting Posture: Forward head  GAIT:   Weightbearing Status:   Assistive Device(s):   Gait Deviations:   BALANCE/PROPRIOCEPTION:   WEIGHTBEARING ALIGNMENT:   ROM:   (Degrees) Left AROM Left PROM Right AROM  Right PROM   Shoulder Flexion   WNL    Shoulder Extension       Shoulder Abduction   WNL    Shoulder Adduction        Shoulder Internal Rotation       Shoulder External Rotation       Shoulder Horizontal Abduction       Shoulder Horizontal Adduction       Shoulder Flexion ER   WNL    Shoulder Flexion IR   WNL    Elbow Extension       Elbow Flexion       Pain:   End feel:     STRENGTH:   Pain: - none + mild ++ moderate +++ severe  Strength Scale: 0-5/5 Left Right   Shoulder Flexion  5   Shoulder Extension     Shoulder Abduction  4+   Shoulder Adduction     Shoulder Internal Rotation  5   Shoulder External Rotation  4+   Shoulder Horizontal Abduction     Shoulder Horizontal Adduction     Elbow Flexion  5   Elbow Extension  5   Mid Trap     Lower Trap     Rhomboid     Serratus Anterior       FLEXIBILITY:   SPECIAL TESTS:  RCT drop arm test negative, patient able to easily resist during MMT flexion and abduction  PALPATION:   JOINT MOBILITY:   CERVICAL SCREEN:     Assessment & Plan   CLINICAL IMPRESSIONS  Medical Diagnosis: Complete tear of right rotator cuff    Treatment Diagnosis: R RCT   Impression/Assessment: Patient is a 72 year old male with R shoulder complaints.  The following significant findings have been identified: Pain, Decreased strength, and Inflammation. These impairments interfere with their ability to perform recreational activities and household chores as compared to previous level of function.     Clinical Decision Making (Complexity):  Clinical Presentation: Evolving/Changing  Clinical Presentation Rationale: based on medical and personal factors listed in PT evaluation  Clinical Decision Making (Complexity): Low complexity    PLAN OF CARE  Treatment Interventions:  Interventions: Manual Therapy, Neuromuscular Re-education, Therapeutic Activity, Therapeutic Exercise    Long Term Goals            Frequency of Treatment: 1x/wk  Duration of Treatment: 12 wks    Recommended Referrals to Other Professionals:   Education Assessment:        Risks and benefits of evaluation/treatment have been explained.    Patient/Family/caregiver agrees with Plan of Care.     Evaluation Time:     PT Eval, Low Complexity Minutes (69618): 20       Signing Clinician: RONEY [facility]                                                                                   OUTPATIENT PHYSICAL THERAPY      PLAN OF TREATMENT FOR OUTPATIENT REHABILITATION   Patient's Last Name, First Name, Kumar Person YOB: 1950   Provider's Name   [facility]   Medical Record No.  [id number]     Onset Date: 02/01/23  Start of Care Date: 07/31/23     Medical Diagnosis:  Complete tear of right rotator cuff      PT Treatment Diagnosis:  R RCT Plan of Treatment  Frequency/Duration: 1x/wk/ 12 wks    Certification date from 07/31/23 to 10/28/23         See note for plan of treatment details and functional goals     Dinesh Del Real, PT                         I CERTIFY THE NEED FOR THESE SERVICES FURNISHED UNDER        THIS PLAN OF TREATMENT AND WHILE UNDER MY CARE .             Physician Signature               Date    X_____________________________________________________                    Referring Provider:  Beltran Gipson      Initial Assessment  See Epic Evaluation- Start of Care Date: 07/31/23

## 2023-08-01 ENCOUNTER — OFFICE VISIT (OUTPATIENT)
Dept: FAMILY MEDICINE | Facility: OTHER | Age: 73
End: 2023-08-01
Payer: COMMERCIAL

## 2023-08-01 VITALS
TEMPERATURE: 97.3 F | RESPIRATION RATE: 16 BRPM | BODY MASS INDEX: 26.53 KG/M2 | HEART RATE: 70 BPM | HEIGHT: 67 IN | WEIGHT: 169 LBS | DIASTOLIC BLOOD PRESSURE: 70 MMHG | SYSTOLIC BLOOD PRESSURE: 120 MMHG | OXYGEN SATURATION: 96 %

## 2023-08-01 DIAGNOSIS — N18.30 STAGE 3 CHRONIC KIDNEY DISEASE, UNSPECIFIED WHETHER STAGE 3A OR 3B CKD (H): Primary | ICD-10-CM

## 2023-08-01 DIAGNOSIS — B35.1 ONYCHOMYCOSIS: ICD-10-CM

## 2023-08-01 DIAGNOSIS — Z12.11 SCREEN FOR COLON CANCER: ICD-10-CM

## 2023-08-01 DIAGNOSIS — E78.5 HYPERLIPIDEMIA LDL GOAL <130: ICD-10-CM

## 2023-08-01 LAB
ALBUMIN SERPL BCG-MCNC: 4.5 G/DL (ref 3.5–5.2)
ALP SERPL-CCNC: 72 U/L (ref 40–129)
ALT SERPL W P-5'-P-CCNC: 19 U/L (ref 0–70)
AST SERPL W P-5'-P-CCNC: 24 U/L (ref 0–45)
BILIRUB DIRECT SERPL-MCNC: <0.2 MG/DL (ref 0–0.3)
BILIRUB SERPL-MCNC: 0.8 MG/DL
CREAT UR-MCNC: 179.2 MG/DL
MICROALBUMIN UR-MCNC: <12 MG/L
MICROALBUMIN/CREAT UR: NORMAL MG/G{CREAT}
PROT SERPL-MCNC: 7.2 G/DL (ref 6.4–8.3)

## 2023-08-01 PROCEDURE — 82570 ASSAY OF URINE CREATININE: CPT | Performed by: PHYSICIAN ASSISTANT

## 2023-08-01 PROCEDURE — 36415 COLL VENOUS BLD VENIPUNCTURE: CPT | Performed by: PHYSICIAN ASSISTANT

## 2023-08-01 PROCEDURE — 82043 UR ALBUMIN QUANTITATIVE: CPT | Performed by: PHYSICIAN ASSISTANT

## 2023-08-01 PROCEDURE — 80076 HEPATIC FUNCTION PANEL: CPT | Performed by: PHYSICIAN ASSISTANT

## 2023-08-01 PROCEDURE — 99214 OFFICE O/P EST MOD 30 MIN: CPT | Performed by: PHYSICIAN ASSISTANT

## 2023-08-01 RX ORDER — TERBINAFINE HYDROCHLORIDE 250 MG/1
250 TABLET ORAL DAILY
Qty: 90 TABLET | Refills: 0 | Status: SHIPPED | OUTPATIENT
Start: 2023-08-01 | End: 2023-10-30

## 2023-08-01 RX ORDER — VALACYCLOVIR HYDROCHLORIDE 1 G/1
TABLET, FILM COATED ORAL
COMMUNITY
Start: 2023-07-24

## 2023-08-01 RX ORDER — TIMOLOL MALEATE 5 MG/ML
SOLUTION/ DROPS OPHTHALMIC
COMMUNITY
Start: 2022-01-01

## 2023-08-01 RX ORDER — ATORVASTATIN CALCIUM 40 MG/1
TABLET, FILM COATED ORAL
Qty: 90 TABLET | Refills: 3 | Status: SHIPPED | OUTPATIENT
Start: 2023-08-01 | End: 2024-08-12

## 2023-08-01 ASSESSMENT — PAIN SCALES - GENERAL: PAINLEVEL: NO PAIN (0)

## 2023-08-01 NOTE — PROGRESS NOTES
Assessment & Plan     Stage 3 chronic kidney disease, unspecified whether stage 3a or 3b CKD (H)  Labs updated today.  He has had not had any concerns. Urine test was obtained, no concerns. His recent creatinine in December was normal and GFR was > 60.   - Albumin Random Urine Quantitative with Creat Ratio; Future  - Albumin Random Urine Quantitative with Creat Ratio    Hyperlipidemia LDL goal <130  Stable on medication, lipids were done through [facility].   - atorvastatin (LIPITOR) 40 MG tablet; TAKE 1 TABLET BY MOUTH ONE TIME DAILY    Screen for colon cancer  Up to date. He had one done in 2017 and he notes they told him he didn't need to do for 10 years.  He had a hyperplastic polyp found.     Onychomycosis  See signs of recurring infection.   Will treat with Terbinafine again and have Hepatic lab done every month.    - Hepatic panel (Albumin, ALT, AST, Bili, Alk Phos, TP); Standing  - terbinafine (LAMISIL) 250 MG tablet; Take 1 tablet (250 mg) by mouth daily for 90 days  - Hepatic panel (Albumin, ALT, AST, Bili, Alk Phos, TP)    Due for wellness visit as of September, he was ok with RN reaching out to review.  Discussed why it is important. And he can continue to do the Executive exams through [facility] yearly.     Options for treatment and follow-up care were reviewed with the patient and/or guardian. Patient and/or guardian engaged in the decision making process and verbalized understanding of the options discussed and agreed with the final plan.      GALLITO [facility] BRUCE Heath is a 72 year old, presenting for the following health issues:  Recheck Medication        8/1/2023     3:23 PM   Additional Questions   Roomed by pedro   Accompanied by alone         8/1/2023     3:23 PM   Patient Reported Additional Medications   Patient reports taking the following new medications vitamin d3 2000 iu       History of Present Illness       Reason for visit:  Toe fungus    He  eats 0-1 servings of fruits and vegetables daily.He consumes 0 sweetened beverage(s) daily.He exercises with enough effort to increase his heart rate 9 or less minutes per day.  He exercises with enough effort to increase his heart rate 3 or less days per week.   He is taking medications regularly.       Hyperlipidemia Follow-Up    Are you regularly taking any medication or supplement to lower your cholesterol?   Yes- atovastain  Are you having muscle aches or other side effects that you think could be caused by your cholesterol lowering medication?  No    Onycomycosis:  - He was on Lamisil but was told to stop when he was traveling to [facility] due to the medications he had to take to travel. He notes that his issue was nearly resolved but when he stopped it has started to come back.  No side effects on the medications.     Tremor:  - Propranolol 20 mg 1-2 tablets daily.     Insomnia:  - Trazodone 50 mg 1-3 tablets nightly.   - Groggy in the AM, stopped.     Valtrex:  - Taking daily since the eye issue told to stop. Still taking daily.   - has hx of Cold sores.    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       Both iris sewn back together.   Corneal repairs   This fall had bleeding from cornea. 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    Start of glaucoma - using drops. Seeing ophthalmology every 3 month.   He was in [place] this last year when the eye issue arose and he also had a sore on his right lower leg that was bleeding but that spontaneously resolved. While he was at [place] he had significant evaluation for other concerns but notes that those issues have all since resolved.   He has the executive health evaluation through [facility] [facility] and is thinking he will continue to do that every year.       Review of Systems   Constitutional, HEENT, cardiovascular, pulmonary, GI, , musculoskeletal, neuro, skin, endocrine and psych systems are negative, except as otherwise noted.      Objective    /70   Pulse 70   Temp 97.3  F (36.3  C)  "(Temporal)   Resp 16   Ht 1.69 m (5' 6.54\")   Wt 76.7 kg (169 lb)   SpO2 96%   BMI 26.84 kg/m    Body mass index is 26.84 kg/m .  Physical Exam   GENERAL: healthy, alert and no distress  NECK: no adenopathy, no asymmetry, masses, or scars and thyroid normal to palpation  RESP: lungs clear to auscultation - no rales, rhonchi or wheezes  CV: regular rate and rhythm, normal S1 S2, no S3 or S4, no murmur, click or rub, no peripheral edema and peripheral pulses strong  ABDOMEN: soft, nontender, no hepatosplenomegaly, no masses and bowel sounds normal  MS: no gross musculoskeletal defects noted, no edema  SKIN: no suspicious lesions or rashes.  Left great toenail has yellowing with mild hypertrophy of the distal 1/3 of the nail.    NEURO: Normal strength and tone, mentation intact and speech normal  PSYCH: mentation appears normal, affect normal/bright    Results for orders placed or performed in visit on 08/01/23   Albumin Random Urine Quantitative with Creat Ratio     Status: None   Result Value Ref Range    Creatinine Urine mg/dL 179.2 mg/dL    Albumin Urine mg/L <12.0 mg/L    Albumin Urine mg/g Cr     Hepatic panel (Albumin, ALT, AST, Bili, Alk Phos, TP)     Status: Normal   Result Value Ref Range    Protein Total 7.2 6.4 - 8.3 g/dL    Albumin 4.5 3.5 - 5.2 g/dL    Bilirubin Total 0.8 <=1.2 mg/dL    Alkaline Phosphatase 72 40 - 129 U/L    AST 24 0 - 45 U/L    ALT 19 0 - 70 U/L    Bilirubin Direct <0.20 0.00 - 0.30 mg/dL                     "

## 2023-08-01 NOTE — Clinical Note
CareEverywhere: Colonoscopy and Surg Path Hyperplastic polyp 5/11/2017, repeat 10 years.  CBC, Basic, Lipids

## 2023-08-07 ENCOUNTER — THERAPY VISIT (OUTPATIENT)
Dept: PHYSICAL THERAPY | Facility: CLINIC | Age: 73
End: 2023-08-07
Payer: COMMERCIAL

## 2023-08-07 DIAGNOSIS — M75.121 COMPLETE TEAR OF RIGHT ROTATOR CUFF, UNSPECIFIED WHETHER TRAUMATIC: Primary | ICD-10-CM

## 2023-08-07 PROCEDURE — 97112 NEUROMUSCULAR REEDUCATION: CPT | Mod: 59 | Performed by: PHYSICAL THERAPIST

## 2023-08-07 PROCEDURE — 97530 THERAPEUTIC ACTIVITIES: CPT | Mod: GP | Performed by: PHYSICAL THERAPIST

## 2023-08-07 PROCEDURE — 97110 THERAPEUTIC EXERCISES: CPT | Mod: 59 | Performed by: PHYSICAL THERAPIST

## 2023-08-14 ENCOUNTER — THERAPY VISIT (OUTPATIENT)
Dept: PHYSICAL THERAPY | Facility: CLINIC | Age: 73
End: 2023-08-14
Payer: COMMERCIAL

## 2023-08-14 DIAGNOSIS — M75.121 COMPLETE TEAR OF RIGHT ROTATOR CUFF, UNSPECIFIED WHETHER TRAUMATIC: Primary | ICD-10-CM

## 2023-08-14 PROCEDURE — 97112 NEUROMUSCULAR REEDUCATION: CPT | Mod: GP | Performed by: PHYSICAL THERAPIST

## 2023-08-14 PROCEDURE — 97110 THERAPEUTIC EXERCISES: CPT | Mod: GP | Performed by: PHYSICAL THERAPIST

## 2023-08-30 ENCOUNTER — THERAPY VISIT (OUTPATIENT)
Dept: PHYSICAL THERAPY | Facility: CLINIC | Age: 73
End: 2023-08-30
Payer: COMMERCIAL

## 2023-08-30 DIAGNOSIS — M75.121 COMPLETE TEAR OF RIGHT ROTATOR CUFF, UNSPECIFIED WHETHER TRAUMATIC: Primary | ICD-10-CM

## 2023-08-30 PROCEDURE — 97110 THERAPEUTIC EXERCISES: CPT | Mod: GP | Performed by: PHYSICAL THERAPIST

## 2023-08-30 PROCEDURE — 97112 NEUROMUSCULAR REEDUCATION: CPT | Mod: GP | Performed by: PHYSICAL THERAPIST

## 2023-09-11 ENCOUNTER — TELEPHONE (OUTPATIENT)
Dept: FAMILY MEDICINE | Facility: OTHER | Age: 73
End: 2023-09-11

## 2023-09-11 ENCOUNTER — THERAPY VISIT (OUTPATIENT)
Dept: PHYSICAL THERAPY | Facility: CLINIC | Age: 73
End: 2023-09-11
Payer: COMMERCIAL

## 2023-09-11 DIAGNOSIS — M75.121 COMPLETE TEAR OF RIGHT ROTATOR CUFF, UNSPECIFIED WHETHER TRAUMATIC: Primary | ICD-10-CM

## 2023-09-11 PROCEDURE — 97110 THERAPEUTIC EXERCISES: CPT | Mod: GP | Performed by: PHYSICAL THERAPIST

## 2023-09-11 PROCEDURE — 97112 NEUROMUSCULAR REEDUCATION: CPT | Mod: GP | Performed by: PHYSICAL THERAPIST

## 2023-09-13 ENCOUNTER — LAB (OUTPATIENT)
Dept: LAB | Facility: CLINIC | Age: 73
End: 2023-09-13
Payer: COMMERCIAL

## 2023-09-13 DIAGNOSIS — B35.1 ONYCHOMYCOSIS: ICD-10-CM

## 2023-09-13 DIAGNOSIS — N18.30 CKD (CHRONIC KIDNEY DISEASE) STAGE 3, GFR 30-59 ML/MIN (H): Primary | ICD-10-CM

## 2023-09-13 LAB
ALBUMIN SERPL BCG-MCNC: 4.3 G/DL (ref 3.5–5.2)
ALP SERPL-CCNC: 69 U/L (ref 40–129)
ALT SERPL W P-5'-P-CCNC: 18 U/L (ref 0–70)
ANION GAP SERPL CALCULATED.3IONS-SCNC: 8 MMOL/L (ref 7–15)
AST SERPL W P-5'-P-CCNC: 23 U/L (ref 0–45)
BILIRUB DIRECT SERPL-MCNC: <0.2 MG/DL (ref 0–0.3)
BILIRUB SERPL-MCNC: 0.6 MG/DL
BUN SERPL-MCNC: 20.8 MG/DL (ref 8–23)
CALCIUM SERPL-MCNC: 9.3 MG/DL (ref 8.8–10.2)
CHLORIDE SERPL-SCNC: 105 MMOL/L (ref 98–107)
CREAT SERPL-MCNC: 1.14 MG/DL (ref 0.67–1.17)
DEPRECATED HCO3 PLAS-SCNC: 28 MMOL/L (ref 22–29)
EGFRCR SERPLBLD CKD-EPI 2021: 68 ML/MIN/1.73M2
GLUCOSE SERPL-MCNC: 90 MG/DL (ref 70–99)
HGB BLD-MCNC: 14.7 G/DL (ref 13.3–17.7)
POTASSIUM SERPL-SCNC: 4.3 MMOL/L (ref 3.4–5.3)
PROT SERPL-MCNC: 6.8 G/DL (ref 6.4–8.3)
SODIUM SERPL-SCNC: 141 MMOL/L (ref 136–145)

## 2023-09-13 PROCEDURE — 36415 COLL VENOUS BLD VENIPUNCTURE: CPT

## 2023-09-13 PROCEDURE — 85018 HEMOGLOBIN: CPT

## 2023-09-13 PROCEDURE — 80053 COMPREHEN METABOLIC PANEL: CPT

## 2023-09-13 PROCEDURE — 82248 BILIRUBIN DIRECT: CPT

## 2023-09-13 NOTE — TELEPHONE ENCOUNTER
Left message for patient to return call for scheduling annual wellness visit.   Left number to call: [phone], option 2, and ask to speak with triage nurse.     Patient can be scheduled in person for AWV on RN schedule on a day Erin Maguire PA-C is in clinic.     Argentina QUINONESN, RN   [facility]

## 2023-09-14 NOTE — TELEPHONE ENCOUNTER
Sent my chart message to patient.     Argentina QUINONESN, RN  [facility]

## 2023-09-25 ENCOUNTER — THERAPY VISIT (OUTPATIENT)
Dept: PHYSICAL THERAPY | Facility: CLINIC | Age: 73
End: 2023-09-25
Payer: COMMERCIAL

## 2023-09-25 DIAGNOSIS — M75.121 COMPLETE TEAR OF RIGHT ROTATOR CUFF, UNSPECIFIED WHETHER TRAUMATIC: Primary | ICD-10-CM

## 2023-09-25 PROCEDURE — 97110 THERAPEUTIC EXERCISES: CPT | Mod: GP | Performed by: PHYSICAL THERAPIST

## 2023-09-25 PROCEDURE — 97112 NEUROMUSCULAR REEDUCATION: CPT | Mod: GP | Performed by: PHYSICAL THERAPIST

## 2023-09-25 NOTE — PROGRESS NOTES
"   09/25/23 0500   Appointment Info   Signing clinician's name / credentials Kasey Lindquist DPT, SCS   Total/Authorized Visits 12   Visits Used 6   Medical Diagnosis Complete tear of right rotator cuff   PT Tx Diagnosis R RCT   Quick Adds Certification   Progress Note/Certification   Start of Care Date 07/31/23   Onset of illness/injury or Date of Surgery 02/01/23   Therapy Frequency 1x/wk   Predicted Duration 12 wks   Certification date from 07/31/23   Certification date to 10/28/23   Progress Note Due Date 09/26/23  (MD appt)   Progress Note Completed Date 09/25/23   PT Goal 1   Goal Identifier reaching   Goal Description improve strength and endurance to return to reaching overhead into [place] cabinet to put away dishes without pain and return to pickleball   Rationale to maximize safety and independence with performance of ADLs and functional tasks;to maximize safety and independence within the home;to maximize safety and independence within the community   Goal Progress can lift a stack of dishes 5# into cabinet pain free   Target Date 10/28/23   Date Met 09/25/23   Subjective Report   Subjective Report Doing well. He is doing his HEP every other day and is doing 2 min with 2# on all of his exercises. HIs R shoulder did get a little sore when he was doing bent over row on his opposite side.   Objective Measures   Objective Measures Objective Measure 1   Objective Measure 1   Objective Measure R shoulder AROM:   Details 165/165/T9/70   Treatment Interventions (PT)   Interventions Therapeutic Procedure/Exercise;Neuromuscular Re-education;Therapeutic Activity   Therapeutic Procedure/Exercise   Therapeutic Procedures: strength, endurance, ROM, flexibillity minutes (11319) 25   Ther Proc 1 push-up hold x 30\" with punch on counter   PTRx Ther Proc 1 Shoulder External Rotation Sidelying   PTRx Ther Proc 1 - Details x20   PTRx Ther Proc 2 Shoulder Flexion   PTRx Ther Proc 2 - Details x15   PTRx Ther Proc 3 Biceps " Hammer Curl - Alternating   PTRx Ther Proc 3 - Details 5# x 15   PTRx Ther Proc 4 Bent Over Rows   PTRx Ther Proc 4 - Details 10# x 10   PTRx Ther Proc 5 Bent Over Rows   PTRx Ther Proc 5 - Details rev   Patient Response/Progress inez well, no increase in pain   Neuromuscular Re-education   Neuromuscular re-ed of mvmt, balance, coord, kinesthetic sense, posture, proprioception minutes (36841) 13   Neuro Re-ed 1 supine pendulum with 1# and ball on wall proprio CW/CCW pain free   Neuro Re-ed 1 - Details 2' ea   PTRx Neuro Re-ed 1 Shoulder Scaption Full Can   PTRx Neuro Re-ed 1 - Details 12 oz x 15 NMR for scap dep and back on wall for no    PTRx Neuro Re-ed 2 Shoulder Horizontal Abduction Standing   PTRx Neuro Re-ed 2 - Details x15 NMR for scap dep and no UT hiking   PTRx Neuro Re-ed 3 Push-Up Plus At Counter   PTRx Neuro Re-ed 3 - Details x15 NMR for scap dep/ret   Patient Response/Progress inez well, struggles to pull shoulder blade back like he is supposed to   PTRx Neuro Re-ed 4 Proprioception At Counter Weight Shift   PTRx Neuro Re-ed 4 - Details x 20 with SA punch   PTRx Neuro Re-ed 5 Scapular Stabilization/Proprioception With A Ball   PTRx Neuro Re-ed 5 - Details 20 CW/CCW   PTRx Neuro Re-ed 6 Shoulder Wall Dribble   PTRx Neuro Re-ed 6 - Details No Notes   PTRx Neuro Re-ed 7 Shoulder Proprioception Milk Jug Abduction/Adduction   PTRx Neuro Re-ed 7 - Details No Notes   Plan   Home program see PTRx   Updates to plan of care progressing as tolerated with weight   Plan for next session dc to HEP         DISCHARGE  Reason for Discharge: Patient has met all goals.    Equipment Issued: n/a    Discharge Plan: Patient to continue home program.    Referring Provider:  Beltran Gipson

## 2023-11-26 ENCOUNTER — HEALTH MAINTENANCE LETTER (OUTPATIENT)
Age: 73
End: 2023-11-26

## 2024-08-10 DIAGNOSIS — E78.5 HYPERLIPIDEMIA LDL GOAL <130: ICD-10-CM

## 2024-08-12 RX ORDER — ATORVASTATIN CALCIUM 40 MG/1
TABLET, FILM COATED ORAL
Qty: 90 TABLET | Refills: 0 | Status: SHIPPED | OUTPATIENT
Start: 2024-08-12

## 2024-11-08 DIAGNOSIS — E78.5 HYPERLIPIDEMIA LDL GOAL <130: ICD-10-CM

## 2024-11-08 RX ORDER — ATORVASTATIN CALCIUM 40 MG/1
TABLET, FILM COATED ORAL
Qty: 90 TABLET | Refills: 0 | OUTPATIENT
Start: 2024-11-08

## 2024-11-23 DIAGNOSIS — E78.5 HYPERLIPIDEMIA LDL GOAL <130: ICD-10-CM

## 2024-11-25 RX ORDER — ATORVASTATIN CALCIUM 40 MG/1
TABLET, FILM COATED ORAL
Qty: 90 TABLET | Refills: 0 | OUTPATIENT
Start: 2024-11-25

## 2025-01-04 ENCOUNTER — HEALTH MAINTENANCE LETTER (OUTPATIENT)
Age: 75
End: 2025-01-04

## 2025-01-13 ENCOUNTER — TRANSFERRED RECORDS (OUTPATIENT)
Dept: HEALTH INFORMATION MANAGEMENT | Facility: CLINIC | Age: 75
End: 2025-01-13
Payer: COMMERCIAL

## 2025-04-09 DIAGNOSIS — E78.5 HYPERLIPIDEMIA LDL GOAL <130: ICD-10-CM

## 2025-04-09 RX ORDER — ATORVASTATIN CALCIUM 40 MG/1
40 TABLET, FILM COATED ORAL
Qty: 90 TABLET | Refills: 0 | OUTPATIENT
Start: 2025-04-09

## 2025-04-11 DIAGNOSIS — E78.5 HYPERLIPIDEMIA LDL GOAL <130: ICD-10-CM

## 2025-04-14 RX ORDER — ATORVASTATIN CALCIUM 40 MG/1
40 TABLET, FILM COATED ORAL
Qty: 90 TABLET | Refills: 0 | OUTPATIENT
Start: 2025-04-14

## 2025-04-16 NOTE — TELEPHONE ENCOUNTER
#2 Called pt mailbox was full, couldn't leave a message, #3 [facility] message was sent. Closing encounter